# Patient Record
Sex: FEMALE | Race: WHITE | Employment: OTHER | URBAN - METROPOLITAN AREA
[De-identification: names, ages, dates, MRNs, and addresses within clinical notes are randomized per-mention and may not be internally consistent; named-entity substitution may affect disease eponyms.]

---

## 2017-01-12 ENCOUNTER — OFFICE VISIT (OUTPATIENT)
Dept: ONCOLOGY | Age: 67
End: 2017-01-12

## 2017-01-12 VITALS
OXYGEN SATURATION: 96 % | RESPIRATION RATE: 20 BRPM | BODY MASS INDEX: 38.62 KG/M2 | SYSTOLIC BLOOD PRESSURE: 143 MMHG | WEIGHT: 226.2 LBS | DIASTOLIC BLOOD PRESSURE: 68 MMHG | HEIGHT: 64 IN | TEMPERATURE: 97.2 F | HEART RATE: 80 BPM

## 2017-01-12 DIAGNOSIS — D50.0 IRON DEFICIENCY ANEMIA DUE TO CHRONIC BLOOD LOSS: Primary | ICD-10-CM

## 2017-01-12 RX ORDER — METOPROLOL SUCCINATE 25 MG/1
TABLET, EXTENDED RELEASE ORAL
COMMUNITY
Start: 2016-11-06 | End: 2017-05-03 | Stop reason: SDUPTHER

## 2017-01-12 NOTE — PROGRESS NOTES
[id number] [facility] at [facility]  [phone]    Hematology / Oncology Followup    Reason for Visit:   Oc Chapin is a 61 y.o. female who is seen for follow up of iron deficiency anemia. History of Present Illness:   She received IV iron last summer. Fatigue improved initially, but has subsequently. Stress could be contributing, mother  a few months ago. No bleeding. No melena. PAST HISTORY: The following sections were reviewed and updated in the EMR as appropriate: PMH, SH, FH, Medications, Allergies. She is unaccompanied today. She works part time at an office. She lives in [place] with her , grown son, and her grandson is with them part-time. Allergies   Allergen Reactions    Loratadine Nausea Only and Other (comments)     Muscle weakness      Review of Systems: A complete review of systems was obtained, reviewed, and scanned into the EMR. Pertinent findings reviewed above. Physical Exam:     Visit Vitals    /68 (BP 1 Location: Left arm, BP Patient Position: Sitting)    Pulse 80    Temp 97.2 °F (36.2 °C) (Oral)    Resp 20    Ht 5' 4\" (1.626 m)    Wt 226 lb 3.2 oz (102.6 kg)    LMP 1996    SpO2 96%    BMI 38.83 kg/m2     General: No distress  Eyes: PERRLA, anicteric sclerae  HENT: Atraumatic, OP clear  Neck: Supple  Lymphatic: No cervical, supraclavicular, or inguinal adenopathy  Respiratory: CTAB, normal respiratory effort  CV: Normal rate, regular rhythm, no murmurs, no peripheral edema  GI: Soft, nontender, nondistended, no masses, no hepatomegaly, no splenomegaly  MS: Normal gait and station. Digits without clubbing or cyanosis. Skin: No rashes, ecchymoses, or petechiae. Normal temperature, turgor, and texture.   Psych: Alert, oriented, appropriate affect, normal judgment/insight    Results:     Lab Results   Component Value Date/Time    WBC 9.2 2017 11:58 AM    HGB 15.1 2017 11:58 AM    HCT 44.7 01/05/2017 11:58 AM    PLATELET 381 62/24/4768 11:58 AM    MCV 86 01/05/2017 11:58 AM    ABS. NEUTROPHILS 6.1 07/26/2016 12:36 PM    Hemoglobin (POC) 8.8 02/20/2014 11:34 AM    Hemoglobin (POC) 10.2 10/15/2011 03:12 PM    Hematocrit (POC) 30 10/15/2011 03:12 PM     Lab Results   Component Value Date/Time    Sodium 140 07/27/2016 01:03 AM    Potassium 3.8 07/27/2016 01:03 AM    Chloride 106 07/27/2016 01:03 AM    CO2 28 07/27/2016 01:03 AM    Glucose 116 07/27/2016 01:03 AM    BUN 7 07/27/2016 01:03 AM    Creatinine 0.80 07/27/2016 01:03 AM    GFR est AA >60 07/27/2016 01:03 AM    GFR est non-AA >60 07/27/2016 01:03 AM    Calcium 8.6 07/27/2016 01:03 AM    Sodium (POC) 141 10/15/2011 03:12 PM    Potassium (POC) 3.5 10/15/2011 03:12 PM    Chloride (POC) 104 10/15/2011 03:12 PM    Glucose (POC) 83 07/26/2016 12:03 PM    Glucose (POC) 95 10/15/2011 03:12 PM    Glucose POC 90 12/05/2016 11:08 AM    BUN (POC) 10 10/15/2011 03:12 PM    Creatinine (POC) 1.1 10/15/2011 03:12 PM    Calcium, ionized (POC) 1.11 10/15/2011 03:12 PM     Lab Results   Component Value Date/Time    Bilirubin, total 0.4 07/26/2016 12:36 PM    AST 29 07/26/2016 12:36 PM    Alk.  phosphatase 81 07/26/2016 12:36 PM    Protein, total 7.6 07/26/2016 12:36 PM    Albumin 3.9 07/26/2016 12:36 PM    Globulin 3.7 07/26/2016 12:36 PM    A-G Ratio 1.1 07/26/2016 12:36 PM       Lab Results   Component Value Date/Time    Reticulocyte count 1.9 02/20/2014 10:50 AM    Iron % saturation 21 01/05/2017 11:58 AM    Iron 78 01/05/2017 11:58 AM    TIBC 378 01/05/2017 11:58 AM    Ferritin 24 01/05/2017 11:58 AM    Vitamin B12 380 08/21/2012 09:45 AM    Folate 15.5 08/21/2012 09:45 AM    Haptoglobin 101 02/20/2014 10:50 AM     12/08/2011 02:38 PM    Sed rate (ESR) 5 05/05/2014 10:40 AM    TSH 2.59 07/26/2016 12:36 PM    TSH 1.960 03/17/2015 03:04 PM    INR 1.0 10/15/2011 03:07 PM    INR (POC) 1.0 07/26/2016 12:07 PM    Lipase 70 05/10/2012 12:30 PM     Ferritin   Date Value   01/05/2017 24 ng/mL   07/15/2016 18 ng/mL   05/03/2016 4 ng/mL (L)   12/07/2015 10 ng/mL (L)   06/03/2015 17 ng/mL   12/08/2014 79 ng/mL   06/11/2014 40 NG/ML   04/11/2014 19 NG/ML   02/20/2014 5 ng/mL (L)   10/07/2011 5 ng/mL (L)       Stool Blood 2/24/2014: negative  Celiac panel 12/8/2014: negative    EGD and Colonscopy with Dr. Anshul Stewart 4/9/2014: severe diverticulosis, nonbleeding  Capsule study 4/30/2014: normal    EGD and Colonoscopy with Dr. Tex Marte 6/16/2016: large cecum AVM, one polyp (tubular adenoma), otherwise normal.  Capsule endoscopy 6/30/2016: normal    Assessment:   1) Anemia, Iron Deficiency  Recurrent. HGB now normalized s/p feraheme (5/2016 and 7/2016). Iron levels remain low-normal, but overall stable. The patient has not been able to tolerate PO iron replacement. The source of her iron deficiency remains uncertain. Based on repeat GI evaluation, it may have been the large AVM in her cecum, which has now been treated. Celiac panel was negative, and no reason to suggest another malabsorption issue. No evidence of bleeding from elsewhere. Monitor. 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 If iron deficiency continues to recur, may need repeat endoscopy at some point. 3) Fatigue  Recurrent. Not clear that this is related to her iron deficiency anemia, as it persists now despite a HGB of 15. Follow up with PCP.     Plan:     Labs in 6 months: CBC, iron profile, ferritin ([facility])  Return to see me in about 6 months      Signed By: Alex Osorio MD     January 12, 2017

## 2017-01-12 NOTE — MR AVS SNAPSHOT
Visit Information Date & Time Provider Department Dept. Phone Encounter #  
 1/12/2017  9:30 AM Eduard Lagos MD [facility] at [address] [place] [address] Rd [id number] Follow-up Instructions Return in about 6 months (around 7/12/2017) for michael Azul deficiency fu. Follow-up and Disposition History Upcoming Health Maintenance Date Due Hepatitis C Screening 1950 Pneumococcal 19-64 Medium Risk (1 of 1 - PPSV23) 4/21/1971 DTaP/Tdap/Td series (1 - Tdap) 4/21/1973 PAP AKA CERVICAL CYTOLOGY 4/21/1973 ZOSTER VACCINE AGE 60> 4/21/2012 BREAST CANCER SCRN MAMMOGRAM 4/14/2016 INFLUENZA AGE 9 TO ADULT 8/1/2016 COLONOSCOPY 4/9/2024 Allergies as of 1/12/2017  Review Complete On: 1/12/2017 By: Eduard Lagos MD  
  
 Severity Noted Reaction Type Reactions Loratadine  12/08/2011    Nausea Only, Other (comments) Muscle weakness Current Immunizations  Reviewed on 7/29/2016 Name Date Hib (PRP-T) 6/30/2015 Influenza Vaccine 12/17/2015, 10/22/2015 Influenza Vaccine Split 10/26/2012, 10/7/2011 Not reviewed this visit You Were Diagnosed With   
  
 Codes Comments Iron deficiency anemia due to chronic blood loss    -  Primary ICD-10-CM: D50.0 ICD-9-CM: 280.0 Vitals BP Pulse Temp Resp Height(growth percentile) Weight(growth percentile) 143/68 (BP 1 Location: Left arm, BP Patient Position: Sitting) 80 97.2 °F (36.2 °C) (Oral) 20 5' 4\" (1.626 m) 226 lb 3.2 oz (102.6 kg) LMP SpO2 BMI OB Status Smoking Status 01/01/1996 96% 38.83 kg/m2 Hysterectomy Former Smoker BMI and BSA Data Body Mass Index Body Surface Area  
 38.83 kg/m 2 2.15 m 2 Preferred Pharmacy Pharmacy Name Phone [facility] [address] [place] [address], [address] [place] [address] [phone] Your Updated Medication List  
  
   
 This list is accurate as of: 1/12/17 10:10 AM.  Always use your most recent med list.  
  
  
  
  
 albuterol 90 mcg/actuation inhaler Commonly known as:  VENTOLIN HFA Take 2 Puffs by inhalation every six (6) hours as needed for Wheezing or Shortness of Breath (may also use for cough). ALPRAZolam 0.5 mg tablet Commonly known as:  Mosetta Harp Take 1 Tab by mouth two (2) times daily as needed for Anxiety. Max Daily Amount: 1 mg.  
  
 atorvastatin 10 mg tablet Commonly known as:  LIPITOR Take 1 Tab by mouth daily. hydrocortisone 2.5 % topical cream  
Commonly known as:  HYTONE Apply  to affected area two (2) times a day. use thin layer  
  
 lansoprazole 30 mg capsule Commonly known as:  PREVACID TAKE 1 CAPSULE DAILY BEFORE BREAKFAST  
  
 levocetirizine 5 mg tablet Commonly known as:  Dimple Delilah Take 1 Tab by mouth daily. losartan 25 mg tablet Commonly known as:  COZAAR Take 1 Tab by mouth daily. mometasone-formoterol 200-5 mcg/actuation HFA inhaler Commonly known as:  Monica Kristy Take 2 Puffs by inhalation two (2) times a day. montelukast 10 mg tablet Commonly known as:  SINGULAIR  
TAKE 1 TABLET DAILY PARoxetine 20 mg tablet Commonly known as:  PAXIL Take 1 Tab by mouth daily. potassium chloride 20 mEq tablet Commonly known as:  K-DUR, KLOR-CON Take 1 Tab by mouth three (3) times daily. sodium bicarb-sodium chloride Kit Commonly known as:  NEILMED SINUS RINSE COMPLETE  
1 Packet by Nasal route daily. TOPROL XL 25 mg XL tablet Generic drug:  metoprolol succinate  
  
 triamterene-hydroCHLOROthiazide 37.5-25 mg per tablet Commonly known as:  Ramonia Gelineau TAKE 1 TABLET DAILY We Performed the Following CBC W/O DIFF [94066 CPT(R)] FERRITIN [28800 CPT(R)] IRON PROFILE [id number] CPT(R)] Follow-up Instructions Return in about 6 months (around 7/12/2017) for Radjose eduardo, iron deficiency fu. Introducing [facility]! Karli Mc introduces [employer] patient portal. Now you can access parts of your medical record, email your doctor's office, and request medication refills online. 1. In your internet browser, go to [email or web address] 2. Click on the First Time User? Click Here link in the Sign In box. You will see the New Member Sign Up page. 3. Enter your [employer] Access Code exactly as it appears below. You will not need to use this code after youve completed the sign-up process. If you do not sign up before the expiration date, you must request a new code. · [employer] Access Code: [email or web address] Expires: 2/5/2017  2:42 PM 
 
4. Enter the last four digits of your Social Security Number (xxxx) and Date of Birth (mm/dd/yyyy) as indicated and click Submit. You will be taken to the next sign-up page. 5. Create a [employer] ID. This will be your [employer] login ID and cannot be changed, so think of one that is secure and easy to remember. 6. Create a [employer] password. You can change your password at any time. 7. Enter your Password Reset Question and Answer. This can be used at a later time if you forget your password. 8. Enter your e-mail address. You will receive e-mail notification when new information is available in [address] 9. Click Sign Up. You can now view and download portions of your medical record. 10. Click the Download Summary menu link to download a portable copy of your medical information. If you have questions, please visit the Frequently Asked Questions section of the [employer] website. Remember, [employer] is NOT to be used for urgent needs. For medical emergencies, dial 911. Now available from your iPhone and Android! Please provide this summary of care documentation to your next provider. Your primary care clinician is listed as Joni Swenson. If you have any questions after today's visit, please call [phone].

## 2017-01-17 RX ORDER — ATORVASTATIN CALCIUM 10 MG/1
TABLET, FILM COATED ORAL
Qty: 90 TAB | Refills: 0 | Status: SHIPPED | OUTPATIENT
Start: 2017-01-17 | End: 2017-03-13 | Stop reason: SDUPTHER

## 2017-02-12 DIAGNOSIS — I10 ESSENTIAL HYPERTENSION: ICD-10-CM

## 2017-02-12 RX ORDER — TRIAMTERENE/HYDROCHLOROTHIAZID 37.5-25 MG
TABLET ORAL
Qty: 90 TAB | Refills: 0 | Status: SHIPPED | OUTPATIENT
Start: 2017-02-12 | End: 2017-03-13 | Stop reason: SDUPTHER

## 2017-02-27 RX ORDER — POTASSIUM CHLORIDE 20 MEQ/1
TABLET, EXTENDED RELEASE ORAL
Qty: 90 TAB | Refills: 0 | Status: SHIPPED | OUTPATIENT
Start: 2017-02-27 | End: 2017-03-13 | Stop reason: SDUPTHER

## 2017-02-27 RX ORDER — LEVOCETIRIZINE DIHYDROCHLORIDE 5 MG/1
TABLET, FILM COATED ORAL
Qty: 90 TAB | Refills: 0 | Status: SHIPPED | OUTPATIENT
Start: 2017-02-27 | End: 2017-03-13 | Stop reason: SDUPTHER

## 2017-03-13 ENCOUNTER — OFFICE VISIT (OUTPATIENT)
Dept: FAMILY MEDICINE CLINIC | Age: 67
End: 2017-03-13

## 2017-03-13 VITALS
WEIGHT: 224 LBS | SYSTOLIC BLOOD PRESSURE: 115 MMHG | RESPIRATION RATE: 20 BRPM | HEART RATE: 78 BPM | DIASTOLIC BLOOD PRESSURE: 77 MMHG | TEMPERATURE: 98.2 F | OXYGEN SATURATION: 96 % | HEIGHT: 64 IN | BODY MASS INDEX: 38.24 KG/M2

## 2017-03-13 DIAGNOSIS — E66.9 OBESITY (BMI 30-39.9): ICD-10-CM

## 2017-03-13 DIAGNOSIS — J30.9 ALLERGIC RHINITIS, UNSPECIFIED ALLERGIC RHINITIS TRIGGER, UNSPECIFIED RHINITIS SEASONALITY: ICD-10-CM

## 2017-03-13 DIAGNOSIS — E78.00 HYPERCHOLESTEROLEMIA: ICD-10-CM

## 2017-03-13 DIAGNOSIS — I10 ESSENTIAL HYPERTENSION WITH GOAL BLOOD PRESSURE LESS THAN 130/80: Primary | ICD-10-CM

## 2017-03-13 DIAGNOSIS — F41.9 ANXIETY: ICD-10-CM

## 2017-03-13 DIAGNOSIS — K21.9 GASTROESOPHAGEAL REFLUX DISEASE WITHOUT ESOPHAGITIS: ICD-10-CM

## 2017-03-13 DIAGNOSIS — D50.0 IRON DEFICIENCY ANEMIA DUE TO CHRONIC BLOOD LOSS: ICD-10-CM

## 2017-03-13 RX ORDER — TRIAMTERENE/HYDROCHLOROTHIAZID 37.5-25 MG
1 TABLET ORAL DAILY
Qty: 90 TAB | Refills: 1 | Status: SHIPPED | OUTPATIENT
Start: 2017-03-13 | End: 2017-10-17 | Stop reason: SDUPTHER

## 2017-03-13 RX ORDER — LOSARTAN POTASSIUM 25 MG/1
25 TABLET ORAL DAILY
Qty: 30 TAB | Refills: 0 | Status: SHIPPED | OUTPATIENT
Start: 2017-03-13 | End: 2017-03-14 | Stop reason: SDUPTHER

## 2017-03-13 RX ORDER — LEVOCETIRIZINE DIHYDROCHLORIDE 5 MG/1
5 TABLET, FILM COATED ORAL DAILY
Qty: 90 TAB | Refills: 1 | Status: SHIPPED | OUTPATIENT
Start: 2017-03-13 | End: 2017-10-17 | Stop reason: SDUPTHER

## 2017-03-13 RX ORDER — PAROXETINE HYDROCHLORIDE 20 MG/1
20 TABLET, FILM COATED ORAL DAILY
Qty: 30 TAB | Refills: 0 | Status: SHIPPED | OUTPATIENT
Start: 2017-03-13 | End: 2017-04-17 | Stop reason: SDUPTHER

## 2017-03-13 RX ORDER — POTASSIUM CHLORIDE 20 MEQ/1
20 TABLET, EXTENDED RELEASE ORAL DAILY
Qty: 360 TAB | Refills: 1 | Status: SHIPPED | OUTPATIENT
Start: 2017-03-13 | End: 2017-03-27 | Stop reason: SDUPTHER

## 2017-03-13 RX ORDER — ATORVASTATIN CALCIUM 10 MG/1
10 TABLET, FILM COATED ORAL DAILY
Qty: 90 TAB | Refills: 1 | Status: SHIPPED | OUTPATIENT
Start: 2017-03-13 | End: 2017-09-21 | Stop reason: SDUPTHER

## 2017-03-13 NOTE — PROGRESS NOTES
Chief Complaint   Patient presents with    Medication Refill     1. Have you been to the ER, urgent care clinic since your last visit? Hospitalized since your last visit? No    2. Have you seen or consulted any other health care providers outside of the [facility] since your last visit? Include any pap smears or colon screening.  No

## 2017-03-13 NOTE — PATIENT INSTRUCTIONS
DASH Diet: Care Instructions  Your Care Instructions  The DASH diet is an eating plan that can help lower your blood pressure. DASH stands for Dietary Approaches to Stop Hypertension. Hypertension is high blood pressure. The DASH diet focuses on eating foods that are high in calcium, potassium, and magnesium. These nutrients can lower blood pressure. The foods that are highest in these nutrients are fruits, vegetables, low-fat dairy products, nuts, seeds, and legumes. But taking calcium, potassium, and magnesium supplements instead of eating foods that are high in those nutrients does not have the same effect. The DASH diet also includes whole grains, fish, and poultry. The DASH diet is one of several lifestyle changes your doctor may recommend to lower your high blood pressure. Your doctor may also want you to decrease the amount of sodium in your diet. Lowering sodium while following the DASH diet can lower blood pressure even further than just the DASH diet alone. Follow-up care is a key part of your treatment and safety. Be sure to make and go to all appointments, and call your doctor if you are having problems. It's also a good idea to know your test results and keep a list of the medicines you take. How can you care for yourself at home? Following the DASH diet  · Eat 4 to 5 servings of fruit each day. A serving is 1 medium-sized piece of fruit, ½ cup chopped or canned fruit, 1/4 cup dried fruit, or 4 ounces (½ cup) of fruit juice. Choose fruit more often than fruit juice. · Eat 4 to 5 servings of vegetables each day. A serving is 1 cup of lettuce or raw leafy vegetables, ½ cup of chopped or cooked vegetables, or 4 ounces (½ cup) of vegetable juice. Choose vegetables more often than vegetable juice. · Get 2 to 3 servings of low-fat and fat-free dairy each day. A serving is 8 ounces of milk, 1 cup of yogurt, or 1 ½ ounces of cheese. · Eat 6 to 8 servings of grains each day.  A serving is 1 slice of bread, 1 ounce of dry cereal, or ½ cup of cooked rice, pasta, or cooked cereal. Try to choose whole-grain products as much as possible. · Limit lean meat, poultry, and fish to 2 servings each day. A serving is 3 ounces, about the size of a deck of cards. · Eat 4 to 5 servings of nuts, seeds, and legumes (cooked dried beans, lentils, and split peas) each week. A serving is 1/3 cup of nuts, 2 tablespoons of seeds, or ½ cup of cooked beans or peas. · Limit fats and oils to 2 to 3 servings each day. A serving is 1 teaspoon of vegetable oil or 2 tablespoons of salad dressing. · Limit sweets and added sugars to 5 servings or less a week. A serving is 1 tablespoon jelly or jam, ½ cup sorbet, or 1 cup of lemonade. · Eat less than 2,300 milligrams (mg) of sodium a day. If you limit your sodium to 1,500 mg a day, you can lower your blood pressure even more. Tips for success  · Start small. Do not try to make dramatic changes to your diet all at once. You might feel that you are missing out on your favorite foods and then be more likely to not follow the plan. Make small changes, and stick with them. Once those changes become habit, add a few more changes. · Try some of the following:  ¨ Make it a goal to eat a fruit or vegetable at every meal and at snacks. This will make it easy to get the recommended amount of fruits and vegetables each day. ¨ Try yogurt topped with fruit and nuts for a snack or healthy dessert. ¨ Add lettuce, tomato, cucumber, and onion to sandwiches. ¨ Combine a ready-made pizza crust with low-fat mozzarella cheese and lots of vegetable toppings. Try using tomatoes, squash, spinach, broccoli, carrots, cauliflower, and onions. ¨ Have a variety of cut-up vegetables with a low-fat dip as an appetizer instead of chips and dip. ¨ Sprinkle sunflower seeds or chopped almonds over salads. Or try adding chopped walnuts or almonds to cooked vegetables. ¨ Try some vegetarian meals using beans and peas. Add garbanzo or kidney beans to salads. Make burritos and tacos with mashed morse beans or black beans. Where can you learn more? Go to http://negrita-zuleyka.info/. Enter Q216 in the search box to learn more about \"DASH Diet: Care Instructions. \"  Current as of: March 23, 2016  Content Version: 11.1  © 5977-2529 [employer]. Care instructions adapted under license by [employer] (which disclaims liability or warranty for this information). If you have questions about a medical condition or this instruction, always ask your healthcare professional. [place] [address] any warranty or liability for your use of this information. Gastroesophageal Reflux Disease (GERD): Care Instructions  Your Care Instructions    Gastroesophageal reflux disease (GERD) is the backward flow of stomach acid into the esophagus. The esophagus is the tube that leads from your throat to your stomach. A one-way valve prevents the stomach acid from moving up into this tube. When you have GERD, this valve does not close tightly enough. If you have mild GERD symptoms including heartburn, you may be able to control the problem with antacids or over-the-counter medicine. Changing your diet, losing weight, and making other lifestyle changes can also help reduce symptoms. Follow-up care is a key part of your treatment and safety. Be sure to make and go to all appointments, and call your doctor if you are having problems. Its also a good idea to know your test results and keep a list of the medicines you take. How can you care for yourself at home? · Take your medicines exactly as prescribed. Call your doctor if you think you are having a problem with your medicine. · Your doctor may recommend over-the-counter medicine. For mild or occasional indigestion, antacids, such as Tums, Gaviscon, Mylanta, or Maalox, may help.  Your doctor also may recommend over-the-counter acid reducers, such as Pepcid AC, Tagamet HB, Zantac 75, or Prilosec. Read and follow all instructions on the label. If you use these medicines often, talk with your doctor. · Change your eating habits. ¨ Its best to eat several small meals instead of two or three large meals. ¨ After you eat, wait 2 to 3 hours before you lie down. ¨ Chocolate, mint, and alcohol can make GERD worse. ¨ Spicy foods, foods that have a lot of acid (like tomatoes and oranges), and coffee can make GERD symptoms worse in some people. If your symptoms are worse after you eat a certain food, you may want to stop eating that food to see if your symptoms get better. · Do not smoke or chew tobacco. Smoking can make GERD worse. If you need help quitting, talk to your doctor about stop-smoking programs and medicines. These can increase your chances of quitting for good. · If you have GERD symptoms at night, raise the head of your bed 6 to 8 inches by putting the frame on blocks or placing a foam wedge under the head of your mattress. (Adding extra pillows does not work.)  · Do not wear tight clothing around your middle. · Lose weight if you need to. Losing just 5 to 10 pounds can help. When should you call for help? Call your doctor now or seek immediate medical care if:  · You have new or different belly pain. · Your stools are black and tarlike or have streaks of blood. Watch closely for changes in your health, and be sure to contact your doctor if:  · Your symptoms have not improved after 2 days. · Food seems to catch in your throat or chest.  Where can you learn more? Go to http://negrita-zuleyka.info/. Enter Y662 in the search box to learn more about \"Gastroesophageal Reflux Disease (GERD): Care Instructions. \"  Current as of: August 9, 2016  Content Version: 11.1  © 3884-0734 [employer].  Care instructions adapted under license by [employer] (which disclaims liability or warranty for this information). If you have questions about a medical condition or this instruction, always ask your healthcare professional. [place] [address] any warranty or liability for your use of this information.

## 2017-03-13 NOTE — MR AVS SNAPSHOT
Visit Information Date & Time Provider Department Dept. Phone Encounter #  
 3/13/2017 10:15 AM Liza Choi NP [facility] [id number] Follow-up Instructions Return in about 4 months (around 7/13/2017) for welcome to medicare visit, fasting blood work. Your Appointments 7/21/2017  9:30 AM  
ESTABLISHED PATIENT with Rajan Jiménez MD  
[facility]) Appt Note: [place], iron deficiency fu  
 [address], [address] [place] [address]  
[phone]  
  
   
 [address] [facility], [address] [facility] Upcoming Health Maintenance Date Due Hepatitis C Screening 1950 Pneumococcal 19-64 Medium Risk (1 of 1 - PPSV23) 4/21/1971 DTaP/Tdap/Td series (1 - Tdap) 4/21/1973 PAP AKA CERVICAL CYTOLOGY 4/21/1973 ZOSTER VACCINE AGE 60> 4/21/2012 BREAST CANCER SCRN MAMMOGRAM 4/14/2016 INFLUENZA AGE 9 TO ADULT 8/1/2016 COLONOSCOPY 4/9/2024 Allergies as of 3/13/2017  Review Complete On: 3/13/2017 By: Liza Choi NP Severity Noted Reaction Type Reactions Loratadine  12/08/2011    Nausea Only, Other (comments) Muscle weakness Current Immunizations  Reviewed on 7/29/2016 Name Date Hib (PRP-T) 6/30/2015 Influenza Vaccine 12/17/2015, 10/22/2015 Influenza Vaccine Split 10/26/2012, 10/7/2011 Not reviewed this visit You Were Diagnosed With   
  
 Codes Comments Essential hypertension with goal blood pressure less than 130/80    -  Primary ICD-10-CM: I10 
ICD-9-CM: 401.9 Gastroesophageal reflux disease without esophagitis     ICD-10-CM: K21.9 ICD-9-CM: 530.81 Anxiety     ICD-10-CM: F41.9 ICD-9-CM: 300.00 Hypercholesterolemia     ICD-10-CM: E78.00 ICD-9-CM: 272.0 Iron deficiency anemia due to chronic blood loss     ICD-10-CM: D50.0 ICD-9-CM: 280.0 Allergic rhinitis, unspecified allergic rhinitis trigger, unspecified rhinitis seasonality     ICD-10-CM: J30.9 ICD-9-CM: 477.9 Vitals BP Pulse Temp Resp Height(growth percentile) Weight(growth percentile) 115/77 (BP 1 Location: Left arm, BP Patient Position: Sitting) 78 98.2 °F (36.8 °C) (Oral) 20 5' 4\" (1.626 m) 224 lb (101.6 kg) LMP SpO2 BMI OB Status Smoking Status 01/01/1996 96% 38.45 kg/m2 Hysterectomy Former Smoker Vitals History BMI and BSA Data Body Mass Index Body Surface Area  
 38.45 kg/m 2 2.14 m 2 Preferred Pharmacy Pharmacy Name Phone [facility] [address] [place] [address], [address] [place] [address] [phone] Your Updated Medication List  
  
   
This list is accurate as of: 3/13/17 11:06 AM.  Always use your most recent med list.  
  
  
  
  
 albuterol 90 mcg/actuation inhaler Commonly known as:  VENTOLIN HFA Take 2 Puffs by inhalation every six (6) hours as needed for Wheezing or Shortness of Breath (may also use for cough). ALPRAZolam 0.5 mg tablet Commonly known as:  Volanda Pronto Take 1 Tab by mouth two (2) times daily as needed for Anxiety. Max Daily Amount: 1 mg.  
  
 atorvastatin 10 mg tablet Commonly known as:  LIPITOR Take 1 Tab by mouth daily. hydrocortisone 2.5 % topical cream  
Commonly known as:  HYTONE Apply  to affected area two (2) times a day. use thin layer  
  
 lansoprazole 30 mg capsule Commonly known as:  PREVACID TAKE 1 CAPSULE DAILY BEFORE BREAKFAST  
  
 levocetirizine 5 mg tablet Commonly known as:  Carly Boyers Take 1 Tab by mouth daily. losartan 25 mg tablet Commonly known as:  COZAAR Take 1 Tab by mouth daily. mometasone-formoterol 200-5 mcg/actuation HFA inhaler Commonly known as:  Marivel Batter Take 2 Puffs by inhalation two (2) times a day. montelukast 10 mg tablet Commonly known as:  SINGULAIR  
TAKE 1 TABLET DAILY PARoxetine 20 mg tablet Commonly known as:  PAXIL Take 1 Tab by mouth daily. potassium chloride 20 mEq tablet Commonly known as:  K-DUR, KLOR-CON Take 1 Tab by mouth daily. sodium bicarb-sodium chloride Kit Commonly known as:  NEILMED SINUS RINSE COMPLETE  
1 Packet by Nasal route daily. TOPROL XL 25 mg XL tablet Generic drug:  metoprolol succinate  
  
 triamterene-hydroCHLOROthiazide 37.5-25 mg per tablet Commonly known as:  Evlia Divine Take 1 Tab by mouth daily. Prescriptions Sent to Pharmacy Refills  
 losartan (COZAAR) 25 mg tablet 0 Sig: Take 1 Tab by mouth daily. Class: Normal  
 Pharmacy: [facility] [address] [place], [place] [address]y [address] [place] [address] E Ph #: [phone] Route: Oral  
 atorvastatin (LIPITOR) 10 mg tablet 1 Sig: Take 1 Tab by mouth daily. Class: Normal  
 Pharmacy: [address], [address] Ph #: [phone] Route: Oral  
 triamterene-hydroCHLOROthiazide (MAXZIDE) 37.5-25 mg per tablet 1 Sig: Take 1 Tab by mouth daily. Class: Normal  
 Pharmacy: [address], [address] Ph #: [phone] Route: Oral  
 levocetirizine (XYZAL) 5 mg tablet 1 Sig: Take 1 Tab by mouth daily. Class: Normal  
 Pharmacy: [address], [address] Ph #: [phone] Route: Oral  
 potassium chloride (K-DUR, KLOR-CON) 20 mEq tablet 1 Sig: Take 1 Tab by mouth daily. Class: Normal  
 Pharmacy: [address], [address] Ph #: [phone] Route: Oral  
 PARoxetine (PAXIL) 20 mg tablet 0 Sig: Take 1 Tab by mouth daily. Class: Normal  
 Pharmacy: [facility] [address] [place], [place] [address]y [address] [place] [address] E Ph #: [phone] Route: Oral  
  
Follow-up Instructions Return in about 4 months (around 7/13/2017) for welcome to medicare visit, fasting blood work. Patient Instructions DASH Diet: Care Instructions Your Care Instructions The DASH diet is an eating plan that can help lower your blood pressure. DASH stands for Dietary Approaches to Stop Hypertension. Hypertension is high blood pressure. The DASH diet focuses on eating foods that are high in calcium, potassium, and magnesium. These nutrients can lower blood pressure. The foods that are highest in these nutrients are fruits, vegetables, low-fat dairy products, nuts, seeds, and legumes. But taking calcium, potassium, and magnesium supplements instead of eating foods that are high in those nutrients does not have the same effect. The DASH diet also includes whole grains, fish, and poultry. The DASH diet is one of several lifestyle changes your doctor may recommend to lower your high blood pressure. Your doctor may also want you to decrease the amount of sodium in your diet. Lowering sodium while following the DASH diet can lower blood pressure even further than just the DASH diet alone. Follow-up care is a key part of your treatment and safety. Be sure to make and go to all appointments, and call your doctor if you are having problems. It's also a good idea to know your test results and keep a list of the medicines you take. How can you care for yourself at home? Following the DASH diet · Eat 4 to 5 servings of fruit each day. A serving is 1 medium-sized piece of fruit, ½ cup chopped or canned fruit, 1/4 cup dried fruit, or 4 ounces (½ cup) of fruit juice. Choose fruit more often than fruit juice. · Eat 4 to 5 servings of vegetables each day. A serving is 1 cup of lettuce or raw leafy vegetables, ½ cup of chopped or cooked vegetables, or 4 ounces (½ cup) of vegetable juice. Choose vegetables more often than vegetable juice. · Get 2 to 3 servings of low-fat and fat-free dairy each day.  A serving is 8 ounces of milk, 1 cup of yogurt, or 1 ½ ounces of cheese. · Eat 6 to 8 servings of grains each day. A serving is 1 slice of bread, 1 ounce of dry cereal, or ½ cup of cooked rice, pasta, or cooked cereal. Try to choose whole-grain products as much as possible. · Limit lean meat, poultry, and fish to 2 servings each day. A serving is 3 ounces, about the size of a deck of cards. · Eat 4 to 5 servings of nuts, seeds, and legumes (cooked dried beans, lentils, and split peas) each week. A serving is 1/3 cup of nuts, 2 tablespoons of seeds, or ½ cup of cooked beans or peas. · Limit fats and oils to 2 to 3 servings each day. A serving is 1 teaspoon of vegetable oil or 2 tablespoons of salad dressing. · Limit sweets and added sugars to 5 servings or less a week. A serving is 1 tablespoon jelly or jam, ½ cup sorbet, or 1 cup of lemonade. · Eat less than 2,300 milligrams (mg) of sodium a day. If you limit your sodium to 1,500 mg a day, you can lower your blood pressure even more. Tips for success · Start small. Do not try to make dramatic changes to your diet all at once. You might feel that you are missing out on your favorite foods and then be more likely to not follow the plan. Make small changes, and stick with them. Once those changes become habit, add a few more changes. · Try some of the following: ¨ Make it a goal to eat a fruit or vegetable at every meal and at snacks. This will make it easy to get the recommended amount of fruits and vegetables each day. ¨ Try yogurt topped with fruit and nuts for a snack or healthy dessert. ¨ Add lettuce, tomato, cucumber, and onion to sandwiches. ¨ Combine a ready-made pizza crust with low-fat mozzarella cheese and lots of vegetable toppings. Try using tomatoes, squash, spinach, broccoli, carrots, cauliflower, and onions. ¨ Have a variety of cut-up vegetables with a low-fat dip as an appetizer instead of chips and dip. ¨ Sprinkle sunflower seeds or chopped almonds over salads. Or try adding chopped walnuts or almonds to cooked vegetables. ¨ Try some vegetarian meals using beans and peas. Add garbanzo or kidney beans to salads. Make burritos and tacos with mashed morse beans or black beans. Where can you learn more? Go to http://negrita-zuleyka.info/. Enter T299 in the search box to learn more about \"DASH Diet: Care Instructions. \" Current as of: March 23, 2016 Content Version: 11.1 © 4507-5492 [employer]Out. Care instructions adapted under license by [employer] (which disclaims liability or warranty for this information). If you have questions about a medical condition or this instruction, always ask your healthcare professional. [address] any warranty or liability for your use of this information. Gastroesophageal Reflux Disease (GERD): Care Instructions Your Care Instructions Gastroesophageal reflux disease (GERD) is the backward flow of stomach acid into the esophagus. The esophagus is the tube that leads from your throat to your stomach. A one-way valve prevents the stomach acid from moving up into this tube. When you have GERD, this valve does not close tightly enough. If you have mild GERD symptoms including heartburn, you may be able to control the problem with antacids or over-the-counter medicine. Changing your diet, losing weight, and making other lifestyle changes can also help reduce symptoms. Follow-up care is a key part of your treatment and safety. Be sure to make and go to all appointments, and call your doctor if you are having problems. Its also a good idea to know your test results and keep a list of the medicines you take. How can you care for yourself at home? · Take your medicines exactly as prescribed. Call your doctor if you think you are having a problem with your medicine. · Your doctor may recommend over-the-counter medicine. For mild or occasional indigestion, antacids, such as Tums, Gaviscon, Mylanta, or Maalox, may help. Your doctor also may recommend over-the-counter acid reducers, such as Pepcid AC, Tagamet HB, Zantac 75, or Prilosec. Read and follow all instructions on the label. If you use these medicines often, talk with your doctor. · Change your eating habits. ¨ Its best to eat several small meals instead of two or three large meals. ¨ After you eat, wait 2 to 3 hours before you lie down. ¨ Chocolate, mint, and alcohol can make GERD worse. ¨ Spicy foods, foods that have a lot of acid (like tomatoes and oranges), and coffee can make GERD symptoms worse in some people. If your symptoms are worse after you eat a certain food, you may want to stop eating that food to see if your symptoms get better. · Do not smoke or chew tobacco. Smoking can make GERD worse. If you need help quitting, talk to your doctor about stop-smoking programs and medicines. These can increase your chances of quitting for good. · If you have GERD symptoms at night, raise the head of your bed 6 to 8 inches by putting the frame on blocks or placing a foam wedge under the head of your mattress. (Adding extra pillows does not work.) · Do not wear tight clothing around your middle. · Lose weight if you need to. Losing just 5 to 10 pounds can help. When should you call for help? Call your doctor now or seek immediate medical care if: 
· You have new or different belly pain. · Your stools are black and tarlike or have streaks of blood. Watch closely for changes in your health, and be sure to contact your doctor if: 
· Your symptoms have not improved after 2 days. · Food seems to catch in your throat or chest. 
Where can you learn more? Go to http://negrita-zuleyka.info/.  
Enter J829 in the search box to learn more about \"Gastroesophageal Reflux Disease (GERD): Care Instructions. \" Current as of: August 9, 2016 Content Version: 11.1 © 6795-6204 [employer]. Care instructions adapted under license by [employer]montoring.com (which disclaims liability or warranty for this information). If you have questions about a medical condition or this instruction, always ask your healthcare professional. [address] any warranty or liability for your use of this information. Introducing [facility]! [facility] introduces FlyReadyJet patient portal. Now you can access parts of your medical record, email your doctor's office, and request medication refills online. 1. In your internet browser, go to [email or web address] 2. Click on the First Time User? Click Here link in the Sign In box. You will see the New Member Sign Up page. 3. Enter your FlyReadyJet Access Code exactly as it appears below. You will not need to use this code after youve completed the sign-up process. If you do not sign up before the expiration date, you must request a new code. · FlyReadyJet Access Code: [email or web address] Expires: 6/11/2017 10:57 AM 
 
4. Enter the last four digits of your Social Security Number (xxxx) and Date of Birth (mm/dd/yyyy) as indicated and click Submit. You will be taken to the next sign-up page. 5. Create a FlyReadyJet ID. This will be your FlyReadyJet login ID and cannot be changed, so think of one that is secure and easy to remember. 6. Create a FlyReadyJet password. You can change your password at any time. 7. Enter your Password Reset Question and Answer. This can be used at a later time if you forget your password. 8. Enter your e-mail address. You will receive e-mail notification when new information is available in [address] 9. Click Sign Up. You can now view and download portions of your medical record. 10. Click the Download Summary menu link to download a portable copy of your medical information. If you have questions, please visit the Frequently Asked Questions section of the [employer] website. Remember, [employer]Works is NOT to be used for urgent needs. For medical emergencies, dial 911. Now available from your iPhone and Android! Please provide this summary of care documentation to your next provider. Your primary care clinician is listed as Tigre Martinez. If you have any questions after today's visit, please call [phone].

## 2017-03-14 RX ORDER — LOSARTAN POTASSIUM 25 MG/1
25 TABLET ORAL DAILY
Qty: 90 TAB | Refills: 1 | Status: SHIPPED | OUTPATIENT
Start: 2017-03-14 | End: 2017-08-25 | Stop reason: SDUPTHER

## 2017-03-15 NOTE — PROGRESS NOTES
Subjective:     Julien Galloway is a 61 y.o. female who presents for follow up of hypertension, hyperlipidemia, anxiety, GERD, and obesity. Diet and Lifestyle: generally follows a low fat low cholesterol diet, generally follows a low sodium diet, exercises sporadically, nonsmoker  Home BP Monitoring: is not measured at home    Cardiovascular ROS: taking medications as instructed, no medication side effects noted, no TIA's, no chest pain on exertion, no dyspnea on exertion, no swelling of ankles, no orthostatic dizziness or lightheadedness. New concerns: reports  has expressed concern about long-term use of PPI after seeing commercials on TV indicating there may be some associated adverse effects. Each time she has tried to discontinue or even reduce her dose, she has recurrence of significant symptoms. She has been compliant with recommended lifestyle changes including elevating HOB, eating smaller meals, not eating late in the evening, not wearing tight clothing, avoidance of triggers. Doing well on paxil, wants to continue on current dose. Patient Active Problem List   Diagnosis Code    GERD (gastroesophageal reflux disease) K21.9    Obesity (BMI 30-39. 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 9)     Vertigo     Vestibulopathy 7/27/2016     Past Surgical History:   Procedure Laterality Date    HX COLONOSCOPY  10/25/2011 1 polyp which was removed, diverticulosis in distal descending colon & sigmoid colon.  HX ENDOSCOPY  04/2014    HX GYN  1996    hysterectomy    HX OTHER SURGICAL  2006? L Lower Abd. Basal Cell CA     Family History   Problem Relation Age of Onset    Hypertension Mother     Stroke Maternal Grandmother     Cancer Paternal Grandmother      breast cancer     Social History   Substance Use Topics    Smoking status: Former Smoker     Packs/day: 1.00     Years: 18.00     Types: Cigarettes     Quit date: 7/24/1988    Smokeless tobacco: Never Used      Comment: Quit in 1988    Alcohol use Yes      Comment: rarely        Lab Results  Component Value Date/Time   WBC 9.2 01/05/2017 11:58 AM   WBC 7.7 05/11/2012 03:22 PM   Hemoglobin (POC) 8.8 02/20/2014 11:34 AM   Hemoglobin (POC) 10.2 10/15/2011 03:12 PM   HGB 15.1 01/05/2017 11:58 AM   Hematocrit (POC) 30 10/15/2011 03:12 PM   HCT 44.7 01/05/2017 11:58 AM   PLATELET 398 55/26/5166 11:58 AM   MCV 86 01/05/2017 11:58 AM       Lab Results  Component Value Date/Time   Cholesterol, total 180 07/26/2016 12:36 PM   HDL Cholesterol 32 07/26/2016 12:36 PM   LDL, calculated 101.2 07/26/2016 12:36 PM   Triglyceride 234 07/26/2016 12:36 PM   CHOL/HDL Ratio 5.6 07/26/2016 12:36 PM       Lab Results  Component Value Date/Time   ALT (SGPT) 37 07/26/2016 12:36 PM   AST (SGOT) 29 07/26/2016 12:36 PM   Alk.  phosphatase 81 07/26/2016 12:36 PM   Bilirubin, direct 0.1 10/15/2011 03:07 PM   Bilirubin, total 0.4 07/26/2016 12:36 PM       Lab Results  Component Value Date/Time   GFR est AA >60 07/27/2016 01:03 AM   GFR est non-AA >60 07/27/2016 01:03 AM   Creatinine (POC) 1.1 10/15/2011 03:12 PM   Creatinine 0.80 07/27/2016 01:03 AM   BUN 7 07/27/2016 01:03 AM   BUN (POC) 10 10/15/2011 03:12 PM   Sodium (POC) 141 10/15/2011 03:12 PM   Sodium 140 07/27/2016 01:03 AM   Potassium 3.8 07/27/2016 01:03 AM   Potassium (POC) 3.5 10/15/2011 03:12 PM   Chloride (POC) 104 10/15/2011 03:12 PM Chloride 106 07/27/2016 01:03 AM   CO2 28 07/27/2016 01:03 AM      Lab Results  Component Value Date/Time   TSH 2.59 07/26/2016 12:36 PM   T4, Free 0.98 04/09/2013 04:10 PM         Review of Systems, additional:  Pertinent items are noted in HPI. Objective:     Visit Vitals    /77 (BP 1 Location: Left arm, BP Patient Position: Sitting)    Pulse 78    Temp 98.2 °F (36.8 °C) (Oral)    Resp 20    Ht 5' 4\" (1.626 m)    Wt 224 lb (101.6 kg)    LMP 01/01/1996    SpO2 96%    BMI 38.45 kg/m2     Appearance: alert, well appearing, and in no distress, oriented to person, place, and time, overweight and well hydrated. General exam: CVS exam BP noted to be well controlled today in office, S1, S2 normal, no gallop, no murmur, chest clear, no JVD, no HSM, no edema, peripheral vascular exam both carotids normal upstroke without bruits, neurological exam alert, oriented, normal speech, no focal findings or movement disorder noted, motor and sensory grossly normal bilaterally. Assessment/Plan:     .  current treatment plan is effective, no change in therapy  lab results and schedule of future lab studies reviewed with patient  repeat labs ordered prior to next appointment  reviewed diet, exercise and weight control. Ni was seen today for medication refill. Diagnoses and all orders for this visit:    Essential hypertension with goal blood pressure less than 130/80  At goal  Continue current medications  DASH diet  Walking 20 minutes daily  Weight loss  -     losartan (COZAAR) 25 mg tablet; Take 1 Tab by mouth daily. -     triamterene-hydroCHLOROthiazide (MAXZIDE) 37.5-25 mg per tablet; Take 1 Tab by mouth daily.     Gastroesophageal reflux disease without esophagitis  Discussed risks/benefits of long term use of PPI  Recommend continuing lansoprazole 30 mg for now  Weight loss  Avoid peppermint, caffeine, alcohol  Avoid triggers     Anxiety  Good clinical response on paroxetine  Continue at current dose  -     PARoxetine (PAXIL) 20 mg tablet; Take 1 Tab by mouth daily. Hypercholesterolemia  TLC Diet:  -- Saturated fat <7% of calories, cholesterol <200 mg/day  -- Consider increased viscous (soluble) fiber (10-25 g/day) and plant stanols/sterols  (2g/day) as therapeutic options to enhance LDL lowering  Weight management  Increased physical activity. Continue lipitor  Check fasting lipids prior to next appt- orders entered    Iron deficiency anemia due to chronic blood loss  Possible source of GI blood loss found and treated at last colonoscopy  S/p iron infusions  Continue f/u with hematology- next appt scheduled with Dr. Sarah Oropeza for July (4 months)    Allergic rhinitis, unspecified allergic rhinitis trigger, unspecified rhinitis seasonality  Continue xyzal flonase    Obesity,  Body mass index is 38.45 kg/(m^2). I have reviewed/discussed the above normal BMI with the patient. I have recommended the following interventions: encourage exercise, lifestyle education regarding diet, strength training and weight loss from baseline weight . The plan is as follows: I have counseled this patient on diet and exercise regimens. Other orders  -     atorvastatin (LIPITOR) 10 mg tablet; Take 1 Tab by mouth daily. -     levocetirizine (XYZAL) 5 mg tablet; Take 1 Tab by mouth daily. -     potassium chloride (K-DUR, KLOR-CON) 20 mEq tablet; Take 1 Tab by mouth daily. Follow-up Disposition:  Return in about 4 months (around 7/13/2017) for welcome to medicare visit, fasting blood work. I have discussed the diagnosis with the patient and the intended plan as seen in the above orders. The patient has received an after-visit summary and questions were answered concerning future plans. Patient conveyed understanding of the plan at the time of the visit.     Kallie Murillo NP

## 2017-03-27 ENCOUNTER — OFFICE VISIT (OUTPATIENT)
Dept: FAMILY MEDICINE CLINIC | Age: 67
End: 2017-03-27

## 2017-03-27 VITALS
DIASTOLIC BLOOD PRESSURE: 77 MMHG | TEMPERATURE: 98.4 F | WEIGHT: 220 LBS | HEART RATE: 82 BPM | SYSTOLIC BLOOD PRESSURE: 110 MMHG | BODY MASS INDEX: 37.56 KG/M2 | RESPIRATION RATE: 20 BRPM | HEIGHT: 64 IN | OXYGEN SATURATION: 95 %

## 2017-03-27 DIAGNOSIS — J11.1 INFLUENZA-LIKE ILLNESS: Primary | ICD-10-CM

## 2017-03-27 DIAGNOSIS — J44.1 COPD WITH ACUTE EXACERBATION (HCC): ICD-10-CM

## 2017-03-27 DIAGNOSIS — R68.89 FLU-LIKE SYMPTOMS: ICD-10-CM

## 2017-03-27 DIAGNOSIS — J02.9 SORE THROAT: ICD-10-CM

## 2017-03-27 LAB
QUICKVUE INFLUENZA TEST: NEGATIVE
S PYO AG THROAT QL: NEGATIVE
VALID INTERNAL CONTROL?: YES
VALID INTERNAL CONTROL?: YES

## 2017-03-27 RX ORDER — PREDNISONE 20 MG/1
40 TABLET ORAL DAILY
Qty: 8 TAB | Refills: 0 | Status: SHIPPED | OUTPATIENT
Start: 2017-03-27 | End: 2017-03-31

## 2017-03-27 RX ORDER — BENZONATATE 100 MG/1
100 CAPSULE ORAL
Qty: 30 CAP | Refills: 1 | Status: SHIPPED | OUTPATIENT
Start: 2017-03-27 | End: 2017-03-27 | Stop reason: SDUPTHER

## 2017-03-27 RX ORDER — DEXTROMETHORPHAN POLISTIREX 30 MG/5ML
60 SUSPENSION ORAL 2 TIMES DAILY
Qty: 200 ML | Refills: 0 | Status: SHIPPED | OUTPATIENT
Start: 2017-03-27 | End: 2017-04-06

## 2017-03-27 RX ORDER — DEXTROMETHORPHAN POLISTIREX 30 MG/5ML
60 SUSPENSION ORAL 2 TIMES DAILY
Qty: 200 ML | Refills: 0 | Status: SHIPPED | OUTPATIENT
Start: 2017-03-27 | End: 2017-03-27 | Stop reason: SDUPTHER

## 2017-03-27 RX ORDER — BENZONATATE 100 MG/1
100 CAPSULE ORAL
Qty: 30 CAP | Refills: 1 | Status: SHIPPED | OUTPATIENT
Start: 2017-03-27 | End: 2017-04-03

## 2017-03-27 RX ORDER — PSEUDOEPHEDRINE HCL 30 MG
30 TABLET ORAL
Qty: 15 TAB | Refills: 0 | Status: SHIPPED | OUTPATIENT
Start: 2017-03-27 | End: 2017-03-30

## 2017-03-27 RX ORDER — PSEUDOEPHEDRINE HCL 30 MG
30 TABLET ORAL
Qty: 15 TAB | Refills: 0 | Status: SHIPPED | OUTPATIENT
Start: 2017-03-27 | End: 2017-03-27 | Stop reason: SDUPTHER

## 2017-03-27 RX ORDER — IBUPROFEN 800 MG/1
800 TABLET ORAL
Qty: 30 TAB | Refills: 0 | Status: SHIPPED | OUTPATIENT
Start: 2017-03-27 | End: 2017-03-27 | Stop reason: SDUPTHER

## 2017-03-27 RX ORDER — POTASSIUM CHLORIDE 20 MEQ/1
20 TABLET, EXTENDED RELEASE ORAL 3 TIMES DAILY
Qty: 270 TAB | Refills: 1 | Status: SHIPPED | OUTPATIENT
Start: 2017-03-27 | End: 2017-09-05 | Stop reason: SDUPTHER

## 2017-03-27 RX ORDER — IBUPROFEN 800 MG/1
800 TABLET ORAL
Qty: 30 TAB | Refills: 0 | Status: SHIPPED | OUTPATIENT
Start: 2017-03-27 | End: 2018-04-08

## 2017-03-27 RX ORDER — PREDNISONE 20 MG/1
40 TABLET ORAL DAILY
Qty: 8 TAB | Refills: 0 | Status: SHIPPED | OUTPATIENT
Start: 2017-03-27 | End: 2017-03-27 | Stop reason: SDUPTHER

## 2017-03-27 NOTE — PROGRESS NOTES
Subjective:   Marni Souza is a 61 y.o. female who present complaining of flu-like symptoms: fevers, chills, myalgias, fatigue, congestion, sore throat, clear rhinorrhea and nonproductive cough for 3 days. Tmax 100, has been taking OTC ibuprofen for relief. Has been so fatigued has not been out of bed much in the past 2 days  She has dyspnea with exertion, denies wheezing. Has not tried using her albuterol inhaler. Good compliance with dulera. Smoking status: non-smoker. Flu vaccine status: vaccinated currently. Relevant PMH: COPD. Review of Systems  A comprehensive review of systems was negative except for that written in the HPI. Patient Active Problem List   Diagnosis Code    GERD (gastroesophageal reflux disease) K21.9    Obesity (BMI 30-39. 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  HX ENDOSCOPY  04/2014    HX GYN  1996    hysterectomy    HX OTHER SURGICAL  2006? L Lower Abd.  Basal Cell CA     Family History   Problem Relation Age of Onset    Hypertension Mother     Stroke Maternal Grandmother     Cancer Paternal Grandmother      breast cancer     Social History   Substance Use Topics    Smoking status: Former Smoker     Packs/day: 1.00     Years: 18.00     Types: Cigarettes     Quit date: 7/24/1988    Smokeless tobacco: Never Used      Comment: Quit in 1988    Alcohol use Yes      Comment: rarely       Objective:     Visit Vitals    /77 (BP 1 Location: Left arm, BP Patient Position: Sitting)    Pulse 82    Temp 98.4 °F (36.9 °C) (Oral)    Resp 20    Ht 5' 4\" (1.626 m)    Wt 220 lb (99.8 kg)    LMP 01/01/1996    SpO2 95%    BMI 37.76 kg/m2       Appears moderately ill but not toxic; temperature as noted in vitals. Ears left normal, right mild erythema without dullness. Throat and pharynx moderate erythema. Neck supple. No adenopathy in the neck. Sinuses non tender. The chest is clear. CV RRR no m/r/g. Skin normal turgor and coloration. Neuro no gross deficits. Results for orders placed or performed in visit on 03/27/17   AMB POC RAPID INFLUENZA TEST   Result Value Ref Range    VALID INTERNAL CONTROL POC Yes     QuickVue Influenza test Negative Negative   AMB POC RAPID STREP A   Result Value Ref Range    VALID INTERNAL CONTROL POC Yes     Group A Strep Ag Negative Negative         Assessment/Plan:   Influenza very likely from clinical presentation and seasonal pattern  Considerations for specific influenza anti-viral therapy: symptoms present > 48 hours, antiviral therapy unlikely to be effective  Symptomatic therapy suggested: rest, increase fluids, OTC ibuprofen, Sudafed, Delsym, bland diet and call prn if symptoms persist or worsen. Call or return to clinic prn if these symptoms worsen or fail to improve as anticipated. Krystle Gamboa was seen today for fever, cough, pressure behind the eyes, other and sore throat. Diagnoses and all orders for this visit:    Influenza-like illness  Rapid flu negative, but clinical presentation strongly suggests influenza  Rest  Fluids  Symptom management    -     benzonatate (TESSALON) 100 mg capsule;  Take 1 Cap by mouth three (3) times daily as needed for Cough for up to 7 days. -     ibuprofen (MOTRIN) 800 mg tablet; Take 1 Tab by mouth every eight (8) hours as needed for Pain.  -     dextromethorphan (DELSYM) 30 mg/5 mL liquid; Take 10 mL by mouth two (2) times a day for 10 days. -     pseudoephedrine (SUDAFED) 30 mg tablet; Take 1 Tab by mouth every six (6) hours as needed for Congestion for up to 3 days. COPD with acute exacerbation (HCC)  Mild exacerbation secondary to illness  Add Rx  Albuterol prn   -     predniSONE (DELTASONE) 20 mg tablet; Take 2 Tabs by mouth daily for 4 days. Flu-like symptoms  -     AMB POC RAPID INFLUENZA TEST    Sore throat  -     AMB POC RAPID STREP A    Other orders  -     potassium chloride (K-DUR, KLOR-CON) 20 mEq tablet; Take 1 Tab by mouth three (3) times daily. Follow-up Disposition:  Return if symptoms worsen or fail to improve. I have discussed the diagnosis with the patient and the intended plan as seen in the above orders. The patient has received an after-visit summary and questions were answered concerning future plans. Patient conveyed understanding of the plan at the time of the visit.     Scarlett Oh, GEOVANY  03/27/17

## 2017-03-27 NOTE — MR AVS SNAPSHOT
Visit Information Date & Time Provider Department Dept. Phone Encounter #  
 3/27/2017 10:30 AM Steve James NP [address] [facility] [id number] Follow-up Instructions Return if symptoms worsen or fail to improve. Your Appointments 7/21/2017  9:30 AM  
ESTABLISHED PATIENT with Adelfo Esteban MD  
[facility] at [address] [facility]) Appt Note: Raddin, iron deficiency fu  
 [address] [facility], [address] [place] [address]  
[phone]  
  
   
 [address] [place] [address], [address] [facility] Upcoming Health Maintenance Date Due Hepatitis C Screening 1950 Pneumococcal 19-64 Medium Risk (1 of 1 - PPSV23) 4/21/1971 DTaP/Tdap/Td series (1 - Tdap) 4/21/1973 PAP AKA CERVICAL CYTOLOGY 4/21/1973 ZOSTER VACCINE AGE 60> 4/21/2012 BREAST CANCER SCRN MAMMOGRAM 4/14/2016 INFLUENZA AGE 9 TO ADULT 8/1/2016 COLONOSCOPY 4/9/2024 Allergies as of 3/27/2017  Review Complete On: 3/27/2017 By: Steve James NP Severity Noted Reaction Type Reactions Loratadine  12/08/2011    Nausea Only, Other (comments) Muscle weakness Current Immunizations  Reviewed on 7/29/2016 Name Date Hib (PRP-T) 6/30/2015 Influenza Vaccine 12/17/2015, 10/22/2015 Influenza Vaccine Split 10/26/2012, 10/7/2011 Not reviewed this visit You Were Diagnosed With   
  
 Codes Comments Influenza-like illness    -  Primary ICD-10-CM: R69 
ICD-9-CM: 799.89   
 COPD with acute exacerbation ([facility]     ICD-10-CM: J44.1 ICD-9-CM: 491.21 Flu-like symptoms     ICD-10-CM: R68.89 ICD-9-CM: 780.99 Sore throat     ICD-10-CM: J02.9 ICD-9-CM: 698 Vitals BP Pulse Temp Resp Height(growth percentile) Weight(growth percentile) 110/77 (BP 1 Location: Left arm, BP Patient Position: Sitting) 82 98.4 °F (36.9 °C) (Oral) 20 5' 4\" (1.626 m) 220 lb (99.8 kg) LMP SpO2 BMI OB Status Smoking Status 01/01/1996 95% 37.76 kg/m2 Hysterectomy Former Smoker BMI and BSA Data Body Mass Index Body Surface Area  
 37.76 kg/m 2 2.12 m 2 Preferred Pharmacy Pharmacy Name Phone [address] [phone] Your Updated Medication List  
  
   
This list is accurate as of: 3/27/17 11:04 AM.  Always use your most recent med list.  
  
  
  
  
 albuterol 90 mcg/actuation inhaler Commonly known as:  VENTOLIN HFA Take 2 Puffs by inhalation every six (6) hours as needed for Wheezing or Shortness of Breath (may also use for cough). ALPRAZolam 0.5 mg tablet Commonly known as:  Volanda Pronto Take 1 Tab by mouth two (2) times daily as needed for Anxiety. Max Daily Amount: 1 mg.  
  
 atorvastatin 10 mg tablet Commonly known as:  LIPITOR Take 1 Tab by mouth daily. benzonatate 100 mg capsule Commonly known as:  TESSALON Take 1 Cap by mouth three (3) times daily as needed for Cough for up to 7 days. dextromethorphan 30 mg/5 mL liquid Commonly known as:  Delsym Take 10 mL by mouth two (2) times a day for 10 days. hydrocortisone 2.5 % topical cream  
Commonly known as:  HYTONE Apply  to affected area two (2) times a day. use thin layer  
  
 ibuprofen 800 mg tablet Commonly known as:  MOTRIN Take 1 Tab by mouth every eight (8) hours as needed for Pain.  
  
 lansoprazole 30 mg capsule Commonly known as:  PREVACID TAKE 1 CAPSULE DAILY BEFORE BREAKFAST  
  
 levocetirizine 5 mg tablet Commonly known as:  Carly Boyers Take 1 Tab by mouth daily. losartan 25 mg tablet Commonly known as:  COZAAR Take 1 Tab by mouth daily. mometasone-formoterol 200-5 mcg/actuation HFA inhaler Commonly known as:  Marivel Batter Take 2 Puffs by inhalation two (2) times a day. montelukast 10 mg tablet Commonly known as:  SINGULAIR  
TAKE 1 TABLET DAILY PARoxetine 20 mg tablet Commonly known as:  PAXIL Take 1 Tab by mouth daily. potassium chloride 20 mEq tablet Commonly known as:  K-DUR, KLOR-CON Take 1 Tab by mouth daily. predniSONE 20 mg tablet Commonly known as:  Thais Mons Take 2 Tabs by mouth daily for 4 days. pseudoephedrine 30 mg tablet Commonly known as:  SUDAFED Take 1 Tab by mouth every six (6) hours as needed for Congestion for up to 3 days. sodium bicarb-sodium chloride Kit Commonly known as:  NEILMED SINUS RINSE COMPLETE  
1 Packet by Nasal route daily. TOPROL XL 25 mg XL tablet Generic drug:  metoprolol succinate  
  
 triamterene-hydroCHLOROthiazide 37.5-25 mg per tablet Commonly known as:  Luis Felipe Haver Take 1 Tab by mouth daily. Prescriptions Sent to Pharmacy Refills  
 benzonatate (TESSALON) 100 mg capsule 1 Sig: Take 1 Cap by mouth three (3) times daily as needed for Cough for up to 7 days. Class: Normal  
 Pharmacy: [address], [address] Ph #: [phone] Route: Oral  
 ibuprofen (MOTRIN) 800 mg tablet 0 Sig: Take 1 Tab by mouth every eight (8) hours as needed for Pain. Class: Normal  
 Pharmacy: [address], [address] Ph #: [phone] Route: Oral  
 dextromethorphan (DELSYM) 30 mg/5 mL liquid 0 Sig: Take 10 mL by mouth two (2) times a day for 10 days. Class: Normal  
 Pharmacy: [address], [address] Ph #: [phone] Route: Oral  
 pseudoephedrine (SUDAFED) 30 mg tablet 0 Sig: Take 1 Tab by mouth every six (6) hours as needed for Congestion for up to 3 days. Class: Normal  
 Pharmacy: [address], [address] Ph #: [phone] Route: Oral  
 predniSONE (DELTASONE) 20 mg tablet 0 Sig: Take 2 Tabs by mouth daily for 4 days.   
 Class: Normal  
 Pharmacy: [address], [address] [place] [address] #: [phone] Route: Oral  
  
We Performed the Following AMB POC RAPID INFLUENZA TEST [22995 CPT(R)] AMB POC RAPID STREP A [49181 CPT(R)] Follow-up Instructions Return if symptoms worsen or fail to improve. Patient Instructions Influenza (Flu): Care Instructions Your Care Instructions Influenza (flu) is an infection in the lungs and breathing passages. It is caused by the influenza virus. There are different strains, or types, of the flu virus from year to year. Unlike the common cold, the flu comes on suddenly and the symptoms, such as a cough, congestion, fever, chills, fatigue, aches, and pains, are more severe. These symptoms may last up to 10 days. Although the flu can make you feel very sick, it usually doesn't cause serious health problems. Home treatment is usually all you need for flu symptoms. But your doctor may prescribe antiviral medicine to prevent other health problems, such as pneumonia, from developing. Older people and those who have a long-term health condition, such as lung disease, are most at risk for having pneumonia or other health problems. Follow-up care is a key part of your treatment and safety. Be sure to make and go to all appointments, and call your doctor if you are having problems. Its also a good idea to know your test results and keep a list of the medicines you take. How can you care for yourself at home? · Get plenty of rest. 
· Drink plenty of fluids, enough so that your urine is light yellow or clear like water. If you have kidney, heart, or liver disease and have to limit fluids, talk with your doctor before you increase the amount of fluids you drink.  
· Take an over-the-counter pain medicine if needed, such as acetaminophen (Tylenol), ibuprofen (Advil, Motrin), or naproxen (Aleve), to relieve fever, headache, and muscle aches. Read and follow all instructions on the label. No one younger than 20 should take aspirin. It has been linked to Reye syndrome, a serious illness. · Do not smoke. Smoking can make the flu worse. If you need help quitting, talk to your doctor about stop-smoking programs and medicines. These can increase your chances of quitting for good. · Breathe moist air from a hot shower or from a sink filled with hot water to help clear a stuffy nose. · Before you use cough and cold medicines, check the label. These medicines may not be safe for young children or for people with certain health problems. · If the skin around your nose and lips becomes sore, put some petroleum jelly on the area. · To ease coughing: ¨ Drink fluids to soothe a scratchy throat. ¨ Suck on cough drops or plain hard candy. ¨ Take an over-the-counter cough medicine that contains dextromethorphan to help you get some sleep. Read and follow all instructions on the label. ¨ Raise your head at night with an extra pillow. This may help you rest if coughing keeps you awake. · Take any prescribed medicine exactly as directed. Call your doctor if you think you are having a problem with your medicine. To avoid spreading the flu · Wash your hands regularly, and keep your hands away from your face. · Stay home from school, work, and other public places until you are feeling better and your fever has been gone for at least 24 hours. The fever needs to have gone away on its own without the help of medicine. · Ask people living with you to talk to their doctors about preventing the flu. They may get antiviral medicine to keep from getting the flu from you. · To prevent the flu in the future, get a flu vaccine every fall. Encourage people living with you to get the vaccine. · Cover your mouth when you cough or sneeze. When should you call for help? Call 911 anytime you think you may need emergency care. For example, call if: 
· You have severe trouble breathing. Call your doctor now or seek immediate medical care if: 
· You have new or worse trouble breathing. · You seem to be getting much sicker. · You feel very sleepy or confused. · You have a new or higher fever. · You get a new rash. Watch closely for changes in your health, and be sure to contact your doctor if: 
· You begin to get better and then get worse. · You are not getting better after 1 week. Where can you learn more? Go to http://negrita-zuleyka.info/. Enter L145 in the search box to learn more about \"Influenza (Flu): Care Instructions. \" Current as of: May 23, 2016 Content Version: 11.1 © 5310-5629 Healthwise, Incorporated. Care instructions adapted under license by [employer] (which disclaims liability or warranty for this information). If you have questions about a medical condition or this instruction, always ask your healthcare professional. [place] [address] any warranty or liability for your use of this information. Introducing [facility]! Anjel Ott introduces NearVerse patient portal. Now you can access parts of your medical record, email your doctor's office, and request medication refills online. 1. In your internet browser, go to [email or web address] 2. Click on the First Time User? Click Here link in the Sign In box. You will see the New Member Sign Up page. 3. Enter your NearVerse Access Code exactly as it appears below. You will not need to use this code after youve completed the sign-up process. If you do not sign up before the expiration date, you must request a new code. · NearVerse Access Code: [email or web address] Expires: 6/11/2017 10:57 AM 
 
4. Enter the last four digits of your Social Security Number (xxxx) and Date of Birth (mm/dd/yyyy) as indicated and click Submit.  You will be taken to the next sign-up page. 5. Create a [employer] ID. This will be your [employer] login ID and cannot be changed, so think of one that is secure and easy to remember. 6. Create a [employer] password. You can change your password at any time. 7. Enter your Password Reset Question and Answer. This can be used at a later time if you forget your password. 8. Enter your e-mail address. You will receive e-mail notification when new information is available in [address]. 9. Click Sign Up. You can now view and download portions of your medical record. 10. Click the Download Summary menu link to download a portable copy of your medical information. If you have questions, please visit the Frequently Asked Questions section of the [employer] website. Remember, [employer] is NOT to be used for urgent needs. For medical emergencies, dial 911. Now available from your iPhone and Android! Please provide this summary of care documentation to your next provider. Your primary care clinician is listed as Kiran Arshad. If you have any questions after today's visit, please call [phone].

## 2017-03-27 NOTE — PROGRESS NOTES
Chief Complaint   Patient presents with    Fever     x 3 days     Cough     x 3 days     Pressure Behind the Eyes     x 3 days     Other     chest tightness x 3days     Sore Throat     x 3 days      1. Have you been to the ER, urgent care clinic since your last visit? Hospitalized since your last visit? no    2. Have you seen or consulted any other health care providers outside of the [facility] since your last visit? Include any pap smears or colon screening.  no

## 2017-03-27 NOTE — PATIENT INSTRUCTIONS
Influenza (Flu): Care Instructions  Your Care Instructions  Influenza (flu) is an infection in the lungs and breathing passages. It is caused by the influenza virus. There are different strains, or types, of the flu virus from year to year. Unlike the common cold, the flu comes on suddenly and the symptoms, such as a cough, congestion, fever, chills, fatigue, aches, and pains, are more severe. These symptoms may last up to 10 days. Although the flu can make you feel very sick, it usually doesn't cause serious health problems. Home treatment is usually all you need for flu symptoms. But your doctor may prescribe antiviral medicine to prevent other health problems, such as pneumonia, from developing. Older people and those who have a long-term health condition, such as lung disease, are most at risk for having pneumonia or other health problems. Follow-up care is a key part of your treatment and safety. Be sure to make and go to all appointments, and call your doctor if you are having problems. Its also a good idea to know your test results and keep a list of the medicines you take. How can you care for yourself at home? · Get plenty of rest.  · Drink plenty of fluids, enough so that your urine is light yellow or clear like water. If you have kidney, heart, or liver disease and have to limit fluids, talk with your doctor before you increase the amount of fluids you drink. · Take an over-the-counter pain medicine if needed, such as acetaminophen (Tylenol), ibuprofen (Advil, Motrin), or naproxen (Aleve), to relieve fever, headache, and muscle aches. Read and follow all instructions on the label. No one younger than 20 should take aspirin. It has been linked to Reye syndrome, a serious illness. · Do not smoke. Smoking can make the flu worse. If you need help quitting, talk to your doctor about stop-smoking programs and medicines. These can increase your chances of quitting for good.   · Breathe moist air from a hot shower or from a sink filled with hot water to help clear a stuffy nose. · Before you use cough and cold medicines, check the label. These medicines may not be safe for young children or for people with certain health problems. · If the skin around your nose and lips becomes sore, put some petroleum jelly on the area. · To ease coughing:  ¨ Drink fluids to soothe a scratchy throat. ¨ Suck on cough drops or plain hard candy. ¨ Take an over-the-counter cough medicine that contains dextromethorphan to help you get some sleep. Read and follow all instructions on the label. ¨ Raise your head at night with an extra pillow. This may help you rest if coughing keeps you awake. · Take any prescribed medicine exactly as directed. Call your doctor if you think you are having a problem with your medicine. To avoid spreading the flu  · Wash your hands regularly, and keep your hands away from your face. · Stay home from school, work, and other public places until you are feeling better and your fever has been gone for at least 24 hours. The fever needs to have gone away on its own without the help of medicine. · Ask people living with you to talk to their doctors about preventing the flu. They may get antiviral medicine to keep from getting the flu from you. · To prevent the flu in the future, get a flu vaccine every fall. Encourage people living with you to get the vaccine. · Cover your mouth when you cough or sneeze. When should you call for help? Call 911 anytime you think you may need emergency care. For example, call if:  · You have severe trouble breathing. Call your doctor now or seek immediate medical care if:  · You have new or worse trouble breathing. · You seem to be getting much sicker. · You feel very sleepy or confused. · You have a new or higher fever. · You get a new rash.   Watch closely for changes in your health, and be sure to contact your doctor if:  · You begin to get better and then get worse. · You are not getting better after 1 week. Where can you learn more? Go to http://negrita-zuleyka.info/. Enter E110 in the search box to learn more about \"Influenza (Flu): Care Instructions. \"  Current as of: May 23, 2016  Content Version: 11.1  © 0557-1621 [employer]. Care instructions adapted under license by [employer] (which disclaims liability or warranty for this information). If you have questions about a medical condition or this instruction, always ask your healthcare professional. [address] any warranty or liability for your use of this information.

## 2017-04-05 ENCOUNTER — OFFICE VISIT (OUTPATIENT)
Dept: FAMILY MEDICINE CLINIC | Age: 67
End: 2017-04-05

## 2017-04-05 ENCOUNTER — HOSPITAL ENCOUNTER (OUTPATIENT)
Dept: GENERAL RADIOLOGY | Age: 67
Discharge: HOME | End: 2017-04-05
Payer: COMMERCIAL

## 2017-04-05 VITALS
HEIGHT: 64 IN | SYSTOLIC BLOOD PRESSURE: 111 MMHG | OXYGEN SATURATION: 95 % | DIASTOLIC BLOOD PRESSURE: 72 MMHG | TEMPERATURE: 98.2 F | RESPIRATION RATE: 20 BRPM | HEART RATE: 78 BPM | BODY MASS INDEX: 37.56 KG/M2 | WEIGHT: 220 LBS

## 2017-04-05 DIAGNOSIS — J44.1 COPD WITH ACUTE EXACERBATION (HCC): Primary | ICD-10-CM

## 2017-04-05 DIAGNOSIS — J44.1 COPD WITH ACUTE EXACERBATION (HCC): ICD-10-CM

## 2017-04-05 DIAGNOSIS — J20.9 ACUTE BRONCHITIS, UNSPECIFIED ORGANISM: ICD-10-CM

## 2017-04-05 PROCEDURE — 71020 XR CHEST PA LAT: CPT

## 2017-04-05 RX ORDER — NEBULIZER AND COMPRESSOR
EACH MISCELLANEOUS
Qty: 1 EACH | Refills: 0 | Status: SHIPPED | OUTPATIENT
Start: 2017-04-05

## 2017-04-05 RX ORDER — IPRATROPIUM BROMIDE 0.5 MG/2.5ML
0.5 SOLUTION RESPIRATORY (INHALATION) AS NEEDED
Qty: 62.5 ML | Refills: 0 | Status: SHIPPED | OUTPATIENT
Start: 2017-04-05

## 2017-04-05 RX ORDER — ALBUTEROL SULFATE 0.83 MG/ML
2.5 SOLUTION RESPIRATORY (INHALATION) ONCE
Qty: 1 EACH | Refills: 0
Start: 2017-04-05 | End: 2017-04-06 | Stop reason: CLARIF

## 2017-04-05 RX ORDER — ALBUTEROL SULFATE 0.83 MG/ML
2.5 SOLUTION RESPIRATORY (INHALATION)
Qty: 24 EACH | Refills: 1 | Status: SHIPPED | OUTPATIENT
Start: 2017-04-05 | End: 2021-09-14 | Stop reason: ALTCHOICE

## 2017-04-05 RX ORDER — IPRATROPIUM BROMIDE 0.5 MG/2.5ML
0.5 SOLUTION RESPIRATORY (INHALATION) AS NEEDED
Qty: 2.5 ML | Refills: 0
Start: 2017-04-05 | End: 2017-04-06 | Stop reason: CLARIF

## 2017-04-05 NOTE — MR AVS SNAPSHOT
Visit Information Date & Time Provider Department Dept. Phone Encounter #  
 4/5/2017  3:00 PM [facility] [phone] [id number] Follow-up Instructions Return in about 2 weeks (around 4/19/2017) for f/u COPD exacerbation. Your Appointments 7/21/2017  9:30 AM  
ESTABLISHED PATIENT with Bigg Lester MD  
[facility]) Appt Note: [place], iron deficiency fu  
 [address] [facility] [address], [address] [id number]  
[phone]  
  
   
 [address] [facility], [address] [facility] Upcoming Health Maintenance Date Due Hepatitis C Screening 1950 Pneumococcal 19-64 Medium Risk (1 of 1 - PPSV23) 4/21/1971 DTaP/Tdap/Td series (1 - Tdap) 4/21/1973 PAP AKA CERVICAL CYTOLOGY 4/21/1973 ZOSTER VACCINE AGE 60> 4/21/2012 BREAST CANCER SCRN MAMMOGRAM 4/14/2016 INFLUENZA AGE 9 TO ADULT 8/1/2016 COLONOSCOPY 4/9/2024 Allergies as of 4/5/2017  Review Complete On: 4/5/2017 By: Buffy Montejo Severity Noted Reaction Type Reactions Loratadine  12/08/2011    Nausea Only, Other (comments) Muscle weakness Current Immunizations  Reviewed on 7/29/2016 Name Date Hib (PRP-T) 6/30/2015 Influenza Vaccine 12/17/2015, 10/22/2015 Influenza Vaccine Split 10/26/2012, 10/7/2011 Not reviewed this visit You Were Diagnosed With   
  
 Codes Comments COPD with acute exacerbation ([facility]    -  Primary ICD-10-CM: J44.1 ICD-9-CM: 491.21 Acute bronchitis, unspecified organism     ICD-10-CM: J20.9 ICD-9-CM: 466.0 Vitals BP Pulse Temp Resp Height(growth percentile) Weight(growth percentile) 111/72 (BP 1 Location: Left arm, BP Patient Position: Sitting) 78 98.2 °F (36.8 °C) (Oral) 20 5' 4\" (1.626 m) 220 lb (99.8 kg) LMP SpO2 BMI OB Status Smoking Status 01/01/1996 95% 37.76 kg/m2 Hysterectomy Former Smoker Vitals History BMI and BSA Data Body Mass Index Body Surface Area  
 37.76 kg/m 2 2.12 m 2 Preferred Pharmacy Pharmacy Name Phone [facility] [address] [place] [address], [facility] [address] [place] [address] [phone] Your Updated Medication List  
  
   
This list is accurate as of: 4/5/17  3:40 PM.  Always use your most recent med list.  
  
  
  
  
 * albuterol 90 mcg/actuation inhaler Commonly known as:  VENTOLIN HFA Take 2 Puffs by inhalation every six (6) hours as needed for Wheezing or Shortness of Breath (may also use for cough). * albuterol 2.5 mg /3 mL (0.083 %) nebulizer solution Commonly known as:  PROVENTIL VENTOLIN  
3 mL by Nebulization route once for 1 dose. * albuterol 2.5 mg /3 mL (0.083 %) nebulizer solution Commonly known as:  PROVENTIL VENTOLIN  
3 mL by Nebulization route every four (4) hours as needed for Wheezing. ALPRAZolam 0.5 mg tablet Commonly known as:  Jemma Howard Take 1 Tab by mouth two (2) times daily as needed for Anxiety. Max Daily Amount: 1 mg.  
  
 atorvastatin 10 mg tablet Commonly known as:  LIPITOR Take 1 Tab by mouth daily. dextromethorphan 30 mg/5 mL liquid Commonly known as:  Delsym Take 10 mL by mouth two (2) times a day for 10 days. hydrocortisone 2.5 % topical cream  
Commonly known as:  HYTONE Apply  to affected area two (2) times a day. use thin layer  
  
 ibuprofen 800 mg tablet Commonly known as:  MOTRIN Take 1 Tab by mouth every eight (8) hours as needed for Pain. * ipratropium 0.02 % nebulizer solution Commonly known as:  ATROVENT  
2.5 mL by Nebulization route as needed for Wheezing. * ipratropium 17 mcg/actuation inhaler Commonly known as:  ATROVENT HFA Take 1 Puff by inhalation every six (6) hours as needed for Wheezing. * ipratropium 0.02 % nebulizer solution Commonly known as:  ATROVENT  
2.5 mL by Nebulization route as needed for Wheezing (every 6 hours as needed). lansoprazole 30 mg capsule Commonly known as:  PREVACID TAKE 1 CAPSULE DAILY BEFORE BREAKFAST  
  
 levocetirizine 5 mg tablet Commonly known as:  Avie Harsh Take 1 Tab by mouth daily. losartan 25 mg tablet Commonly known as:  COZAAR Take 1 Tab by mouth daily. mometasone-formoterol 200-5 mcg/actuation HFA inhaler Commonly known as:  Josiephine Olguin Take 2 Puffs by inhalation two (2) times a day. montelukast 10 mg tablet Commonly known as:  SINGULAIR  
TAKE 1 TABLET DAILY Nebulizer & Compressor machine Use as directed PARoxetine 20 mg tablet Commonly known as:  PAXIL Take 1 Tab by mouth daily. potassium chloride 20 mEq tablet Commonly known as:  K-DUR, KLOR-CON Take 1 Tab by mouth three (3) times daily. sodium bicarb-sodium chloride Kit Commonly known as:  NEILMED SINUS RINSE COMPLETE  
1 Packet by Nasal route daily. TOPROL XL 25 mg XL tablet Generic drug:  metoprolol succinate  
  
 triamterene-hydroCHLOROthiazide 37.5-25 mg per tablet Commonly known as:  Orvel Primus Take 1 Tab by mouth daily. * Notice: This list has 6 medication(s) that are the same as other medications prescribed for you. Read the directions carefully, and ask your doctor or other care provider to review them with you. Prescriptions Printed Refills Nebulizer & Compressor machine 0 Sig: Use as directed Class: Print Prescriptions Sent to Pharmacy Refills  
 albuterol (PROVENTIL VENTOLIN) 2.5 mg /3 mL (0.083 %) nebulizer solution 1 Sig: 3 mL by Nebulization route every four (4) hours as needed for Wheezing. Class: Normal  
 Pharmacy: [facility] [address] [place], [place] [address]y [address]  #: [phone] Route: Nebulization ipratropium (ATROVENT) 0.02 % nebulizer solution 0 Si.5 mL by Nebulization route as needed for Wheezing (every 6 hours as needed). Class: Normal  
 Pharmacy: [facility] [address] [place], [place] [address]y [address] [place] [address] #: [phone] Route: Nebulization We Performed the Following ALBUTEROL, INHAL. SOL., FDA-APPROVED FINAL, NON-COMPOUND UNIT DOSE, 1 MG [ [facility]] INHAL RX, AIRWAY OBST/DX SPUTUM INDUCT [id number] CPT(R)] IPRATROPIUM BROMIDE NON-COMP [ [facility]] Follow-up Instructions Return in about 2 weeks (around 2017) for f/u COPD exacerbation. To-Do List   
 2017 Imaging:  XR CHEST PA LAT Patient Instructions Bronchitis: Care Instructions Your Care Instructions Bronchitis is inflammation of the bronchial tubes, which carry air to the lungs. The tubes swell and produce mucus, or phlegm. The mucus and inflamed bronchial tubes make you cough. You may have trouble breathing. Most cases of bronchitis are caused by viruses like those that cause colds. Antibiotics usually do not help and they may be harmful. Bronchitis usually develops rapidly and lasts about 2 to 3 weeks in otherwise healthy people. Follow-up care is a key part of your treatment and safety. Be sure to make and go to all appointments, and call your doctor if you are having problems. It's also a good idea to know your test results and keep a list of the medicines you take. How can you care for yourself at home? · Take all medicines exactly as prescribed. Call your doctor if you think you are having a problem with your medicine. · Get some extra rest. 
· Take an over-the-counter pain medicine, such as acetaminophen (Tylenol), ibuprofen (Advil, Motrin), or naproxen (Aleve) to reduce fever and relieve body aches. Read and follow all instructions on the label.  
· Do not take two or more pain medicines at the same time unless the doctor told you to. Many pain medicines have acetaminophen, which is Tylenol. Too much acetaminophen (Tylenol) can be harmful. · Take an over-the-counter cough medicine that contains dextromethorphan to help quiet a dry, hacking cough so that you can sleep. Avoid cough medicines that have more than one active ingredient. Read and follow all instructions on the label. · Breathe moist air from a humidifier, hot shower, or sink filled with hot water. The heat and moisture will thin mucus so you can cough it out. · Do not smoke. Smoking can make bronchitis worse. If you need help quitting, talk to your doctor about stop-smoking programs and medicines. These can increase your chances of quitting for good. When should you call for help? Call 911 anytime you think you may need emergency care. For example, call if: 
· You have severe trouble breathing. Call your doctor now or seek immediate medical care if: 
· You have new or worse trouble breathing. · You cough up dark brown or bloody mucus (sputum). · You have a new or higher fever. · You have a new rash. Watch closely for changes in your health, and be sure to contact your doctor if: 
· You cough more deeply or more often, especially if you notice more mucus or a change in the color of your mucus. · You are not getting better as expected. Where can you learn more? Go to http://negrita-zuleyka.info/. Enter H333 in the search box to learn more about \"Bronchitis: Care Instructions. \" Current as of: May 23, 2016 Content Version: 11.2 © 3006-6993 Healthwise, Incorporated. Care instructions adapted under license by [employer] (which disclaims liability or warranty for this information). If you have questions about a medical condition or this instruction, always ask your healthcare professional. [address] any warranty or liability for your use of this information. Chronic Obstructive Pulmonary Disease (COPD) Flare-Ups: Care Instructions Your Care Instructions Chronic obstructive pulmonary disease (COPD) is a lung disease that makes it hard to breathe. It is caused by damage to the lungs over many years, usually from smoking. COPD is often a mix of two diseases: · Chronic bronchitis: The airways that carry air to the lungs (bronchial tubes) get inflamed and make a lot of mucus. This can narrow or block the airways. · Emphysema: In a healthy person, the tiny air sacs in the lungs are like balloons. As you breathe in and out, they get bigger and smaller to move air through your lungs. But with emphysema, these air sacs are damaged and lose their stretch. Less air gets in and out of the lungs. Many people with COPD have attacks called flare-ups or exacerbations. This is when your usual symptoms quickly get worse and stay worse. The doctor has checked you carefully. But problems can develop later. If you notice any problems or new symptoms, get medical treatment right away. Follow-up care is a key part of your treatment and safety. Be sure to make and go to all appointments, and call your doctor if you are having problems. It's also a good idea to know your test results and keep a list of the medicines you take. How can you care for yourself at home? · Be safe with medicines. Take your medicines exactly as prescribed. Call your doctor if you think you are having a problem with your medicine. You may be taking medicines such as: ¨ Bronchodilators. These help open your airways and make breathing easier. ¨ Corticosteroids. These reduce airway inflammation. They may be given as pills, in a vein, or in an inhaled form. You may go home with pills in addition to an inhaler that you already use. · A spacer may help you get more inhaled medicine to your lungs. Ask your doctor or pharmacist if a spacer is right for you. If it is, ask how to use it properly. · If your doctor prescribed antibiotics, take them as directed. Do not stop taking them just because you feel better. You need to take the full course of antibiotics. · If your doctor prescribed oxygen, use the flow rate your doctor has recommended. Do not change it without talking to your doctor first. 
· Do not smoke. Smoking makes COPD worse. If you need help quitting, talk to your doctor about stop-smoking programs and medicines. These can increase your chances of quitting for good. When should you call for help? Call 911 anytime you think you may need emergency care. For example, call if: 
· You have severe trouble breathing. Call your doctor now or seek immediate medical care if: 
· You have new or worse trouble breathing. · Your coughing or wheezing gets worse. · You cough up dark brown or bloody mucus (sputum). · You have a new or higher fever. Watch closely for changes in your health, and be sure to contact your doctor if: 
· You notice more mucus or a change in the color of your mucus. · You need to use your antibiotic or steroid pills. · You do not get better as expected. Where can you learn more? Go to http://negrita-zuleyka.info/. Enter J604 in the search box to learn more about \"Chronic Obstructive Pulmonary Disease (COPD) Flare-Ups: Care Instructions. \" Current as of: July 21, 2016 Content Version: 11.2 © 6660-0998 [employer]Jet, Incorporated. Care instructions adapted under license by [employer] (which disclaims liability or warranty for this information). If you have questions about a medical condition or this instruction, always ask your healthcare professional. [place] [address] any warranty or liability for your use of this information. Introducing [facility]! [facility] introduces [employer] patient portal. Now you can access parts of your medical record, email your doctor's office, and request medication refills online. 1. In your internet browser, go to [email or web address] 2. Click on the First Time User? Click Here link in the Sign In box. You will see the New Member Sign Up page. 3. Enter your [facility] Access Code exactly as it appears below. You will not need to use this code after youve completed the sign-up process. If you do not sign up before the expiration date, you must request a new code. · [employer] [facility] [employer] Access Code: [email or web address] Expires: 6/11/2017 10:57 AM 
 
4. Enter the last four digits of your Social Security Number (xxxx) and Date of Birth (mm/dd/yyyy) as indicated and click Submit. You will be taken to the next sign-up page. 5. Create a [facility] ID. This will be your [facility] login ID and cannot be changed, so think of one that is secure and easy to remember. 6. Create a [employer] password. You can change your password at any time. 7. Enter your Password Reset Question and Answer. This can be used at a later time if you forget your password. 8. Enter your e-mail address. You will receive e-mail notification when new information is available in [address] 9. Click Sign Up. You can now view and download portions of your medical record. 10. Click the Download Summary menu link to download a portable copy of your medical information. If you have questions, please visit the Frequently Asked Questions section of the [employer] website. Remember, [facility] [employer] is NOT to be used for urgent needs. For medical emergencies, dial 911. Now available from your iPhone and Android! Please provide this summary of care documentation to your next provider. Your primary care clinician is listed as Venus Hernandez. If you have any questions after today's visit, please call [phone].

## 2017-04-05 NOTE — PATIENT INSTRUCTIONS
Bronchitis: Care Instructions  Your Care Instructions    Bronchitis is inflammation of the bronchial tubes, which carry air to the lungs. The tubes swell and produce mucus, or phlegm. The mucus and inflamed bronchial tubes make you cough. You may have trouble breathing. Most cases of bronchitis are caused by viruses like those that cause colds. Antibiotics usually do not help and they may be harmful. Bronchitis usually develops rapidly and lasts about 2 to 3 weeks in otherwise healthy people. Follow-up care is a key part of your treatment and safety. Be sure to make and go to all appointments, and call your doctor if you are having problems. It's also a good idea to know your test results and keep a list of the medicines you take. How can you care for yourself at home? · Take all medicines exactly as prescribed. Call your doctor if you think you are having a problem with your medicine. · Get some extra rest.  · Take an over-the-counter pain medicine, such as acetaminophen (Tylenol), ibuprofen (Advil, Motrin), or naproxen (Aleve) to reduce fever and relieve body aches. Read and follow all instructions on the label. · Do not take two or more pain medicines at the same time unless the doctor told you to. Many pain medicines have acetaminophen, which is Tylenol. Too much acetaminophen (Tylenol) can be harmful. · Take an over-the-counter cough medicine that contains dextromethorphan to help quiet a dry, hacking cough so that you can sleep. Avoid cough medicines that have more than one active ingredient. Read and follow all instructions on the label. · Breathe moist air from a humidifier, hot shower, or sink filled with hot water. The heat and moisture will thin mucus so you can cough it out. · Do not smoke. Smoking can make bronchitis worse. If you need help quitting, talk to your doctor about stop-smoking programs and medicines. These can increase your chances of quitting for good.   When should you call for help? Call 911 anytime you think you may need emergency care. For example, call if:  · You have severe trouble breathing. Call your doctor now or seek immediate medical care if:  · You have new or worse trouble breathing. · You cough up dark brown or bloody mucus (sputum). · You have a new or higher fever. · You have a new rash. Watch closely for changes in your health, and be sure to contact your doctor if:  · You cough more deeply or more often, especially if you notice more mucus or a change in the color of your mucus. · You are not getting better as expected. Where can you learn more? Go to http://negrita-zuleyka.info/. Enter H333 in the search box to learn more about \"Bronchitis: Care Instructions. \"  Current as of: May 23, 2016  Content Version: 11.2  © 7877-1546 [employer]. Care instructions adapted under license by [employer] (which disclaims liability or warranty for this information). If you have questions about a medical condition or this instruction, always ask your healthcare professional. [place] [address] any warranty or liability for your use of this information. Chronic Obstructive Pulmonary Disease (COPD) Flare-Ups: Care Instructions  Your Care Instructions    Chronic obstructive pulmonary disease (COPD) is a lung disease that makes it hard to breathe. It is caused by damage to the lungs over many years, usually from smoking. COPD is often a mix of two diseases:  · Chronic bronchitis: The airways that carry air to the lungs (bronchial tubes) get inflamed and make a lot of mucus. This can narrow or block the airways. · Emphysema: In a healthy person, the tiny air sacs in the lungs are like balloons. As you breathe in and out, they get bigger and smaller to move air through your lungs. But with emphysema, these air sacs are damaged and lose their stretch. Less air gets in and out of the lungs.   Many people with COPD have attacks called flare-ups or exacerbations. This is when your usual symptoms quickly get worse and stay worse. The doctor has checked you carefully. But problems can develop later. If you notice any problems or new symptoms, get medical treatment right away. Follow-up care is a key part of your treatment and safety. Be sure to make and go to all appointments, and call your doctor if you are having problems. It's also a good idea to know your test results and keep a list of the medicines you take. How can you care for yourself at home? · Be safe with medicines. Take your medicines exactly as prescribed. Call your doctor if you think you are having a problem with your medicine. You may be taking medicines such as:  ¨ Bronchodilators. These help open your airways and make breathing easier. ¨ Corticosteroids. These reduce airway inflammation. They may be given as pills, in a vein, or in an inhaled form. You may go home with pills in addition to an inhaler that you already use. · A spacer may help you get more inhaled medicine to your lungs. Ask your doctor or pharmacist if a spacer is right for you. If it is, ask how to use it properly. · If your doctor prescribed antibiotics, take them as directed. Do not stop taking them just because you feel better. You need to take the full course of antibiotics. · If your doctor prescribed oxygen, use the flow rate your doctor has recommended. Do not change it without talking to your doctor first.  · Do not smoke. Smoking makes COPD worse. If you need help quitting, talk to your doctor about stop-smoking programs and medicines. These can increase your chances of quitting for good. When should you call for help? Call 911 anytime you think you may need emergency care. For example, call if:  · You have severe trouble breathing. Call your doctor now or seek immediate medical care if:  · You have new or worse trouble breathing. · Your coughing or wheezing gets worse.   · You cough up dark brown or bloody mucus (sputum). · You have a new or higher fever. Watch closely for changes in your health, and be sure to contact your doctor if:  · You notice more mucus or a change in the color of your mucus. · You need to use your antibiotic or steroid pills. · You do not get better as expected. Where can you learn more? Go to http://negrita-zuleyka.info/. Enter P259 in the search box to learn more about \"Chronic Obstructive Pulmonary Disease (COPD) Flare-Ups: Care Instructions. \"  Current as of: July 21, 2016  Content Version: 11.2  © 7110-0455 [employer]Skill[employer] Technologies. Care instructions adapted under license by [employer] (which disclaims liability or warranty for this information). If you have questions about a medical condition or this instruction, always ask your healthcare professional. [address] any warranty or liability for your use of this information.

## 2017-04-06 ENCOUNTER — TELEPHONE (OUTPATIENT)
Dept: FAMILY MEDICINE CLINIC | Age: 67
End: 2017-04-06

## 2017-04-06 RX ORDER — IPRATROPIUM BROMIDE AND ALBUTEROL SULFATE 2.5; .5 MG/3ML; MG/3ML
3 SOLUTION RESPIRATORY (INHALATION)
Qty: 1 NEBULE | Refills: 0
Start: 2017-04-06 | End: 2017-04-06

## 2017-04-07 RX ORDER — IPRATROPIUM BROMIDE AND ALBUTEROL SULFATE 2.5; .5 MG/3ML; MG/3ML
3 SOLUTION RESPIRATORY (INHALATION)
Qty: 3 ML | Refills: 0
Start: 2017-04-07 | End: 2017-04-07

## 2017-04-07 NOTE — TELEPHONE ENCOUNTER
Orders faxed to [address] [place] [address] whom participates with patients insurance.  Per processing protocol will be delivered today

## 2017-04-07 NOTE — PROGRESS NOTES
Christa Cochran is a 61 y.o. female who presents with the following complaints:  Chief Complaint   Patient presents with    Fatigue     Feeling overall better from flu like symptoms, however c/o fatigue & SOB     Shortness of Breath       Subjective:    HPI:   Overall reports symptoms of influenza-like illness gradually improving, no fever, cough less frequent, non-productive, but has noted fatigue and shortness of breath with household activities over the past few days. Does not think she has been wheezing. Has used albuterol inhaler but has not noted much improvement. Reports good compliance with dulera BID for COPD. She'd concerned she may have pneumonia. Pertinent PMH/FH/SH:  Past Medical History:   Diagnosis Date    Anemia     COPD (chronic obstructive pulmonary disease) (HCC)     GERD (gastroesophageal reflux disease)     Hypercholesterolemia     Hypertension     Obesity (BMI 30-39. 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  HX ENDOSCOPY  04/2014    HX GYN  1996    hysterectomy    HX OTHER SURGICAL  2006? L [place] Abd.  Basal Cell CA     Family History   Problem Relation Age of Onset    Hypertension Mother     Stroke Maternal Grandmother     Cancer Paternal Grandmother      breast cancer     Social History     Social History    Marital status:      Spouse name: N/A    Number of children: N/A    Years of education: N/A     Social History Main Topics    Smoking status: Former Smoker     Packs/day: 1.00     Years: 18.00     Types: Cigarettes     Quit date: 7/24/1988    Smokeless tobacco: Never Used      Comment: Quit in 1988    Alcohol use Yes      Comment: rarely    Drug use: No    Sexual activity: Yes     Partners: Male     Birth control/ protection: Surgical      Comment: Hysterectomy     Other Topics Concern    None     Social History Narrative     Advanced Directives: N      Patient Active Problem List    Diagnosis    Iron deficiency anemia due to chronic blood loss    Vestibulopathy    Dizzy    Gait disturbance    Bradycardia    Obesity (BMI 30-39. 9)    COPD (chronic obstructive pulmonary disease) ([address]    Hypertension    Hypercholesterolemia    Vertigo    GERD (gastroesophageal reflux disease)       Nurse notes were reviewed and are correct  Review of Systems - negative except as listed above in the HPI    Objective:     Vitals:    04/05/17 1509   BP: 111/72   Pulse: 78   Resp: 20   Temp: 98.2 °F (36.8 °C)   TempSrc: Oral   SpO2: 95%   Weight: 220 lb (99.8 kg)   Height: 5' 4\" (1.626 m)     Physical Examination: General appearance - alert, well appearing, and in no distress and oriented to person, place, and time  Mental status - normal mood, behavior, speech, dress, motor activity, and thought processes  Eyes - pupils equal and reactive, extraocular eye movements intact  Ears - bilateral TM's and external ear canals normal  Nose - mucosal congestion, clear rhinorrhea and sinuses normal and nontender  Mouth - mild oropharyngeal erythema  Neck - supple, no significant adenopathy  Chest - clear to auscultation, no wheezes, rales or rhonchi, symmetric air entry  Heart - normal rate, regular rhythm, normal S1, S2, no murmurs, rubs, clicks or gallops, no JVD  Abdomen - soft, nontender, nondistended, no masses or organomegaly  Neurological - alert, oriented, normal speech, no focal findings or movement disorder noted  Skin - normal coloration and turgor, no rashes, no suspicious skin lesions noted    Post neb- patient reports subjective improvement in ease of breathing. Assessment/ Plan:   Ricky Núñez was seen today for fatigue and shortness of breath.     Diagnoses and all orders for this visit:    COPD with acute exacerbation ([facility]  Has completed steroid burst  No wheezing today  Check CXR  Add atrovent  Increase activity as tolerated  -     albuterol (PROVENTIL VENTOLIN) 2.5 mg /3 mL (0.083 %) nebulizer solution; 3 mL by Nebulization route once for 1 dose. -     ALBUTEROL, INHAL. KZO.()  -     INHAL RX, AIRWAY OBST/DX SPUTUM INDUCT ([id number])  -     IPRATROPIUM BROMIDE NON-COMP  -     ipratropium (ATROVENT) 0.02 % nebulizer solution; 2.5 mL by Nebulization route as needed for Wheezing.  -     albuterol (PROVENTIL VENTOLIN) 2.5 mg /3 mL (0.083 %) nebulizer solution; 3 mL by Nebulization route every four (4) hours as needed for Wheezing.  -     Nebulizer & Compressor machine; Use as directed  -     XR CHEST PA LAT; Future  -     ipratropium (ATROVENT HFA) 17 mcg/actuation inhaler; Take 1 Puff by inhalation every six (6) hours as needed for Wheezing.  -     ipratropium (ATROVENT) 0.02 % nebulizer solution; 2.5 mL by Nebulization route as needed for Wheezing (every 6 hours as needed). Acute bronchitis, unspecified organism  -     XR CHEST PA LAT; Future       Follow-up Disposition:  Return in about 2 weeks (around 4/19/2017) for f/u COPD exacerbation. I have discussed the diagnosis with the patient and the intended plan as seen in the above orders. The patient has received an after-visit summary and questions were answered concerning future plans. The patient verbalizes understanding. Medication Side Effects and Warnings were discussed with patient: yes  Patient Labs were reviewed and or requested: yes  Patient Past Records were reviewed and or requested: yes    Patient Instructions        Bronchitis: Care Instructions  Your Care Instructions    Bronchitis is inflammation of the bronchial tubes, which carry air to the lungs. The tubes swell and produce mucus, or phlegm. The mucus and inflamed bronchial tubes make you cough. You may have trouble breathing. Most cases of bronchitis are caused by viruses like those that cause colds.  Antibiotics usually do not help and they may be harmful. Bronchitis usually develops rapidly and lasts about 2 to 3 weeks in otherwise healthy people. Follow-up care is a key part of your treatment and safety. Be sure to make and go to all appointments, and call your doctor if you are having problems. It's also a good idea to know your test results and keep a list of the medicines you take. How can you care for yourself at home? · Take all medicines exactly as prescribed. Call your doctor if you think you are having a problem with your medicine. · Get some extra rest.  · Take an over-the-counter pain medicine, such as acetaminophen (Tylenol), ibuprofen (Advil, Motrin), or naproxen (Aleve) to reduce fever and relieve body aches. Read and follow all instructions on the label. · Do not take two or more pain medicines at the same time unless the doctor told you to. Many pain medicines have acetaminophen, which is Tylenol. Too much acetaminophen (Tylenol) can be harmful. · Take an over-the-counter cough medicine that contains dextromethorphan to help quiet a dry, hacking cough so that you can sleep. Avoid cough medicines that have more than one active ingredient. Read and follow all instructions on the label. · Breathe moist air from a humidifier, hot shower, or sink filled with hot water. The heat and moisture will thin mucus so you can cough it out. · Do not smoke. Smoking can make bronchitis worse. If you need help quitting, talk to your doctor about stop-smoking programs and medicines. These can increase your chances of quitting for good. When should you call for help? Call 911 anytime you think you may need emergency care. For example, call if:  · You have severe trouble breathing. Call your doctor now or seek immediate medical care if:  · You have new or worse trouble breathing. · You cough up dark brown or bloody mucus (sputum). · You have a new or higher fever. · You have a new rash.   Watch closely for changes in your health, and be sure to contact your doctor if:  · You cough more deeply or more often, especially if you notice more mucus or a change in the color of your mucus. · You are not getting better as expected. Where can you learn more? Go to http://negrita-zuleyka.info/. Enter H333 in the search box to learn more about \"Bronchitis: Care Instructions. \"  Current as of: May 23, 2016  Content Version: 11.2  © 9494-0087 [employer]. Care instructions adapted under license by [employer] (which disclaims liability or warranty for this information). If you have questions about a medical condition or this instruction, always ask your healthcare professional. [address] any warranty or liability for your use of this information. Chronic Obstructive Pulmonary Disease (COPD) Flare-Ups: Care Instructions  Your Care Instructions    Chronic obstructive pulmonary disease (COPD) is a lung disease that makes it hard to breathe. It is caused by damage to the lungs over many years, usually from smoking. COPD is often a mix of two diseases:  · Chronic bronchitis: The airways that carry air to the lungs (bronchial tubes) get inflamed and make a lot of mucus. This can narrow or block the airways. · Emphysema: In a healthy person, the tiny air sacs in the lungs are like balloons. As you breathe in and out, they get bigger and smaller to move air through your lungs. But with emphysema, these air sacs are damaged and lose their stretch. Less air gets in and out of the lungs. Many people with COPD have attacks called flare-ups or exacerbations. This is when your usual symptoms quickly get worse and stay worse. The doctor has checked you carefully. But problems can develop later. If you notice any problems or new symptoms, get medical treatment right away. Follow-up care is a key part of your treatment and safety.  Be sure to make and go to all appointments, and call your doctor if you are having problems. It's also a good idea to know your test results and keep a list of the medicines you take. How can you care for yourself at home? · Be safe with medicines. Take your medicines exactly as prescribed. Call your doctor if you think you are having a problem with your medicine. You may be taking medicines such as:  ¨ Bronchodilators. These help open your airways and make breathing easier. ¨ Corticosteroids. These reduce airway inflammation. They may be given as pills, in a vein, or in an inhaled form. You may go home with pills in addition to an inhaler that you already use. · A spacer may help you get more inhaled medicine to your lungs. Ask your doctor or pharmacist if a spacer is right for you. If it is, ask how to use it properly. · If your doctor prescribed antibiotics, take them as directed. Do not stop taking them just because you feel better. You need to take the full course of antibiotics. · If your doctor prescribed oxygen, use the flow rate your doctor has recommended. Do not change it without talking to your doctor first.  · Do not smoke. Smoking makes COPD worse. If you need help quitting, talk to your doctor about stop-smoking programs and medicines. These can increase your chances of quitting for good. When should you call for help? Call 911 anytime you think you may need emergency care. For example, call if:  · You have severe trouble breathing. Call your doctor now or seek immediate medical care if:  · You have new or worse trouble breathing. · Your coughing or wheezing gets worse. · You cough up dark brown or bloody mucus (sputum). · You have a new or higher fever. Watch closely for changes in your health, and be sure to contact your doctor if:  · You notice more mucus or a change in the color of your mucus. · You need to use your antibiotic or steroid pills. · You do not get better as expected. Where can you learn more?   Go to http://negrita-zuleyka.info/. Enter O173 in the search box to learn more about \"Chronic Obstructive Pulmonary Disease (COPD) Flare-Ups: Care Instructions. \"  Current as of: July 21, 2016  Content Version: 11.2  © 6225-1699 [employer]. Care instructions adapted under license by [employer] (which disclaims liability or warranty for this information). If you have questions about a medical condition or this instruction, always ask your healthcare professional. [place] [address] any warranty or liability for your use of this information.           Fior Rehman [facility]

## 2017-04-17 ENCOUNTER — OFFICE VISIT (OUTPATIENT)
Dept: FAMILY MEDICINE CLINIC | Age: 67
End: 2017-04-17

## 2017-04-17 VITALS
HEIGHT: 64 IN | OXYGEN SATURATION: 95 % | TEMPERATURE: 98.1 F | HEART RATE: 78 BPM | SYSTOLIC BLOOD PRESSURE: 118 MMHG | DIASTOLIC BLOOD PRESSURE: 75 MMHG | WEIGHT: 220 LBS | BODY MASS INDEX: 37.56 KG/M2 | RESPIRATION RATE: 20 BRPM

## 2017-04-17 DIAGNOSIS — R53.83 FATIGUE, UNSPECIFIED TYPE: ICD-10-CM

## 2017-04-17 DIAGNOSIS — D50.0 IRON DEFICIENCY ANEMIA DUE TO CHRONIC BLOOD LOSS: ICD-10-CM

## 2017-04-17 DIAGNOSIS — F41.9 ANXIETY: ICD-10-CM

## 2017-04-17 DIAGNOSIS — J44.1 COPD WITH ACUTE EXACERBATION (HCC): Primary | ICD-10-CM

## 2017-04-17 RX ORDER — PAROXETINE HYDROCHLORIDE 20 MG/1
20 TABLET, FILM COATED ORAL DAILY
Qty: 30 TAB | Refills: 0 | Status: SHIPPED | OUTPATIENT
Start: 2017-04-17 | End: 2017-06-07 | Stop reason: SDUPTHER

## 2017-04-17 RX ORDER — PREDNISONE 20 MG/1
40 TABLET ORAL DAILY
Qty: 8 TAB | Refills: 0 | Status: SHIPPED | OUTPATIENT
Start: 2017-04-17 | End: 2017-04-21

## 2017-04-17 NOTE — PROGRESS NOTES
Chief Complaint   Patient presents with    Shortness of Breath     C/o lingering SOB & coughing      1. Have you been to the ER, urgent care clinic since your last visit? Hospitalized since your last visit? No    2. Have you seen or consulted any other health care providers outside of the [address] [place] [address] since your last visit? Include any pap smears or colon screening.  No

## 2017-04-17 NOTE — PROGRESS NOTES
Tung Prasad is a 61 y.o. female who presents with the following complaints:  Chief Complaint   Patient presents with    Shortness of Breath     C/o lingering SOB & coughing        Subjective:    HPI:   C/o lingering shortness of breath with exertion and dry cough, fatigue following her influenza-like illness last month. Reports symptoms are slowly improving overall. She has been using atrovent in the nebulizer twice a day, sometimes a 3rd treatment in the afternoon. Has not been using albuterol in the nebulizer. Has been using the albuterol inhaler throughout the day. No sputum production. No fever or chills. Reduced appetite. Reports good compliance with dulera and allergy meds. Had a more severe episode of shortness of breath at [facility] Friday evening, had to go back home.  wanted to take her to the ED, but her daughter-in-law who is an RN gave her a albuterol/atrovent neb, had good relief of symptoms. Has not had any further episodes of severe symptoms. She's concerned her anemia may be worsening and causing some of her symptoms. Pertinent PMH/FH/SH:  Past Medical History:   Diagnosis Date    Anemia     COPD (chronic obstructive pulmonary disease) (HCC)     GERD (gastroesophageal reflux disease)     Hypercholesterolemia     Hypertension     Obesity (BMI 30-39. 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  HX ENDOSCOPY  04/2014    HX GYN  1996    hysterectomy    HX OTHER SURGICAL  2006? L Lower Abd.  Basal Cell CA     Family History   Problem Relation Age of Onset    Hypertension Mother     Stroke Maternal Grandmother     Cancer Paternal Grandmother      breast cancer     Social History     Social History    Marital status:      Spouse name: N/A    Number of children: N/A    Years of education: N/A Social History Main Topics    Smoking status: Former Smoker     Packs/day: 1.00     Years: 18.00     Types: Cigarettes     Quit date: 7/24/1988    Smokeless tobacco: Never Used      Comment: Quit in 1988    Alcohol use Yes      Comment: rarely    Drug use: No    Sexual activity: Yes     Partners: Male     Birth control/ protection: Surgical      Comment: Hysterectomy     Other Topics Concern    None     Social History Narrative     Advanced Directives: N      Patient Active Problem List    Diagnosis    Iron deficiency anemia due to chronic blood loss    Vestibulopathy    Dizzy    Gait disturbance    Bradycardia    Obesity (BMI 30-39. 9)    COPD (chronic obstructive pulmonary disease) ([facility] [address]    Hypertension    Hypercholesterolemia    Vertigo    GERD (gastroesophageal reflux disease)       Nurse notes were reviewed and are correct  Review of Systems - negative except as listed above in the HPI    Objective:     Vitals:    04/17/17 1315   BP: 118/75   Pulse: 78   Resp: 20   Temp: 98.1 °F (36.7 °C)   TempSrc: Oral   SpO2: 95%   Weight: 220 lb (99.8 kg)   Height: 5' 4\" (1.626 m)     Physical Examination: General appearance - alert, well appearing, and in no distress and oriented to person, place, and time  Mental status - normal mood, behavior, speech, dress, motor activity, and thought processes  Neck - supple, no significant adenopathy  Chest - clear to auscultation, no wheezes, rales or rhonchi, symmetric air entry  Heart - normal rate, regular rhythm, normal S1, S2, no murmurs, rubs, clicks or gallops  Abdomen - soft, nontender, nondistended, no masses or organomegaly  Neurological - alert, oriented, normal speech, no focal findings or movement disorder noted  Extremities - no pedal edema noted  Skin - normal coloration and turgor    Assessment/ Plan:   Ni was seen today for shortness of breath.     Diagnoses and all orders for this visit:    COPD with acute exacerbation ([facility] [address]  Steroid burst  Albuterol/atrovent nebs 4 times a day for the next 2-3 days, then reduce if symptoms improved  Albuterol PRN  Make f/u appt with pulmonology  Continue dulera and allergy meds  -     predniSONE (DELTASONE) 20 mg tablet; Take 2 Tabs by mouth daily for 4 days. Anxiety  Doing well  Refill rx  -     PARoxetine (PAXIL) 20 mg tablet; Take 1 Tab by mouth daily. Iron deficiency anemia due to chronic blood loss  Fatigue, unspecified type  -     CBC WITH AUTOMATED DIFF       Follow-up Disposition:  Return in about 2 weeks (around 5/1/2017) for f/u COPD. I have discussed the diagnosis with the patient and the intended plan as seen in the above orders. The patient has received an after-visit summary and questions were answered concerning future plans. The patient verbalizes understanding. Medication Side Effects and Warnings were discussed with patient: yes  Patient Labs were reviewed and or requested: yes  Patient Past Records were reviewed and or requested: yes    Patient Instructions   ---albuterol + atrovent nebs four times a day for the next 2-3 days----  ---prednisone for 4 days----  ---make f/u appt with pulmonologist for further eval----         COPD Exacerbation Plan: Care Instructions  Your Care Instructions  If you have chronic obstructive pulmonary disease (COPD), your usual shortness of breath could suddenly get worse. You may start coughing more and have more mucus. This flare-up is called a COPD exacerbation (say \"hm-VLT-cl-BAY-[facility]\"). A lung infection or air pollution could set off an exacerbation. Sometimes it can happen after a quick change in temperature or being around chemicals. Work with your doctor to make a plan for dealing with an exacerbation. You can better manage it if you plan ahead. Follow-up care is a key part of your treatment and safety. Be sure to make and go to all appointments, and call your doctor if you are having problems.  It's also a good idea to know your test results and keep a list of the medicines you take. How can you care for yourself at home? During an exacerbation  · Do not panic if you start to have one. Quick treatment at home may help you prevent serious breathing problems. If you have a COPD exacerbation plan that you developed with your doctor, follow it. · Take your medicines exactly as your doctor tells you. ¨ Use your inhaler as directed by your doctor. If your symptoms do not get better after you use your medicine, have someone take you to the emergency room. Call an ambulance if necessary. ¨ With inhaled medicines, a spacer or a nebulizer may help you get more medicine to your lungs. Ask your doctor or pharmacist how to use them properly. Practice using the spacer in front of a mirror before you have an exacerbation. This may help you get the medicine into your lungs quickly. ¨ If your doctor has given you steroid pills, take them as directed. ¨ Your doctor may have given you a prescription for antibiotics, which you can fill if you need it. ¨ Talk to your doctor if you have any problems with your medicine. And call your doctor if you have to use your antibiotic or steroid pills. Preventing an exacerbation  · Do not smoke. This is the most important step you can take to prevent more damage to your lungs and prevent problems. If you already smoke, it is never too late to stop. If you need help quitting, talk to your doctor about stop-smoking programs and medicines. These can increase your chances of quitting for good. · Take your daily medicines as prescribed. · Avoid colds and flu. ¨ Get a pneumococcal vaccine. ¨ Get a flu vaccine each year, as soon as it is available. Ask those you live or work with to do the same, so they will not get the flu and infect you. ¨ Try to stay away from people with colds or the flu. ¨ Wash your hands often. · Avoid secondhand smoke; air pollution; cold, dry air; hot, humid air; and high altitudes.  Stay at home with your windows closed when air pollution is bad. · Learn breathing techniques for COPD, such as breathing through pursed lips. These techniques can help you breathe easier during an exacerbation. When should you call for help? Call 911 anytime you think you may need emergency care. For example, call if:  · You have severe trouble breathing. · You have severe chest pain. Call your doctor now or seek immediate medical care if:  · You have new or worse shortness of breath. · You develop new chest pain. · You are coughing more deeply or more often, especially if you notice more mucus or a change in the color of your mucus. · You cough up blood. · You have new or increased swelling in your legs or belly. · You have a fever. Watch closely for changes in your health, and be sure to contact your doctor if:  · You need to use your antibiotic or steroid pills. · Your symptoms are getting worse. Where can you learn more? Go to http://negrita-zuleyka.info/. Enter B572 in the search box to learn more about \"COPD Exacerbation Plan: Care Instructions. \"  Current as of: July 21, 2016  Content Version: 11.2  © 3430-3987 [employer]. Care instructions adapted under license by [employer] (which disclaims liability or warranty for this information). If you have questions about a medical condition or this instruction, always ask your healthcare professional. [address] any warranty or liability for your use of this information. Chronic Obstructive Pulmonary Disease (COPD) Flare-Ups: Care Instructions  Your Care Instructions    Chronic obstructive pulmonary disease (COPD) is a lung disease that makes it hard to breathe. It is caused by damage to the lungs over many years, usually from smoking.   COPD is often a mix of two diseases:  · Chronic bronchitis: The airways that carry air to the lungs (bronchial tubes) get inflamed and make a lot of mucus. This can narrow or block the airways. · Emphysema: In a healthy person, the tiny air sacs in the lungs are like balloons. As you breathe in and out, they get bigger and smaller to move air through your lungs. But with emphysema, these air sacs are damaged and lose their stretch. Less air gets in and out of the lungs. Many people with COPD have attacks called flare-ups or exacerbations. This is when your usual symptoms quickly get worse and stay worse. The doctor has checked you carefully. But problems can develop later. If you notice any problems or new symptoms, get medical treatment right away. Follow-up care is a key part of your treatment and safety. Be sure to make and go to all appointments, and call your doctor if you are having problems. It's also a good idea to know your test results and keep a list of the medicines you take. How can you care for yourself at home? · Be safe with medicines. Take your medicines exactly as prescribed. Call your doctor if you think you are having a problem with your medicine. You may be taking medicines such as:  ¨ Bronchodilators. These help open your airways and make breathing easier. ¨ Corticosteroids. These reduce airway inflammation. They may be given as pills, in a vein, or in an inhaled form. You may go home with pills in addition to an inhaler that you already use. · A spacer may help you get more inhaled medicine to your lungs. Ask your doctor or pharmacist if a spacer is right for you. If it is, ask how to use it properly. · If your doctor prescribed antibiotics, take them as directed. Do not stop taking them just because you feel better. You need to take the full course of antibiotics. · If your doctor prescribed oxygen, use the flow rate your doctor has recommended. Do not change it without talking to your doctor first.  · Do not smoke. Smoking makes COPD worse.  If you need help quitting, talk to your doctor about stop-smoking programs and medicines. These can increase your chances of quitting for good. When should you call for help? Call 911 anytime you think you may need emergency care. For example, call if:  · You have severe trouble breathing. Call your doctor now or seek immediate medical care if:  · You have new or worse trouble breathing. · Your coughing or wheezing gets worse. · You cough up dark brown or bloody mucus (sputum). · You have a new or higher fever. Watch closely for changes in your health, and be sure to contact your doctor if:  · You notice more mucus or a change in the color of your mucus. · You need to use your antibiotic or steroid pills. · You do not get better as expected. Where can you learn more? Go to http://negrita-zuleyka.info/. Enter L314 in the search box to learn more about \"Chronic Obstructive Pulmonary Disease (COPD) Flare-Ups: Care Instructions. \"  Current as of: July 21, 2016  Content Version: 11.2  © 8529-5191 [employer]. Care instructions adapted under license by [employer] (which disclaims liability or warranty for this information). If you have questions about a medical condition or this instruction, always ask your healthcare professional. [place] [address] any warranty or liability for your use of this information.           Yuli LISA

## 2017-04-17 NOTE — PATIENT INSTRUCTIONS
---albuterol + atrovent nebs four times a day for the next 2-3 days----  ---prednisone for 4 days----  ---make f/u appt with pulmonologist for further eval----         COPD Exacerbation Plan: Care Instructions  Your Care Instructions  If you have chronic obstructive pulmonary disease (COPD), your usual shortness of breath could suddenly get worse. You may start coughing more and have more mucus. This flare-up is called a COPD exacerbation (say \"it-UKK-la-BAY-shun\"). A lung infection or air pollution could set off an exacerbation. Sometimes it can happen after a quick change in temperature or being around chemicals. Work with your doctor to make a plan for dealing with an exacerbation. You can better manage it if you plan ahead. Follow-up care is a key part of your treatment and safety. Be sure to make and go to all appointments, and call your doctor if you are having problems. It's also a good idea to know your test results and keep a list of the medicines you take. How can you care for yourself at home? During an exacerbation  · Do not panic if you start to have one. Quick treatment at home may help you prevent serious breathing problems. If you have a COPD exacerbation plan that you developed with your doctor, follow it. · Take your medicines exactly as your doctor tells you. ¨ Use your inhaler as directed by your doctor. If your symptoms do not get better after you use your medicine, have someone take you to the emergency room. Call an ambulance if necessary. ¨ With inhaled medicines, a spacer or a nebulizer may help you get more medicine to your lungs. Ask your doctor or pharmacist how to use them properly. Practice using the spacer in front of a mirror before you have an exacerbation. This may help you get the medicine into your lungs quickly. ¨ If your doctor has given you steroid pills, take them as directed.   ¨ Your doctor may have given you a prescription for antibiotics, which you can fill if you need it. ¨ Talk to your doctor if you have any problems with your medicine. And call your doctor if you have to use your antibiotic or steroid pills. Preventing an exacerbation  · Do not smoke. This is the most important step you can take to prevent more damage to your lungs and prevent problems. If you already smoke, it is never too late to stop. If you need help quitting, talk to your doctor about stop-smoking programs and medicines. These can increase your chances of quitting for good. · Take your daily medicines as prescribed. · Avoid colds and flu. ¨ Get a pneumococcal vaccine. ¨ Get a flu vaccine each year, as soon as it is available. Ask those you live or work with to do the same, so they will not get the flu and infect you. ¨ Try to stay away from people with colds or the flu. ¨ Wash your hands often. · Avoid secondhand smoke; air pollution; cold, dry air; hot, humid air; and high altitudes. Stay at home with your windows closed when air pollution is bad. · Learn breathing techniques for COPD, such as breathing through pursed lips. These techniques can help you breathe easier during an exacerbation. When should you call for help? Call 911 anytime you think you may need emergency care. For example, call if:  · You have severe trouble breathing. · You have severe chest pain. Call your doctor now or seek immediate medical care if:  · You have new or worse shortness of breath. · You develop new chest pain. · You are coughing more deeply or more often, especially if you notice more mucus or a change in the color of your mucus. · You cough up blood. · You have new or increased swelling in your legs or belly. · You have a fever. Watch closely for changes in your health, and be sure to contact your doctor if:  · You need to use your antibiotic or steroid pills. · Your symptoms are getting worse. Where can you learn more? Go to http://negrita-zuleyka.info/.   Enter E866 in the search box to learn more about \"COPD Exacerbation Plan: Care Instructions. \"  Current as of: July 21, 2016  Content Version: 11.2  © 7516-1538 [employer]. Care instructions adapted under license by [employer] (which disclaims liability or warranty for this information). If you have questions about a medical condition or this instruction, always ask your healthcare professional. [address] any warranty or liability for your use of this information. Chronic Obstructive Pulmonary Disease (COPD) Flare-Ups: Care Instructions  Your Care Instructions    Chronic obstructive pulmonary disease (COPD) is a lung disease that makes it hard to breathe. It is caused by damage to the lungs over many years, usually from smoking. COPD is often a mix of two diseases:  · Chronic bronchitis: The airways that carry air to the lungs (bronchial tubes) get inflamed and make a lot of mucus. This can narrow or block the airways. · Emphysema: In a healthy person, the tiny air sacs in the lungs are like balloons. As you breathe in and out, they get bigger and smaller to move air through your lungs. But with emphysema, these air sacs are damaged and lose their stretch. Less air gets in and out of the lungs. Many people with COPD have attacks called flare-ups or exacerbations. This is when your usual symptoms quickly get worse and stay worse. The doctor has checked you carefully. But problems can develop later. If you notice any problems or new symptoms, get medical treatment right away. Follow-up care is a key part of your treatment and safety. Be sure to make and go to all appointments, and call your doctor if you are having problems. It's also a good idea to know your test results and keep a list of the medicines you take. How can you care for yourself at home? · Be safe with medicines. Take your medicines exactly as prescribed.  Call your doctor if you think you are having a problem with your medicine. You may be taking medicines such as:  ¨ Bronchodilators. These help open your airways and make breathing easier. ¨ Corticosteroids. These reduce airway inflammation. They may be given as pills, in a vein, or in an inhaled form. You may go home with pills in addition to an inhaler that you already use. · A spacer may help you get more inhaled medicine to your lungs. Ask your doctor or pharmacist if a spacer is right for you. If it is, ask how to use it properly. · If your doctor prescribed antibiotics, take them as directed. Do not stop taking them just because you feel better. You need to take the full course of antibiotics. · If your doctor prescribed oxygen, use the flow rate your doctor has recommended. Do not change it without talking to your doctor first.  · Do not smoke. Smoking makes COPD worse. If you need help quitting, talk to your doctor about stop-smoking programs and medicines. These can increase your chances of quitting for good. When should you call for help? Call 911 anytime you think you may need emergency care. For example, call if:  · You have severe trouble breathing. Call your doctor now or seek immediate medical care if:  · You have new or worse trouble breathing. · Your coughing or wheezing gets worse. · You cough up dark brown or bloody mucus (sputum). · You have a new or higher fever. Watch closely for changes in your health, and be sure to contact your doctor if:  · You notice more mucus or a change in the color of your mucus. · You need to use your antibiotic or steroid pills. · You do not get better as expected. Where can you learn more? Go to http://negrita-zuleyka.info/. Enter B136 in the search box to learn more about \"Chronic Obstructive Pulmonary Disease (COPD) Flare-Ups: Care Instructions. \"  Current as of: July 21, 2016  Content Version: 11.2  © 0340-4202 [employer].  Care instructions adapted under license by [employer] (which disclaims liability or warranty for this information). If you have questions about a medical condition or this instruction, always ask your healthcare professional. [address] any warranty or liability for your use of this information.

## 2017-04-17 NOTE — MR AVS SNAPSHOT
Visit Information Date & Time Provider Department Dept. Phone Encounter #  
 4/17/2017  1:00 PM Tanya Peña  [facility] [phone] [id number] Follow-up Instructions Return in about 2 weeks (around 5/1/2017) for f/u COPD. Your Appointments 7/21/2017  9:30 AM  
ESTABLISHED PATIENT with Gissel Fletcher MD  
[facility] [address]) Appt Note: Jorge Alberto, iron deficiency fu  
 [address], [address] [facility] [address]  
[phone]  
  
   
 [address], [address] Upcoming Health Maintenance Date Due Hepatitis C Screening 1950 Pneumococcal 19-64 Medium Risk (1 of 1 - PPSV23) 4/21/1971 DTaP/Tdap/Td series (1 - Tdap) 4/21/1973 PAP AKA CERVICAL CYTOLOGY 4/21/1973 ZOSTER VACCINE AGE 60> 4/21/2012 BREAST CANCER SCRN MAMMOGRAM 4/14/2016 INFLUENZA AGE 9 TO ADULT 8/1/2016 COLONOSCOPY 4/9/2024 Allergies as of 4/17/2017  Review Complete On: 4/17/2017 By: [facility] Severity Noted Reaction Type Reactions Loratadine  12/08/2011    Nausea Only, Other (comments) Muscle weakness Current Immunizations  Reviewed on 7/29/2016 Name Date Hib (PRP-T) 6/30/2015 Influenza Vaccine 12/17/2015, 10/22/2015 Influenza Vaccine Split 10/26/2012, 10/7/2011 Not reviewed this visit You Were Diagnosed With   
  
 Codes Comments COPD with acute exacerbation ([facility]    -  Primary ICD-10-CM: J44.1 ICD-9-CM: 491.21 Anxiety     ICD-10-CM: F41.9 ICD-9-CM: 300.00 Vitals BP Pulse Temp Resp Height(growth percentile) Weight(growth percentile) 118/75 (BP 1 Location: Right arm, BP Patient Position: Sitting) 78 98.1 °F (36.7 °C) (Oral) 20 5' 4\" (1.626 m) 220 lb (99.8 kg) LMP SpO2 BMI OB Status Smoking Status 01/01/1996 95% 37.76 kg/m2 Hysterectomy Former Smoker Vitals History BMI and BSA Data Body Mass Index Body Surface Area  
 37.76 kg/m 2 2.12 m 2 Preferred Pharmacy Pharmacy Name Phone [facility] [address] [facility] [address] [place] [address] [phone] Your Updated Medication List  
  
   
This list is accurate as of: 4/17/17  1:38 PM.  Always use your most recent med list.  
  
  
  
  
 * albuterol 90 mcg/actuation inhaler Commonly known as:  VENTOLIN HFA Take 2 Puffs by inhalation every six (6) hours as needed for Wheezing or Shortness of Breath (may also use for cough). * albuterol 2.5 mg /3 mL (0.083 %) nebulizer solution Commonly known as:  PROVENTIL VENTOLIN  
3 mL by Nebulization route every four (4) hours as needed for Wheezing. ALPRAZolam 0.5 mg tablet Commonly known as:  Nat [place] Take 1 Tab by mouth two (2) times daily as needed for Anxiety. Max Daily Amount: 1 mg.  
  
 atorvastatin 10 mg tablet Commonly known as:  LIPITOR Take 1 Tab by mouth daily. hydrocortisone 2.5 % topical cream  
Commonly known as:  HYTONE Apply  to affected area two (2) times a day. use thin layer  
  
 ibuprofen 800 mg tablet Commonly known as:  MOTRIN Take 1 Tab by mouth every eight (8) hours as needed for Pain. * ipratropium 17 mcg/actuation inhaler Commonly known as:  ATROVENT HFA Take 1 Puff by inhalation every six (6) hours as needed for Wheezing. * ipratropium 0.02 % nebulizer solution Commonly known as:  ATROVENT  
2.5 mL by Nebulization route as needed for Wheezing (every 6 hours as needed). lansoprazole 30 mg capsule Commonly known as:  PREVACID TAKE 1 CAPSULE DAILY BEFORE BREAKFAST  
  
 levocetirizine 5 mg tablet Commonly known as:  Alessandra Riding Take 1 Tab by mouth daily. losartan 25 mg tablet Commonly known as:  COZAAR Take 1 Tab by mouth daily. mometasone-formoterol 200-5 mcg/actuation HFA inhaler Commonly known as:  Michelle Albright Take 2 Puffs by inhalation two (2) times a day. montelukast 10 mg tablet Commonly known as:  SINGULAIR  
TAKE 1 TABLET DAILY Nebulizer & Compressor machine Use as directed PARoxetine 20 mg tablet Commonly known as:  PAXIL Take 1 Tab by mouth daily. potassium chloride 20 mEq tablet Commonly known as:  K-DUR, KLOR-CON Take 1 Tab by mouth three (3) times daily. predniSONE 20 mg tablet Commonly known as:  Earlie Reveal Take 2 Tabs by mouth daily for 4 days. sodium bicarb-sodium chloride Kit Commonly known as:  NEILMED SINUS RINSE COMPLETE  
1 Packet by Nasal route daily. TOPROL XL 25 mg XL tablet Generic drug:  metoprolol succinate  
  
 triamterene-hydroCHLOROthiazide 37.5-25 mg per tablet Commonly known as:  Lavona Bill Take 1 Tab by mouth daily. * Notice: This list has 4 medication(s) that are the same as other medications prescribed for you. Read the directions carefully, and ask your doctor or other care provider to review them with you. Prescriptions Sent to Pharmacy Refills PARoxetine (PAXIL) 20 mg tablet 0 Sig: Take 1 Tab by mouth daily. Class: Normal  
 Pharmacy: [facility] [address] [place] [address] [address] [place] [address] E Ph #: [phone] Route: Oral  
 predniSONE (DELTASONE) 20 mg tablet 0 Sig: Take 2 Tabs by mouth daily for 4 days. Class: Normal  
 Pharmacy: [facility] [address] [place], [place] [address]y [address] [place] [address] E Ph #: [phone] Route: Oral  
  
Follow-up Instructions Return in about 2 weeks (around 5/1/2017) for f/u COPD. Patient Instructions ---albuterol + atrovent nebs four times a day for the next 2-3 days---- 
---prednisone for 4 days---- 
---make f/u appt with pulmonologist for further eval---- 
 
 
  
COPD Exacerbation Plan: Care Instructions Your Care Instructions If you have chronic obstructive pulmonary disease (COPD), your usual shortness of breath could suddenly get worse. You may start coughing more and have more mucus. This flare-up is called a COPD exacerbation (say \"mi-PBD-vp-BAY-shun\"). A lung infection or air pollution could set off an exacerbation. Sometimes it can happen after a quick change in temperature or being around chemicals. Work with your doctor to make a plan for dealing with an exacerbation. You can better manage it if you plan ahead. Follow-up care is a key part of your treatment and safety. Be sure to make and go to all appointments, and call your doctor if you are having problems. It's also a good idea to know your test results and keep a list of the medicines you take. How can you care for yourself at home? During an exacerbation · Do not panic if you start to have one. Quick treatment at home may help you prevent serious breathing problems. If you have a COPD exacerbation plan that you developed with your doctor, follow it. · Take your medicines exactly as your doctor tells you. ¨ Use your inhaler as directed by your doctor. If your symptoms do not get better after you use your medicine, have someone take you to the emergency room. Call an ambulance if necessary. ¨ With inhaled medicines, a spacer or a nebulizer may help you get more medicine to your lungs. Ask your doctor or pharmacist how to use them properly. Practice using the spacer in front of a mirror before you have an exacerbation. This may help you get the medicine into your lungs quickly. ¨ If your doctor has given you steroid pills, take them as directed. ¨ Your doctor may have given you a prescription for antibiotics, which you can fill if you need it. ¨ Talk to your doctor if you have any problems with your medicine. And call your doctor if you have to use your antibiotic or steroid pills. Preventing an exacerbation · Do not smoke. This is the most important step you can take to prevent more damage to your lungs and prevent problems. If you already smoke, it is never too late to stop. If you need help quitting, talk to your doctor about stop-smoking programs and medicines. These can increase your chances of quitting for good. · Take your daily medicines as prescribed. · Avoid colds and flu. ¨ Get a pneumococcal vaccine. ¨ Get a flu vaccine each year, as soon as it is available. Ask those you live or work with to do the same, so they will not get the flu and infect you. ¨ Try to stay away from people with colds or the flu. ¨ Wash your hands often. · Avoid secondhand smoke; air pollution; cold, dry air; hot, humid air; and high altitudes. Stay at home with your windows closed when air pollution is bad. · Learn breathing techniques for COPD, such as breathing through pursed lips. These techniques can help you breathe easier during an exacerbation. When should you call for help? Call 911 anytime you think you may need emergency care. For example, call if: 
· You have severe trouble breathing. · You have severe chest pain. Call your doctor now or seek immediate medical care if: 
· You have new or worse shortness of breath. · You develop new chest pain. · You are coughing more deeply or more often, especially if you notice more mucus or a change in the color of your mucus. · You cough up blood. · You have new or increased swelling in your legs or belly. · You have a fever. Watch closely for changes in your health, and be sure to contact your doctor if: 
· You need to use your antibiotic or steroid pills. · Your symptoms are getting worse. Where can you learn more? Go to http://negrita-zuleyka.info/. Enter O769 in the search box to learn more about \"COPD Exacerbation Plan: Care Instructions. \" Current as of: July 21, 2016 Content Version: 11.2 © 4934-7545 [employer]. Care instructions adapted under license by [employer] (which disclaims liability or warranty for this information). If you have questions about a medical condition or this instruction, always ask your healthcare professional. [address] any warranty or liability for your use of this information. Chronic Obstructive Pulmonary Disease (COPD) Flare-Ups: Care Instructions Your Care Instructions Chronic obstructive pulmonary disease (COPD) is a lung disease that makes it hard to breathe. It is caused by damage to the lungs over many years, usually from smoking. COPD is often a mix of two diseases: · Chronic bronchitis: The airways that carry air to the lungs (bronchial tubes) get inflamed and make a lot of mucus. This can narrow or block the airways. · Emphysema: In a healthy person, the tiny air sacs in the lungs are like balloons. As you breathe in and out, they get bigger and smaller to move air through your lungs. But with emphysema, these air sacs are damaged and lose their stretch. Less air gets in and out of the lungs. Many people with COPD have attacks called flare-ups or exacerbations. This is when your usual symptoms quickly get worse and stay worse. The doctor has checked you carefully. But problems can develop later. If you notice any problems or new symptoms, get medical treatment right away. Follow-up care is a key part of your treatment and safety. Be sure to make and go to all appointments, and call your doctor if you are having problems. It's also a good idea to know your test results and keep a list of the medicines you take. How can you care for yourself at home? · Be safe with medicines. Take your medicines exactly as prescribed. Call your doctor if you think you are having a problem with your medicine. You may be taking medicines such as: ¨ Bronchodilators. These help open your airways and make breathing easier. ¨ Corticosteroids. These reduce airway inflammation. They may be given as pills, in a vein, or in an inhaled form. You may go home with pills in addition to an inhaler that you already use. · A spacer may help you get more inhaled medicine to your lungs. Ask your doctor or pharmacist if a spacer is right for you. If it is, ask how to use it properly. · If your doctor prescribed antibiotics, take them as directed. Do not stop taking them just because you feel better. You need to take the full course of antibiotics. · If your doctor prescribed oxygen, use the flow rate your doctor has recommended. Do not change it without talking to your doctor first. 
· Do not smoke. Smoking makes COPD worse. If you need help quitting, talk to your doctor about stop-smoking programs and medicines. These can increase your chances of quitting for good. When should you call for help? Call 911 anytime you think you may need emergency care. For example, call if: 
· You have severe trouble breathing. Call your doctor now or seek immediate medical care if: 
· You have new or worse trouble breathing. · Your coughing or wheezing gets worse. · You cough up dark brown or bloody mucus (sputum). · You have a new or higher fever. Watch closely for changes in your health, and be sure to contact your doctor if: 
· You notice more mucus or a change in the color of your mucus. · You need to use your antibiotic or steroid pills. · You do not get better as expected. Where can you learn more? Go to http://negrita-zuleyka.info/. Enter L222 in the search box to learn more about \"Chronic Obstructive Pulmonary Disease (COPD) Flare-Ups: Care Instructions. \" Current as of: July 21, 2016 Content Version: 11.2 © 0622-6706 [employer].  Care instructions adapted under license by [employer] (which disclaims liability or warranty for this information). If you have questions about a medical condition or this instruction, always ask your healthcare professional. [address] any warranty or liability for your use of this information. Introducing [facility]! Gwen Kolb introduces [employer] patient portal. Now you can access parts of your medical record, email your doctor's office, and request medication refills online. 1. In your internet browser, go to [email or web address]"[email or web address]. Covacsis/"[employer] 2. Click on the First Time User? Click Here link in the Sign In box. You will see the New Member Sign Up page. 3. Enter your [employer] Access Code exactly as it appears below. You will not need to use this code after youve completed the sign-up process. If you do not sign up before the expiration date, you must request a new code. · [employer] Access Code: [email or web address] Expires: 6/11/2017 10:57 AM 
 
4. Enter the last four digits of your Social Security Number (xxxx) and Date of Birth (mm/dd/yyyy) as indicated and click Submit. You will be taken to the next sign-up page. 5. Create a [employer] ID. This will be your [employer] login ID and cannot be changed, so think of one that is secure and easy to remember. 6. Create a [employer] password. You can change your password at any time. 7. Enter your Password Reset Question and Answer. This can be used at a later time if you forget your password. 8. Enter your e-mail address. You will receive e-mail notification when new information is available in [address] 9. Click Sign Up. You can now view and download portions of your medical record. 10. Click the Download Summary menu link to download a portable copy of your medical information. If you have questions, please visit the Frequently Asked Questions section of the [employer] website. Remember, [employer] is NOT to be used for urgent needs. For medical emergencies, dial 911. Now available from your iPhone and Android! Please provide this summary of care documentation to your next provider. Your primary care clinician is listed as Kaden Rivers. If you have any questions after today's visit, please call [phone].

## 2017-04-18 LAB
BASOPHILS # BLD AUTO: 0.1 X10E3/UL (ref 0–0.2)
BASOPHILS NFR BLD AUTO: 1 %
EOSINOPHIL # BLD AUTO: 0.9 X10E3/UL (ref 0–0.4)
EOSINOPHIL NFR BLD AUTO: 10 %
ERYTHROCYTE [DISTWIDTH] IN BLOOD BY AUTOMATED COUNT: 15.1 % (ref 12.3–15.4)
HCT VFR BLD AUTO: 41.6 % (ref 34–46.6)
HGB BLD-MCNC: 13.8 G/DL (ref 11.1–15.9)
IMM GRANULOCYTES # BLD: 0 X10E3/UL (ref 0–0.1)
IMM GRANULOCYTES NFR BLD: 0 %
LYMPHOCYTES # BLD AUTO: 1.9 X10E3/UL (ref 0.7–3.1)
LYMPHOCYTES NFR BLD AUTO: 21 %
MCH RBC QN AUTO: 26.6 PG (ref 26.6–33)
MCHC RBC AUTO-ENTMCNC: 33.2 G/DL (ref 31.5–35.7)
MCV RBC AUTO: 80 FL (ref 79–97)
MONOCYTES # BLD AUTO: 0.9 X10E3/UL (ref 0.1–0.9)
MONOCYTES NFR BLD AUTO: 9 %
NEUTROPHILS # BLD AUTO: 5.4 X10E3/UL (ref 1.4–7)
NEUTROPHILS NFR BLD AUTO: 59 %
PLATELET # BLD AUTO: 253 X10E3/UL (ref 150–379)
RBC # BLD AUTO: 5.19 X10E6/UL (ref 3.77–5.28)
WBC # BLD AUTO: 9.2 X10E3/UL (ref 3.4–10.8)

## 2017-04-28 RX ORDER — MOMETASONE FUROATE AND FORMOTEROL FUMARATE DIHYDRATE 200; 5 UG/1; UG/1
AEROSOL RESPIRATORY (INHALATION)
Qty: 3 INHALER | Refills: 2 | Status: SHIPPED | OUTPATIENT
Start: 2017-04-28 | End: 2018-01-28 | Stop reason: SDUPTHER

## 2017-05-03 RX ORDER — METOPROLOL SUCCINATE 25 MG/1
TABLET, EXTENDED RELEASE ORAL
Qty: 90 TAB | Refills: 1 | Status: SHIPPED | OUTPATIENT
Start: 2017-05-03 | End: 2017-10-30 | Stop reason: SDUPTHER

## 2017-05-31 ENCOUNTER — TELEPHONE (OUTPATIENT)
Dept: FAMILY MEDICINE CLINIC | Age: 67
End: 2017-05-31

## 2017-05-31 DIAGNOSIS — J45.901 ASTHMA WITH ACUTE EXACERBATION, UNSPECIFIED ASTHMA SEVERITY: Primary | ICD-10-CM

## 2017-05-31 NOTE — TELEPHONE ENCOUNTER
[employer], Rasheeda, called and asked if a referral could be put in the system for the patient; the patient went to see Dr. Pastor Arreaga, pulmonary doctor on 4-; the diagnosis code was J45.901 - unspecified asthma with exacerbation; I can get the referral authorized and sent to the specialist for payment once the referral is in the system; thank you very much

## 2017-06-07 DIAGNOSIS — F41.9 ANXIETY: ICD-10-CM

## 2017-06-08 RX ORDER — PAROXETINE HYDROCHLORIDE 20 MG/1
TABLET, FILM COATED ORAL
Qty: 30 TAB | Refills: 0 | Status: SHIPPED | OUTPATIENT
Start: 2017-06-08 | End: 2017-08-04 | Stop reason: SDUPTHER

## 2017-06-14 ENCOUNTER — HOSPITAL ENCOUNTER (OUTPATIENT)
Dept: GENERAL RADIOLOGY | Age: 67
Discharge: HOME | End: 2017-06-14
Payer: COMMERCIAL

## 2017-06-14 DIAGNOSIS — R05.9 COUGH: ICD-10-CM

## 2017-06-14 PROCEDURE — 71020 XR CHEST PA LAT: CPT

## 2017-07-10 ENCOUNTER — TELEPHONE (OUTPATIENT)
Dept: ONCOLOGY | Age: 67
End: 2017-07-10

## 2017-07-18 LAB
ERYTHROCYTE [DISTWIDTH] IN BLOOD BY AUTOMATED COUNT: 16.2 % (ref 12.3–15.4)
FERRITIN SERPL-MCNC: 12 NG/ML (ref 15–150)
HCT VFR BLD AUTO: 38.5 % (ref 34–46.6)
HGB BLD-MCNC: 12.3 G/DL (ref 11.1–15.9)
IRON SATN MFR SERPL: 11 % (ref 15–55)
IRON SERPL-MCNC: 41 UG/DL (ref 27–139)
MCH RBC QN AUTO: 25.8 PG (ref 26.6–33)
MCHC RBC AUTO-ENTMCNC: 31.9 G/DL (ref 31.5–35.7)
MCV RBC AUTO: 81 FL (ref 79–97)
PLATELET # BLD AUTO: 244 X10E3/UL (ref 150–379)
RBC # BLD AUTO: 4.76 X10E6/UL (ref 3.77–5.28)
TIBC SERPL-MCNC: 372 UG/DL (ref 250–450)
UIBC SERPL-MCNC: 331 UG/DL (ref 118–369)
WBC # BLD AUTO: 6.9 X10E3/UL (ref 3.4–10.8)

## 2017-07-21 ENCOUNTER — TELEPHONE (OUTPATIENT)
Dept: FAMILY MEDICINE CLINIC | Age: 67
End: 2017-07-21

## 2017-07-21 ENCOUNTER — OFFICE VISIT (OUTPATIENT)
Dept: ONCOLOGY | Age: 67
End: 2017-07-21

## 2017-07-21 VITALS
DIASTOLIC BLOOD PRESSURE: 78 MMHG | HEIGHT: 64 IN | HEART RATE: 75 BPM | BODY MASS INDEX: 38.41 KG/M2 | RESPIRATION RATE: 20 BRPM | TEMPERATURE: 95.9 F | OXYGEN SATURATION: 98 % | WEIGHT: 225 LBS | SYSTOLIC BLOOD PRESSURE: 125 MMHG

## 2017-07-21 DIAGNOSIS — D50.0 IRON DEFICIENCY ANEMIA DUE TO CHRONIC BLOOD LOSS: Primary | ICD-10-CM

## 2017-07-21 DIAGNOSIS — D50.9 IRON DEFICIENCY ANEMIA, UNSPECIFIED IRON DEFICIENCY ANEMIA TYPE: Primary | ICD-10-CM

## 2017-07-21 NOTE — PROGRESS NOTES
[address] [facility]  [phone]    Hematology / Oncology Followup    Reason for Visit:   Michelle Palmer is a 74 y.o. female who is seen for follow up of iron deficiency anemia. History of Present Illness:   She continues with chronic fatigue. No bleeding recently, though she did notice a change in her stool color about 3 months ago, has not recurred. No melena. No bleeding elsewhere. PAST HISTORY: The following sections were reviewed and updated in the EMR as appropriate: PMH, SH, FH, Medications, Allergies. She is unaccompanied today. She works part time at an office. She lives in [place] with her , grown son, and her grandson is with them part-time. Allergies   Allergen Reactions    Loratadine Nausea Only and Other (comments)     Muscle weakness      Review of Systems: A complete review of systems was obtained, reviewed, and scanned into the EMR. Pertinent findings reviewed above. Physical Exam:     Visit Vitals    /78 (BP 1 Location: Right arm, BP Patient Position: Sitting)  Comment: .  Pulse 75    Temp 95.9 °F (35.5 °C) (Temporal)    Resp 20    Ht 5' 4\" (1.626 m)    Wt 225 lb (102.1 kg)    LMP 01/01/1996    SpO2 98%    BMI 38.62 kg/m2     General: No distress  Eyes: PERRLA, anicteric sclerae  HENT: Atraumatic, OP clear  Neck: Supple  Lymphatic: No cervical, supraclavicular, or inguinal adenopathy  Respiratory: CTAB, normal respiratory effort  CV: Normal rate, regular rhythm, no murmurs, no peripheral edema  GI: Soft, nontender, nondistended, no masses, no hepatomegaly, no splenomegaly  MS: Normal gait and station. Digits without clubbing or cyanosis. Skin: No rashes, ecchymoses, or petechiae. Normal temperature, turgor, and texture.   Psych: Alert, oriented, appropriate affect, normal judgment/insight    Results:     Lab Results   Component Value Date/Time    WBC 6.9 07/17/2017 11:41 AM    HGB 12.3 07/17/2017 11:41 AM    HCT 38.5 07/17/2017 11:41 AM    PLATELET 408 00/04/3452 11:41 AM    MCV 81 07/17/2017 11:41 AM    ABS. NEUTROPHILS 5.4 04/17/2017 01:49 PM    Hemoglobin (POC) 8.8 02/20/2014 11:34 AM    Hemoglobin (POC) 10.2 10/15/2011 03:12 PM    Hematocrit (POC) 30 10/15/2011 03:12 PM     Lab Results   Component Value Date/Time    Sodium 140 07/27/2016 01:03 AM    Potassium 3.8 07/27/2016 01:03 AM    Chloride 106 07/27/2016 01:03 AM    CO2 28 07/27/2016 01:03 AM    Glucose 116 07/27/2016 01:03 AM    BUN 7 07/27/2016 01:03 AM    Creatinine 0.80 07/27/2016 01:03 AM    GFR est AA >60 07/27/2016 01:03 AM    GFR est non-AA >60 07/27/2016 01:03 AM    Calcium 8.6 07/27/2016 01:03 AM    Sodium (POC) 141 10/15/2011 03:12 PM    Potassium (POC) 3.5 10/15/2011 03:12 PM    Chloride (POC) 104 10/15/2011 03:12 PM    Glucose (POC) 83 07/26/2016 12:03 PM    Glucose POC 90 12/05/2016 11:08 AM    BUN (POC) 10 10/15/2011 03:12 PM    Creatinine (POC) 1.1 10/15/2011 03:12 PM    Calcium, ionized (POC) 1.11 10/15/2011 03:12 PM     Lab Results   Component Value Date/Time    Bilirubin, total 0.4 07/26/2016 12:36 PM    AST (SGOT) 29 07/26/2016 12:36 PM    Alk.  phosphatase 81 07/26/2016 12:36 PM    Protein, total 7.6 07/26/2016 12:36 PM    Albumin 3.9 07/26/2016 12:36 PM    Globulin 3.7 07/26/2016 12:36 PM    A-G Ratio 1.1 07/26/2016 12:36 PM       Lab Results   Component Value Date/Time    Reticulocyte count 1.9 02/20/2014 10:50 AM    Iron % saturation 11 07/17/2017 11:41 AM    TIBC 372 07/17/2017 11:41 AM    Ferritin 12 07/17/2017 11:41 AM    Vitamin B12 380 08/21/2012 09:45 AM    Folate 15.5 08/21/2012 09:45 AM    Haptoglobin 101 02/20/2014 10:50 AM     12/08/2011 02:38 PM    Sed rate (ESR) 5 05/05/2014 10:40 AM    TSH 2.59 07/26/2016 12:36 PM    INR 1.0 10/15/2011 03:07 PM    INR (POC) 1.0 07/26/2016 12:07 PM    Lipase 70 05/10/2012 12:30 PM     Ferritin   Date Value   07/17/2017 12 ng/mL (L)   01/05/2017 24 ng/mL   07/15/2016 18 ng/mL 05/03/2016 4 ng/mL (L)   12/07/2015 10 ng/mL (L)   06/03/2015 17 ng/mL   12/08/2014 79 ng/mL   06/11/2014 40 NG/ML   04/11/2014 19 NG/ML   02/20/2014 5 ng/mL (L)   10/07/2011 5 ng/mL (L)       Stool Blood 2/24/2014: negative  Celiac panel 12/8/2014: negative    EGD and Colonscopy with Dr. Sondra Cast 4/9/2014: severe diverticulosis, nonbleeding  Capsule study 4/30/2014: normal    EGD and Colonoscopy with Dr. Sweta Garcia 6/16/2016: large cecum AVM, one polyp (tubular adenoma), otherwise normal.  Capsule endoscopy 6/30/2016: normal    Assessment:   1) Anemia, Iron Deficiency  Recurrent. HGB normalized s/p feraheme (5/2016 and 7/2016). HGB remains normal now, though drifting down, and iron deficiency appears to have recurred (iron sat 11%, ferritin 12). The patient has not been able to tolerate PO iron replacement. Monitor for now, though with recurring iron deficiency I suspect she will need additional IV iron in the future. The source of her iron deficiency remains uncertain. Based on repeat GI evaluation, it may by due to AVMs (she had a large AVM in her cecum that was treated). Celiac panel was negative, and no reason to suggest another malabsorption issue. No evidence of bleeding from elsewhere. 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 Given recurring iron deficiency, we discussed possible follow up with GI at some point, but she wants to hold off at this point. 3) Fatigue  Not clear that this is related to her iron deficiency anemia, as it persisted even when her HGB was 15.     Plan:     Labs in 6 months: CBC, iron profile, ferritin ([facility])  Return to see me in about 6 months      Signed By: Mino Masterson MD     July 21, 2017

## 2017-07-21 NOTE — TELEPHONE ENCOUNTER
Patient needs referral for Dr. Donnita Frankel Raddin for ongoing treatment for anemia  can be faxed to [phone]

## 2017-07-24 DIAGNOSIS — J30.9 ALLERGIC RHINITIS: ICD-10-CM

## 2017-07-24 RX ORDER — MONTELUKAST SODIUM 10 MG/1
TABLET ORAL
Qty: 90 TAB | Refills: 1 | Status: SHIPPED | OUTPATIENT
Start: 2017-07-24 | End: 2018-01-20 | Stop reason: SDUPTHER

## 2017-07-31 RX ORDER — LANSOPRAZOLE 30 MG/1
CAPSULE, DELAYED RELEASE ORAL
Qty: 90 CAP | Refills: 1 | Status: SHIPPED | OUTPATIENT
Start: 2017-07-31 | End: 2018-01-28 | Stop reason: SDUPTHER

## 2017-08-04 DIAGNOSIS — F41.9 ANXIETY: ICD-10-CM

## 2017-08-04 RX ORDER — PAROXETINE HYDROCHLORIDE 20 MG/1
TABLET, FILM COATED ORAL
Qty: 30 TAB | Refills: 0 | Status: SHIPPED | OUTPATIENT
Start: 2017-08-04 | End: 2017-09-29 | Stop reason: SDUPTHER

## 2017-08-25 DIAGNOSIS — I10 ESSENTIAL HYPERTENSION WITH GOAL BLOOD PRESSURE LESS THAN 130/80: ICD-10-CM

## 2017-08-25 RX ORDER — LOSARTAN POTASSIUM 25 MG/1
TABLET ORAL
Qty: 90 TAB | Refills: 1 | Status: SHIPPED | OUTPATIENT
Start: 2017-08-25 | End: 2018-02-21 | Stop reason: SDUPTHER

## 2017-09-05 RX ORDER — POTASSIUM CHLORIDE 20 MEQ/1
TABLET, EXTENDED RELEASE ORAL
Qty: 270 TAB | Refills: 1 | Status: SHIPPED | OUTPATIENT
Start: 2017-09-05 | End: 2018-03-04 | Stop reason: SDUPTHER

## 2017-09-21 RX ORDER — ATORVASTATIN CALCIUM 10 MG/1
TABLET, FILM COATED ORAL
Qty: 90 TAB | Refills: 1 | Status: SHIPPED | OUTPATIENT
Start: 2017-09-21 | End: 2018-03-20 | Stop reason: SDUPTHER

## 2017-09-29 ENCOUNTER — OFFICE VISIT (OUTPATIENT)
Dept: FAMILY MEDICINE CLINIC | Age: 67
End: 2017-09-29

## 2017-09-29 VITALS
OXYGEN SATURATION: 97 % | HEIGHT: 64 IN | WEIGHT: 228 LBS | DIASTOLIC BLOOD PRESSURE: 72 MMHG | BODY MASS INDEX: 38.93 KG/M2 | HEART RATE: 66 BPM | TEMPERATURE: 97.9 F | RESPIRATION RATE: 20 BRPM | SYSTOLIC BLOOD PRESSURE: 106 MMHG

## 2017-09-29 DIAGNOSIS — M25.542 ARTHRALGIA OF BOTH HANDS: ICD-10-CM

## 2017-09-29 DIAGNOSIS — M25.541 ARTHRALGIA OF BOTH HANDS: ICD-10-CM

## 2017-09-29 DIAGNOSIS — F41.9 ANXIETY: ICD-10-CM

## 2017-09-29 DIAGNOSIS — I10 ESSENTIAL HYPERTENSION: Primary | ICD-10-CM

## 2017-09-29 RX ORDER — PAROXETINE HYDROCHLORIDE 20 MG/1
20 TABLET, FILM COATED ORAL DAILY
Qty: 30 TAB | Refills: 3 | Status: SHIPPED | OUTPATIENT
Start: 2017-09-29 | End: 2018-03-06 | Stop reason: SDUPTHER

## 2017-09-29 RX ORDER — ALPRAZOLAM 0.5 MG/1
0.5 TABLET ORAL
Qty: 30 TAB | Refills: 1 | Status: SHIPPED | OUTPATIENT
Start: 2017-09-29 | End: 2018-05-17

## 2017-09-29 NOTE — PROGRESS NOTES
Subjective:     Julien Galloway is a 74 y.o. female who presents for follow up of hypertension and hyperlipidemia. Diet and Lifestyle: generally follows a low fat low cholesterol diet, generally follows a low sodium diet, sedentary, nonsmoker  Home BP Monitoring: is well controlled at home, ranging 100-120's/70-80's    Cardiovascular ROS: taking medications as instructed, no medication side effects noted, no TIA's, no chest pain on exertion, no dyspnea on exertion, no swelling of ankles, no orthostatic dizziness or lightheadedness. New concerns: accidentally left her paroxetine [place] the beach 4 weeks ago, has noticed increase in anxiety and irritability since stopping. Would like refill on xanax to have \"just in case. \" the Rx she got last September has . She rarely takes, only when having severe symptoms. Recent negative life events: custody leos for grandson, upcoming anniversary of mom's death. Having increased pain, stiffness, and warmth in multiple joints for the past 1-2 months. Reports mom and sister have RA, she'd like to be checked. Patient Active Problem List   Diagnosis Code    GERD (gastroesophageal reflux disease) K21.9    Obesity (BMI 30-39. 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 9)     Vertigo     Vestibulopathy 2016     Past Surgical History:   Procedure Laterality Date    HX COLONOSCOPY  10/25/2011    1 polyp which was removed, diverticulosis in distal descending colon & sigmoid colon.  HX ENDOSCOPY  04/2014    HX GYN  1996    hysterectomy    HX OTHER SURGICAL  2006? L Lower Abd. Basal Cell CA     Family History   Problem Relation Age of Onset    Hypertension Mother     Stroke Maternal Grandmother     Cancer Paternal Grandmother      breast cancer     Social History   Substance Use Topics    Smoking status: Former Smoker     Packs/day: 1.00     Years: 18.00     Types: Cigarettes     Quit date: 7/24/1988    Smokeless tobacco: Never Used      Comment: Quit in 1988    Alcohol use Yes      Comment: rarely        Lab Results  Component Value Date/Time   Cholesterol, total 180 07/26/2016 12:36 PM   HDL Cholesterol 32 07/26/2016 12:36 PM   LDL, calculated 101.2 07/26/2016 12:36 PM   Triglyceride 234 07/26/2016 12:36 PM   CHOL/HDL Ratio 5.6 07/26/2016 12:36 PM     Lab Results  Component Value Date/Time   ALT (SGPT) 37 07/26/2016 12:36 PM   AST (SGOT) 29 07/26/2016 12:36 PM   Alk.  phosphatase 81 07/26/2016 12:36 PM   Bilirubin, direct 0.1 10/15/2011 03:07 PM   Bilirubin, total 0.4 07/26/2016 12:36 PM   Albumin 3.9 07/26/2016 12:36 PM   Protein, total 7.6 07/26/2016 12:36 PM   INR 1.0 10/15/2011 03:07 PM   INR (POC) 1.0 07/26/2016 12:07 PM   Prothrombin time 10.0 10/15/2011 03:07 PM   PLATELET 389 41/53/1162 11:41 AM       Lab Results  Component Value Date/Time   GFR est non-AA >60 07/27/2016 01:03 AM   GFRNA, POC 54 10/15/2011 03:12 PM   GFR est AA >60 07/27/2016 01:03 AM   GFRAA, POC >60 10/15/2011 03:12 PM   Creatinine 0.80 07/27/2016 01:03 AM   Creatinine (POC) 1.1 10/15/2011 03:12 PM   BUN 7 07/27/2016 01:03 AM   BUN (POC) 10 10/15/2011 03:12 PM   Sodium 140 07/27/2016 01:03 AM   Sodium (POC) 141 10/15/2011 03:12 PM   Potassium 3.8 07/27/2016 01:03 AM   Potassium (POC) 3.5 10/15/2011 03:12 PM   Chloride 106 07/27/2016 01:03 AM   Chloride (POC) 104 10/15/2011 03:12 PM   CO2 28 07/27/2016 01:03 AM Magnesium 1.9 07/27/2016 01:03 AM   Lab Results  Component Value Date/Time   TSH 2.59 07/26/2016 12:36 PM   T4, Free 0.98 04/09/2013 04:10 PM            Review of Systems, additional:  A comprehensive review of systems was negative except for that written in the HPI. Objective:   Visit Vitals    /72 (BP 1 Location: Left arm, BP Patient Position: Sitting)    Pulse 66    Temp 97.9 °F (36.6 °C) (Oral)    Resp 20    Ht 5' 4\" (1.626 m)    Wt 228 lb (103.4 kg)    LMP 01/01/1996    SpO2 97%    BMI 39.14 kg/m2     Appearance: alert, well appearing, and in no distress, oriented to person, place, and time, overweight and well hydrated. General exam: CVS exam BP noted to be well controlled today in office, S1, S2 normal, no gallop, no murmur, chest clear, no JVD, no HSM, no edema, peripheral vascular exam both carotids normal upstroke without bruits, neurological exam alert, oriented, normal speech, no focal findings or movement disorder noted. Musculoskeletal exam:  Mild tenderness to palpation right knee along lateral and lower joint line. Mild tenderness over DIP and PIP joints both hands. No increased redness, swelling, or joint warmth. Assessment/Plan:     hypertension well controlled, hyperlipidemia control uncertain. reviewed diet, exercise and weight control  cardiovascular risk and specific lipid/LDL goals reviewed  reviewed medications and side effects in detail  reviewed potential future medication changes and side effects. Diagnoses and all orders for this visit:    1. Essential hypertension  At goal  Continue current meds  Daily walking  DASH diet  -     TSH 3RD GENERATION  -     LIPID PANEL  -     METABOLIC PANEL, COMPREHENSIVE    2. Anxiety  Restart paxil  Xanax for rare use for panic attack/severe symptoms  -     ALPRAZolam (XANAX) 0.5 mg tablet; Take 1 Tab by mouth two (2) times daily as needed for Anxiety. Max Daily Amount: 1 mg.  -     PARoxetine (PAXIL) 20 mg tablet;  Take 1 Tab by mouth daily. 3. Arthralgia of both hands  Family hx of RA  Check labs  -     BALWINDER W/REFLEX  -     RHEUMATOID FACTOR, QL      Follow-up Disposition:  Return in about 3 months (around 12/29/2017). I have discussed the diagnosis with the patient and the intended plan as seen in the above orders. The patient has received an after-visit summary and questions were answered concerning future plans. Patient conveyed understanding of the plan at the time of the visit.     Sinan Monroe NP

## 2017-09-29 NOTE — MR AVS SNAPSHOT
Visit Information Date & Time Provider Department Dept. Phone Encounter #  
 9/29/2017 10:15 AM Conrad Joseph NP [address] [place] [address] [id number] Follow-up Instructions Return in about 3 months (around 12/29/2017). Your Appointments 1/23/2018  9:30 AM  
ESTABLISHED PATIENT with Brad Campbell MD  
[facility] at [address] [facility]) Appt Note: [place]in, iron deficiency fu  
 [address] [facility], [address] [facility] [id number]  
[phone]  
  
   
 [address] [facility], [address] [facility] Upcoming Health Maintenance Date Due Hepatitis C Screening 1950 DTaP/Tdap/Td series (1 - Tdap) 4/21/1973 ZOSTER VACCINE AGE 60> 2/21/2012 BREAST CANCER SCRN MAMMOGRAM 4/14/2016 GLAUCOMA SCREENING Q2Y 4/21/2017 OSTEOPOROSIS SCREENING (DEXA) 4/21/2017 Pneumococcal 65+ Low/Medium Risk (1 of 2 - PCV13) 4/21/2017 INFLUENZA AGE 9 TO ADULT 8/1/2017 MEDICARE YEARLY EXAM 6/9/2018 COLONOSCOPY 4/9/2024 Allergies as of 9/29/2017  Review Complete On: 9/29/2017 By: Conrad Joseph NP Severity Noted Reaction Type Reactions Loratadine  12/08/2011    Nausea Only, Other (comments) Muscle weakness Current Immunizations  Reviewed on 7/29/2016 Name Date Hib (PRP-T) 6/30/2015 Influenza Vaccine 12/17/2015, 10/22/2015 Influenza Vaccine Split 10/26/2012, 10/7/2011 Not reviewed this visit You Were Diagnosed With   
  
 Codes Comments Anxiety    -  Primary ICD-10-CM: F41.9 ICD-9-CM: 300.00 Arthralgia of both hands     ICD-10-CM: M25.541, M25.542 ICD-9-CM: 719.44 Essential hypertension     ICD-10-CM: I10 
ICD-9-CM: 401.9 Vitals BP Pulse Temp Resp Height(growth percentile) Weight(growth percentile)  106/72 (BP 1 Location: Left arm, BP Patient Position: Sitting) 66 97.9 °F (36.6 °C) (Oral) 20 5' 4\" (1.626 m) 228 lb (103.4 kg) LMP SpO2 BMI OB Status Smoking Status 01/01/1996 97% 39.14 kg/m2 Hysterectomy Former Smoker BMI and BSA Data Body Mass Index Body Surface Area  
 39.14 kg/m 2 2.16 m 2 Preferred Pharmacy Pharmacy Name Phone [facility] [address] [place] [address] [phone] Your Updated Medication List  
  
   
This list is accurate as of: 9/29/17 10:53 AM.  Always use your most recent med list.  
  
  
  
  
 * albuterol 90 mcg/actuation inhaler Commonly known as:  VENTOLIN HFA Take 2 Puffs by inhalation every six (6) hours as needed for Wheezing or Shortness of Breath (may also use for cough). * albuterol 2.5 mg /3 mL (0.083 %) nebulizer solution Commonly known as:  PROVENTIL VENTOLIN  
3 mL by Nebulization route every four (4) hours as needed for Wheezing. ALPRAZolam 0.5 mg tablet Commonly known as:  Sharyon Kida Take 1 Tab by mouth two (2) times daily as needed for Anxiety. Max Daily Amount: 1 mg.  
  
 atorvastatin 10 mg tablet Commonly known as:  LIPITOR  
TAKE 1 TABLET DAILY DULERA 200-5 mcg/actuation HFA inhaler Generic drug:  mometasone-formoterol USE 2 INHALATIONS TWICE A DAY  
  
 hydrocortisone 2.5 % topical cream  
Commonly known as:  HYTONE Apply  to affected area two (2) times a day. use thin layer  
  
 ibuprofen 800 mg tablet Commonly known as:  MOTRIN Take 1 Tab by mouth every eight (8) hours as needed for Pain. * ipratropium 17 mcg/actuation inhaler Commonly known as:  ATROVENT HFA Take 1 Puff by inhalation every six (6) hours as needed for Wheezing. * ipratropium 0.02 % Soln Commonly known as:  ATROVENT  
2.5 mL by Nebulization route as needed for Wheezing (every 6 hours as needed). lansoprazole 30 mg capsule Commonly known as:  PREVACID TAKE 1 CAPSULE DAILY BEFORE BREAKFAST levocetirizine 5 mg tablet Commonly known as:  Shirley Pope Take 1 Tab by mouth daily. losartan 25 mg tablet Commonly known as:  COZAAR  
TAKE 1 TABLET DAILY  
  
 metoprolol succinate 25 mg XL tablet Commonly known as:  TOPROL-XL  
TAKE 1 TABLET DAILY  
  
 montelukast 10 mg tablet Commonly known as:  SINGULAIR  
TAKE 1 TABLET DAILY Nebulizer & Compressor machine Use as directed PARoxetine 20 mg tablet Commonly known as:  PAXIL Take 1 Tab by mouth daily. potassium chloride 20 mEq tablet Commonly known as:  K-DUR, KLOR-CON  
TAKE 1 TABLET THREE TIMES A DAY  
  
 sodium bicarb-sodium chloride Kit Commonly known as:  NEILMED SINUS RINSE COMPLETE  
1 Packet by Nasal route daily. triamterene-hydroCHLOROthiazide 37.5-25 mg per tablet Commonly known as:  Po [place]id Take 1 Tab by mouth daily. * Notice: This list has 4 medication(s) that are the same as other medications prescribed for you. Read the directions carefully, and ask your doctor or other care provider to review them with you. Prescriptions Printed Refills ALPRAZolam (XANAX) 0.5 mg tablet 1 Sig: Take 1 Tab by mouth two (2) times daily as needed for Anxiety. Max Daily Amount: 1 mg. Class: Print Route: Oral  
  
Prescriptions Sent to Pharmacy Refills PARoxetine (PAXIL) 20 mg tablet 3 Sig: Take 1 Tab by mouth daily. Class: Normal  
 Pharmacy: [facility] [address] [place] [address] [address] [place] [address] #: [phone] Route: Oral  
  
We Performed the Following BALWINDER W/REFLEX [02685 CPT(R)] LIPID PANEL [51811 CPT(R)] METABOLIC PANEL, COMPREHENSIVE [38608 CPT(R)] RHEUMATOID FACTOR, QL [id number] CPT(R)] TSH 3RD GENERATION [46230 CPT(R)] Follow-up Instructions Return in about 3 months (around 12/29/2017). Patient Instructions Anxiety Disorder: Care Instructions Your Care Instructions Anxiety is a normal reaction to stress. Difficult situations can cause you to have symptoms such as sweaty palms and a nervous feeling. In an anxiety disorder, the symptoms are far more severe. Constant worry, muscle tension, trouble sleeping, nausea and diarrhea, and other symptoms can make normal daily activities difficult or impossible. These symptoms may occur for no reason, and they can affect your work, school, or social life. Medicines, counseling, and self-care can all help. Follow-up care is a key part of your treatment and safety. Be sure to make and go to all appointments, and call your doctor if you are having problems. It's also a good idea to know your test results and keep a list of the medicines you take. How can you care for yourself at home? · Take medicines exactly as directed. Call your doctor if you think you are having a problem with your medicine. · Go to your counseling sessions and follow-up appointments. · Recognize and accept your anxiety. Then, when you are in a situation that makes you anxious, say to yourself, \"This is not an emergency. I feel uncomfortable, but I am not in danger. I can keep going even if I feel anxious. \" · Be kind to your body: ¨ Relieve tension with exercise or a massage. ¨ Get enough rest. 
¨ Avoid alcohol, caffeine, nicotine, and illegal drugs. They can increase your anxiety level and cause sleep problems. ¨ Learn and do relaxation techniques. See below for more about these techniques. · Engage your mind. Get out and do something you enjoy. Go to a funny movie, or take a walk or hike. Plan your day. Having too much or too little to do can make you anxious. · Keep a record of your symptoms. Discuss your fears with a good friend or family member, or join a support group for people with similar problems. Talking to others sometimes relieves stress. · Get involved in social groups, or volunteer to help others.  Being alone sometimes makes things seem worse than they are. · Get at least 30 minutes of exercise on most days of the week to relieve stress. Walking is a good choice. You also may want to do other activities, such as running, swimming, cycling, or playing tennis or team sports. Relaxation techniques Do relaxation exercises 10 to 20 minutes a day. You can play soothing, relaxing music while you do them, if you wish. · Tell others in your house that you are going to do your relaxation exercises. Ask them not to disturb you. · Find a comfortable place, away from all distractions and noise. · Lie down on your back, or sit with your back straight. · Focus on your breathing. Make it slow and steady. · Breathe in through your nose. Breathe out through either your nose or mouth. · Breathe deeply, filling up the area between your navel and your rib cage. Breathe so that your belly goes up and down. · Do not hold your breath. · Breathe like this for 5 to 10 minutes. Notice the feeling of calmness throughout your whole body. As you continue to breathe slowly and deeply, relax by doing the following for another 5 to 10 minutes: · Tighten and relax each muscle group in your body. You can begin at your toes and work your way up to your head. · Imagine your muscle groups relaxing and becoming heavy. · Empty your mind of all thoughts. · Let yourself relax more and more deeply. · Become aware of the state of calmness that surrounds you. · When your relaxation time is over, you can bring yourself back to alertness by moving your fingers and toes and then your hands and feet and then stretching and moving your entire body. Sometimes people fall asleep during relaxation, but they usually wake up shortly afterward. · Always give yourself time to return to full alertness before you drive a car or do anything that might cause an accident if you are not fully alert. Never play a relaxation tape while you drive a car. When should you call for help? Call 911 anytime you think you may need emergency care. For example, call if: 
· You feel you cannot stop from hurting yourself or someone else. Keep the numbers for these national suicide hotlines: 9-478-303-TALK (1-542.895.1201) and 5-804-NVFDRBU (3-364.512.9704). If you or someone you know talks about suicide or feeling hopeless, get help right away. Watch closely for changes in your health, and be sure to contact your doctor if: 
· You have anxiety or fear that affects your life. · You have symptoms of anxiety that are new or different from those you had before. Where can you learn more? Go to http://negrita-zuleyka.info/. Enter P754 in the search box to learn more about \"Anxiety Disorder: Care Instructions. \" Current as of: July 26, 2016 Content Version: 11.3 © 2193-0452 [employer]. Care instructions adapted under license by [employer] (which disclaims liability or warranty for this information). If you have questions about a medical condition or this instruction, always ask your healthcare professional. [address] any warranty or liability for your use of this information. DASH Diet: Care Instructions Your Care Instructions The DASH diet is an eating plan that can help lower your blood pressure. DASH stands for Dietary Approaches to Stop Hypertension. Hypertension is high blood pressure. The DASH diet focuses on eating foods that are high in calcium, potassium, and magnesium. These nutrients can lower blood pressure. The foods that are highest in these nutrients are fruits, vegetables, low-fat dairy products, nuts, seeds, and legumes. But taking calcium, potassium, and magnesium supplements instead of eating foods that are high in those nutrients does not have the same effect. The DASH diet also includes whole grains, fish, and poultry. The DASH diet is one of several lifestyle changes your doctor may recommend to lower your high blood pressure. Your doctor may also want you to decrease the amount of sodium in your diet. Lowering sodium while following the DASH diet can lower blood pressure even further than just the DASH diet alone. Follow-up care is a key part of your treatment and safety. Be sure to make and go to all appointments, and call your doctor if you are having problems. It's also a good idea to know your test results and keep a list of the medicines you take. How can you care for yourself at home? Following the DASH diet · Eat 4 to 5 servings of fruit each day. A serving is 1 medium-sized piece of fruit, ½ cup chopped or canned fruit, 1/4 cup dried fruit, or 4 ounces (½ cup) of fruit juice. Choose fruit more often than fruit juice. · Eat 4 to 5 servings of vegetables each day. A serving is 1 cup of lettuce or raw leafy vegetables, ½ cup of chopped or cooked vegetables, or 4 ounces (½ cup) of vegetable juice. Choose vegetables more often than vegetable juice. · Get 2 to 3 servings of low-fat and fat-free dairy each day. A serving is 8 ounces of milk, 1 cup of yogurt, or 1 ½ ounces of cheese. · Eat 6 to 8 servings of grains each day. A serving is 1 slice of bread, 1 ounce of dry cereal, or ½ cup of cooked rice, pasta, or cooked cereal. Try to choose whole-grain products as much as possible. · Limit lean meat, poultry, and fish to 2 servings each day. A serving is 3 ounces, about the size of a deck of cards. · Eat 4 to 5 servings of nuts, seeds, and legumes (cooked dried beans, lentils, and split peas) each week. A serving is 1/3 cup of nuts, 2 tablespoons of seeds, or ½ cup of cooked beans or peas. · Limit fats and oils to 2 to 3 servings each day. A serving is 1 teaspoon of vegetable oil or 2 tablespoons of salad dressing. · Limit sweets and added sugars to 5 servings or less a week.  A serving is 1 tablespoon jelly or jam, ½ cup sorbet, or 1 cup of lemonade. · Eat less than 2,300 milligrams (mg) of sodium a day. If you limit your sodium to 1,500 mg a day, you can lower your blood pressure even more. Tips for success · Start small. Do not try to make dramatic changes to your diet all at once. You might feel that you are missing out on your favorite foods and then be more likely to not follow the plan. Make small changes, and stick with them. Once those changes become habit, add a few more changes. · Try some of the following: ¨ Make it a goal to eat a fruit or vegetable at every meal and at snacks. This will make it easy to get the recommended amount of fruits and vegetables each day. ¨ Try yogurt topped with fruit and nuts for a snack or healthy dessert. ¨ Add lettuce, tomato, cucumber, and onion to sandwiches. ¨ Combine a ready-made pizza crust with low-fat mozzarella cheese and lots of vegetable toppings. Try using tomatoes, squash, spinach, broccoli, carrots, cauliflower, and onions. ¨ Have a variety of cut-up vegetables with a low-fat dip as an appetizer instead of chips and dip. ¨ Sprinkle sunflower seeds or chopped almonds over salads. Or try adding chopped walnuts or almonds to cooked vegetables. ¨ Try some vegetarian meals using beans and peas. Add garbanzo or kidney beans to salads. Make burritos and tacos with mashed morse beans or black beans. Where can you learn more? Go to http://negrtia-zuleyka.info/. Enter A153 in the search box to learn more about \"DASH Diet: Care Instructions. \" Current as of: April 3, 2017 Content Version: 11.3 © 3055-7276 [employer]. Care instructions adapted under license by [employer] (which disclaims liability or warranty for this information).  If you have questions about a medical condition or this instruction, always ask your healthcare professional. [employer] disclaims any warranty or liability for your use of this information. Introducing [facility]! Marina Hinkle introduces [employer] patient portal. Now you can access parts of your medical record, email your doctor's office, and request medication refills online. 1. In your internet browser, go to [email or web address] 2. Click on the First Time User? Click Here link in the Sign In box. You will see the New Member Sign Up page. 3. Enter your [employer] Access Code exactly as it appears below. You will not need to use this code after youve completed the sign-up process. If you do not sign up before the expiration date, you must request a new code. · [employer] Access Code: [email or web address] Expires: 12/28/2017 10:53 AM 
 
4. Enter the last four digits of your Social Security Number (xxxx) and Date of Birth (mm/dd/yyyy) as indicated and click Submit. You will be taken to the next sign-up page. 5. Create a [employer] ID. This will be your [employer] login ID and cannot be changed, so think of one that is secure and easy to remember. 6. Create a [employer] password. You can change your password at any time. 7. Enter your Password Reset Question and Answer. This can be used at a later time if you forget your password. 8. Enter your e-mail address. You will receive e-mail notification when new information is available in [address] 9. Click Sign Up. You can now view and download portions of your medical record. 10. Click the Download Summary menu link to download a portable copy of your medical information. If you have questions, please visit the Frequently Asked Questions section of the [employer] website. Remember, [employer] is NOT to be used for urgent needs. For medical emergencies, dial 911. Now available from your iPhone and Android! Please provide this summary of care documentation to your next provider. Your primary care clinician is listed as Romulo Subramanianrel. If you have any questions after today's visit, please call [phone].

## 2017-09-29 NOTE — PATIENT INSTRUCTIONS
Anxiety Disorder: Care Instructions  Your Care Instructions  Anxiety is a normal reaction to stress. Difficult situations can cause you to have symptoms such as sweaty palms and a nervous feeling. In an anxiety disorder, the symptoms are far more severe. Constant worry, muscle tension, trouble sleeping, nausea and diarrhea, and other symptoms can make normal daily activities difficult or impossible. These symptoms may occur for no reason, and they can affect your work, school, or social life. Medicines, counseling, and self-care can all help. Follow-up care is a key part of your treatment and safety. Be sure to make and go to all appointments, and call your doctor if you are having problems. It's also a good idea to know your test results and keep a list of the medicines you take. How can you care for yourself at home? · Take medicines exactly as directed. Call your doctor if you think you are having a problem with your medicine. · Go to your counseling sessions and follow-up appointments. · Recognize and accept your anxiety. Then, when you are in a situation that makes you anxious, say to yourself, \"This is not an emergency. I feel uncomfortable, but I am not in danger. I can keep going even if I feel anxious. \"  · Be kind to your body:  ¨ Relieve tension with exercise or a massage. ¨ Get enough rest.  ¨ Avoid alcohol, caffeine, nicotine, and illegal drugs. They can increase your anxiety level and cause sleep problems. ¨ Learn and do relaxation techniques. See below for more about these techniques. · Engage your mind. Get out and do something you enjoy. Go to a funny movie, or take a walk or hike. Plan your day. Having too much or too little to do can make you anxious. · Keep a record of your symptoms. Discuss your fears with a good friend or family member, or join a support group for people with similar problems. Talking to others sometimes relieves stress.   · Get involved in social groups, or volunteer to help others. Being alone sometimes makes things seem worse than they are. · Get at least 30 minutes of exercise on most days of the week to relieve stress. Walking is a good choice. You also may want to do other activities, such as running, swimming, cycling, or playing tennis or team sports. Relaxation techniques  Do relaxation exercises 10 to 20 minutes a day. You can play soothing, relaxing music while you do them, if you wish. · Tell others in your house that you are going to do your relaxation exercises. Ask them not to disturb you. · Find a comfortable place, away from all distractions and noise. · Lie down on your back, or sit with your back straight. · Focus on your breathing. Make it slow and steady. · Breathe in through your nose. Breathe out through either your nose or mouth. · Breathe deeply, filling up the area between your navel and your rib cage. Breathe so that your belly goes up and down. · Do not hold your breath. · Breathe like this for 5 to 10 minutes. Notice the feeling of calmness throughout your whole body. As you continue to breathe slowly and deeply, relax by doing the following for another 5 to 10 minutes:  · Tighten and relax each muscle group in your body. You can begin at your toes and work your way up to your head. · Imagine your muscle groups relaxing and becoming heavy. · Empty your mind of all thoughts. · Let yourself relax more and more deeply. · Become aware of the state of calmness that surrounds you. · When your relaxation time is over, you can bring yourself back to alertness by moving your fingers and toes and then your hands and feet and then stretching and moving your entire body. Sometimes people fall asleep during relaxation, but they usually wake up shortly afterward. · Always give yourself time to return to full alertness before you drive a car or do anything that might cause an accident if you are not fully alert.  Never play a relaxation tape while you drive a car. When should you call for help? Call 911 anytime you think you may need emergency care. For example, call if:  · You feel you cannot stop from hurting yourself or someone else. Keep the numbers for these national suicide hotlines: 5-776-134-TALK (5-382.951.1930) and 4-558-YICIUEY (4-902.552.5272). If you or someone you know talks about suicide or feeling hopeless, get help right away. Watch closely for changes in your health, and be sure to contact your doctor if:  · You have anxiety or fear that affects your life. · You have symptoms of anxiety that are new or different from those you had before. Where can you learn more? Go to http://negritaMELA Scienceszuleyka.info/. Enter P754 in the search box to learn more about \"Anxiety Disorder: Care Instructions. \"  Current as of: July 26, 2016  Content Version: 11.3  © 6559-4212 [employer]. Care instructions adapted under license by [employer] (which disclaims liability or warranty for this information). If you have questions about a medical condition or this instruction, always ask your healthcare professional. [place] [address] any warranty or liability for your use of this information. DASH Diet: Care Instructions  Your Care Instructions  The DASH diet is an eating plan that can help lower your blood pressure. DASH stands for Dietary Approaches to Stop Hypertension. Hypertension is high blood pressure. The DASH diet focuses on eating foods that are high in calcium, potassium, and magnesium. These nutrients can lower blood pressure. The foods that are highest in these nutrients are fruits, vegetables, low-fat dairy products, nuts, seeds, and legumes. But taking calcium, potassium, and magnesium supplements instead of eating foods that are high in those nutrients does not have the same effect. The DASH diet also includes whole grains, fish, and poultry.   The DASH diet is one of several lifestyle changes your doctor may recommend to lower your high blood pressure. Your doctor may also want you to decrease the amount of sodium in your diet. Lowering sodium while following the DASH diet can lower blood pressure even further than just the DASH diet alone. Follow-up care is a key part of your treatment and safety. Be sure to make and go to all appointments, and call your doctor if you are having problems. It's also a good idea to know your test results and keep a list of the medicines you take. How can you care for yourself at home? Following the DASH diet  · Eat 4 to 5 servings of fruit each day. A serving is 1 medium-sized piece of fruit, ½ cup chopped or canned fruit, 1/4 cup dried fruit, or 4 ounces (½ cup) of fruit juice. Choose fruit more often than fruit juice. · Eat 4 to 5 servings of vegetables each day. A serving is 1 cup of lettuce or raw leafy vegetables, ½ cup of chopped or cooked vegetables, or 4 ounces (½ cup) of vegetable juice. Choose vegetables more often than vegetable juice. · Get 2 to 3 servings of low-fat and fat-free dairy each day. A serving is 8 ounces of milk, 1 cup of yogurt, or 1 ½ ounces of cheese. · Eat 6 to 8 servings of grains each day. A serving is 1 slice of bread, 1 ounce of dry cereal, or ½ cup of cooked rice, pasta, or cooked cereal. Try to choose whole-grain products as much as possible. · Limit lean meat, poultry, and fish to 2 servings each day. A serving is 3 ounces, about the size of a deck of cards. · Eat 4 to 5 servings of nuts, seeds, and legumes (cooked dried beans, lentils, and split peas) each week. A serving is 1/3 cup of nuts, 2 tablespoons of seeds, or ½ cup of cooked beans or peas. · Limit fats and oils to 2 to 3 servings each day. A serving is 1 teaspoon of vegetable oil or 2 tablespoons of salad dressing. · Limit sweets and added sugars to 5 servings or less a week.  A serving is 1 tablespoon jelly or jam, ½ cup sorbet, or 1 cup of lemonade. · Eat less than 2,300 milligrams (mg) of sodium a day. If you limit your sodium to 1,500 mg a day, you can lower your blood pressure even more. Tips for success  · Start small. Do not try to make dramatic changes to your diet all at once. You might feel that you are missing out on your favorite foods and then be more likely to not follow the plan. Make small changes, and stick with them. Once those changes become habit, add a few more changes. · Try some of the following:  ¨ Make it a goal to eat a fruit or vegetable at every meal and at snacks. This will make it easy to get the recommended amount of fruits and vegetables each day. ¨ Try yogurt topped with fruit and nuts for a snack or healthy dessert. ¨ Add lettuce, tomato, cucumber, and onion to sandwiches. ¨ Combine a ready-made pizza crust with low-fat mozzarella cheese and lots of vegetable toppings. Try using tomatoes, squash, spinach, broccoli, carrots, cauliflower, and onions. ¨ Have a variety of cut-up vegetables with a low-fat dip as an appetizer instead of chips and dip. ¨ Sprinkle sunflower seeds or chopped almonds over salads. Or try adding chopped walnuts or almonds to cooked vegetables. ¨ Try some vegetarian meals using beans and peas. Add garbanzo or kidney beans to salads. Make burritos and tacos with mashed morse beans or black beans. Where can you learn more? Go to http://negrita-zuleyka.info/. Enter U502 in the search box to learn more about \"DASH Diet: Care Instructions. \"  Current as of: April 3, 2017  Content Version: 11.3  © 1378-2881 [employer]. Care instructions adapted under license by [employer] (which disclaims liability or warranty for this information). If you have questions about a medical condition or this instruction, always ask your healthcare professional. [place] [address] any warranty or liability for your use of this information.

## 2017-10-05 LAB
ALBUMIN SERPL-MCNC: 4.1 G/DL (ref 3.6–4.8)
ALBUMIN/GLOB SERPL: 1.5 {RATIO} (ref 1.2–2.2)
ALP SERPL-CCNC: 64 IU/L (ref 39–117)
ALT SERPL-CCNC: 14 IU/L (ref 0–32)
ANA SER QL: NEGATIVE
AST SERPL-CCNC: 20 IU/L (ref 0–40)
BILIRUB SERPL-MCNC: 0.3 MG/DL (ref 0–1.2)
BUN SERPL-MCNC: 11 MG/DL (ref 8–27)
BUN/CREAT SERPL: 13 (ref 12–28)
CALCIUM SERPL-MCNC: 9 MG/DL (ref 8.7–10.3)
CHLORIDE SERPL-SCNC: 99 MMOL/L (ref 96–106)
CHOLEST SERPL-MCNC: 139 MG/DL (ref 100–199)
CO2 SERPL-SCNC: 27 MMOL/L (ref 18–29)
CREAT SERPL-MCNC: 0.82 MG/DL (ref 0.57–1)
GLOBULIN SER CALC-MCNC: 2.7 G/DL (ref 1.5–4.5)
GLUCOSE SERPL-MCNC: 94 MG/DL (ref 65–99)
HDLC SERPL-MCNC: 29 MG/DL
INTERPRETATION, 910389: NORMAL
LDLC SERPL CALC-MCNC: 69 MG/DL (ref 0–99)
POTASSIUM SERPL-SCNC: 3.9 MMOL/L (ref 3.5–5.2)
PROT SERPL-MCNC: 6.8 G/DL (ref 6–8.5)
RHEUMATOID FACT SERPL-ACNC: <10 IU/ML (ref 0–13.9)
SODIUM SERPL-SCNC: 141 MMOL/L (ref 134–144)
TRIGL SERPL-MCNC: 204 MG/DL (ref 0–149)
TSH SERPL DL<=0.005 MIU/L-ACNC: 2.37 UIU/ML (ref 0.45–4.5)
VLDLC SERPL CALC-MCNC: 41 MG/DL (ref 5–40)

## 2017-10-09 NOTE — PROGRESS NOTES
BALWINDER, Rheumatoid factor were normal.  Thyroid is normal.  Metabolic panel normal.  Triglycerides are elevated, and HDL is too low. Recommend decreasing sugar, simple carbs, and alcohol in the diet. HDL can be increased with regular exercise- recommend daily walking 20 minutes to start. Recheck in 6 months.

## 2017-10-17 DIAGNOSIS — I10 ESSENTIAL HYPERTENSION WITH GOAL BLOOD PRESSURE LESS THAN 130/80: ICD-10-CM

## 2017-10-17 RX ORDER — TRIAMTERENE/HYDROCHLOROTHIAZID 37.5-25 MG
TABLET ORAL
Qty: 90 TAB | Refills: 1 | Status: SHIPPED | OUTPATIENT
Start: 2017-10-17 | End: 2018-04-15 | Stop reason: SDUPTHER

## 2017-10-17 RX ORDER — LEVOCETIRIZINE DIHYDROCHLORIDE 5 MG/1
TABLET, FILM COATED ORAL
Qty: 90 TAB | Refills: 1 | Status: SHIPPED | OUTPATIENT
Start: 2017-10-17 | End: 2018-05-20 | Stop reason: SDUPTHER

## 2017-10-30 RX ORDER — METOPROLOL SUCCINATE 25 MG/1
TABLET, EXTENDED RELEASE ORAL
Qty: 90 TAB | Refills: 1 | Status: SHIPPED | OUTPATIENT
Start: 2017-10-30 | End: 2018-04-28 | Stop reason: SDUPTHER

## 2018-01-16 ENCOUNTER — TELEPHONE (OUTPATIENT)
Dept: ONCOLOGY | Age: 68
End: 2018-01-16

## 2018-01-16 NOTE — TELEPHONE ENCOUNTER
[facility]  [phone]    01/16/18- Phone call placed to pt to remind pt to have labs drawn prior to her follow up appointment with . Patients  reported she is having labs drawn now.

## 2018-01-17 LAB
ERYTHROCYTE [DISTWIDTH] IN BLOOD BY AUTOMATED COUNT: 16.3 % (ref 12.3–15.4)
FERRITIN SERPL-MCNC: 8 NG/ML (ref 15–150)
HCT VFR BLD AUTO: 36.4 % (ref 34–46.6)
HGB BLD-MCNC: 11.1 G/DL (ref 11.1–15.9)
IRON SATN MFR SERPL: 7 % (ref 15–55)
IRON SERPL-MCNC: 29 UG/DL (ref 27–139)
MCH RBC QN AUTO: 22.5 PG (ref 26.6–33)
MCHC RBC AUTO-ENTMCNC: 30.5 G/DL (ref 31.5–35.7)
MCV RBC AUTO: 74 FL (ref 79–97)
PLATELET # BLD AUTO: 229 X10E3/UL (ref 150–379)
RBC # BLD AUTO: 4.93 X10E6/UL (ref 3.77–5.28)
TIBC SERPL-MCNC: 401 UG/DL (ref 250–450)
UIBC SERPL-MCNC: 372 UG/DL (ref 118–369)
WBC # BLD AUTO: 8.2 X10E3/UL (ref 3.4–10.8)

## 2018-01-20 DIAGNOSIS — J30.9 ALLERGIC RHINITIS: ICD-10-CM

## 2018-01-22 RX ORDER — MONTELUKAST SODIUM 10 MG/1
TABLET ORAL
Qty: 90 TAB | Refills: 1 | Status: SHIPPED | OUTPATIENT
Start: 2018-01-22 | End: 2019-06-28 | Stop reason: SDUPTHER

## 2018-01-24 ENCOUNTER — TELEPHONE (OUTPATIENT)
Dept: ONCOLOGY | Age: 68
End: 2018-01-24

## 2018-01-24 NOTE — TELEPHONE ENCOUNTER
[facility] at [facility]  [phone]    01/24/18- Phone call placed to pt to remind pt to have labs drawn prior to her follow up appointment with .  left for patient.

## 2018-01-31 ENCOUNTER — OFFICE VISIT (OUTPATIENT)
Dept: ONCOLOGY | Age: 68
End: 2018-01-31

## 2018-01-31 VITALS
BODY MASS INDEX: 38.76 KG/M2 | OXYGEN SATURATION: 98 % | HEART RATE: 65 BPM | WEIGHT: 227 LBS | SYSTOLIC BLOOD PRESSURE: 127 MMHG | RESPIRATION RATE: 20 BRPM | TEMPERATURE: 97.9 F | DIASTOLIC BLOOD PRESSURE: 69 MMHG | HEIGHT: 64 IN

## 2018-01-31 DIAGNOSIS — D50.0 IRON DEFICIENCY ANEMIA DUE TO CHRONIC BLOOD LOSS: Primary | ICD-10-CM

## 2018-01-31 DIAGNOSIS — J44.9 CHRONIC OBSTRUCTIVE PULMONARY DISEASE, UNSPECIFIED COPD TYPE (HCC): ICD-10-CM

## 2018-01-31 DIAGNOSIS — K55.21 AVM (ARTERIOVENOUS MALFORMATION) OF COLON WITH HEMORRHAGE: ICD-10-CM

## 2018-01-31 NOTE — PROGRESS NOTES
[facility] at [address], [address] [place] [address] [place]: [phone]  F: [phone]      Reason for Visit:   Mily Paredes is a 74 y.o. female who is seen for follow up of iron deficiency anemia. History of Present Illness:   She reports continued fatigue, no improvement. No bleeding. Normal stool. No melena. PAST HISTORY: The following sections were reviewed and updated in the EMR as appropriate: PMH, SH, FH, Medications, Allergies. She is unaccompanied today. She works part time at an office. She lives in [place] with her , grown son, and her grandson is with them part-time. Allergies   Allergen Reactions    Loratadine Nausea Only and Other (comments)     Muscle weakness      Review of Systems: A complete review of systems was obtained, reviewed, and scanned into the EMR. Pertinent findings reviewed above. Physical Exam:     Visit Vitals    /69 (BP 1 Location: Left arm, BP Patient Position: Sitting)  Comment: .  Pulse 65    Temp 97.9 °F (36.6 °C) (Temporal)    Resp 20    Ht 5' 4\" (1.626 m)    Wt 227 lb (103 kg)    LMP 01/01/1996    SpO2 98%    BMI 38.96 kg/m2     General: No distress  Eyes: PERRLA, anicteric sclerae  HENT: Atraumatic, OP clear  Neck: Supple  Lymphatic: No cervical, supraclavicular, or inguinal adenopathy  Respiratory: CTAB, normal respiratory effort  CV: Normal rate, regular rhythm, no murmurs, no peripheral edema  GI: Soft, nontender, nondistended, no masses, no hepatomegaly, no splenomegaly  MS: Normal gait and station. Digits without clubbing or cyanosis. Skin: No rashes, ecchymoses, or petechiae. Normal temperature, turgor, and texture.   Psych: Alert, oriented, appropriate affect, normal judgment/insight    Results:     Lab Results   Component Value Date/Time    WBC 8.2 01/16/2018 11:28 AM    HGB 11.1 01/16/2018 11:28 AM    HCT 36.4 01/16/2018 11:28 AM    PLATELET 141 97/87/6732 11:28 AM    MCV 74 01/16/2018 11:28 AM    ABS. NEUTROPHILS 5.4 04/17/2017 01:49 PM    Hemoglobin (POC) 8.8 02/20/2014 11:34 AM    Hemoglobin (POC) 10.2 10/15/2011 03:12 PM    Hematocrit (POC) 30 10/15/2011 03:12 PM     Lab Results   Component Value Date/Time    Sodium 141 10/04/2017 09:28 AM    Potassium 3.9 10/04/2017 09:28 AM    Chloride 99 10/04/2017 09:28 AM    CO2 27 10/04/2017 09:28 AM    Glucose 94 10/04/2017 09:28 AM    BUN 11 10/04/2017 09:28 AM    Creatinine 0.82 10/04/2017 09:28 AM    GFR est AA 87 10/04/2017 09:28 AM    GFR est non-AA 75 10/04/2017 09:28 AM    Calcium 9.0 10/04/2017 09:28 AM    Sodium (POC) 141 10/15/2011 03:12 PM    Potassium (POC) 3.5 10/15/2011 03:12 PM    Chloride (POC) 104 10/15/2011 03:12 PM    Glucose (POC) 83 07/26/2016 12:03 PM    Glucose POC 90 12/05/2016 11:08 AM    BUN (POC) 10 10/15/2011 03:12 PM    Creatinine (POC) 1.1 10/15/2011 03:12 PM    Calcium, ionized (POC) 1.11 10/15/2011 03:12 PM     Lab Results   Component Value Date/Time    Bilirubin, total 0.3 10/04/2017 09:28 AM    AST (SGOT) 20 10/04/2017 09:28 AM    Alk.  phosphatase 64 10/04/2017 09:28 AM    Protein, total 6.8 10/04/2017 09:28 AM    Albumin 4.1 10/04/2017 09:28 AM    Globulin 3.7 07/26/2016 12:36 PM    A-G Ratio 1.5 10/04/2017 09:28 AM       Lab Results   Component Value Date/Time    Reticulocyte count 1.9 02/20/2014 10:50 AM    Iron % saturation 7 01/16/2018 11:28 AM    TIBC 401 01/16/2018 11:28 AM    Ferritin 8 01/16/2018 11:28 AM    Vitamin B12 380 08/21/2012 09:45 AM    Folate 15.5 08/21/2012 09:45 AM    Haptoglobin 101 02/20/2014 10:50 AM     12/08/2011 02:38 PM    Sed rate (ESR) 5 05/05/2014 10:40 AM    TSH 2.370 10/04/2017 09:28 AM    INR 1.0 10/15/2011 03:07 PM    INR (POC) 1.0 07/26/2016 12:07 PM    Lipase 70 05/10/2012 12:30 PM     Ferritin   Date Value   01/16/2018 8 ng/mL (L)   07/17/2017 12 ng/mL (L)   01/05/2017 24 ng/mL   07/15/2016 18 ng/mL   05/03/2016 4 ng/mL (L)   12/07/2015 10 ng/mL (L) 06/03/2015 17 ng/mL   12/08/2014 79 ng/mL   06/11/2014 40 NG/ML   04/11/2014 19 NG/ML   02/20/2014 5 ng/mL (L)   10/07/2011 5 ng/mL (L)       Stool Blood 2/24/2014: negative  Celiac panel 12/8/2014: negative    EGD and Colonscopy with Dr. Tamiko Guerrero 4/9/2014: severe diverticulosis, nonbleeding  Capsule study 4/30/2014: normal    EGD and Colonoscopy with Dr. Delores Shaw 6/16/2016: large cecum AVM, one polyp (tubular adenoma), otherwise normal.  Capsule endoscopy 6/30/2016: normal    Assessment:   1) Anemia, Iron Deficiency  Recurrent. HGB normalized s/p feraheme (5/2016 and 7/2016). HGB has been steadily falling over the past year, from 13.8 to 11.1, along with recurring microcytosis and iron deficiency (iron sat 7%, ferritin 8). She has not been able to tolerate PO iron replacement. My recommendation is to redose some IV iron. The risks and benefits of therapy were discussed today and the patient has consented to receiving treatment. The source of her iron deficiency remains uncertain. Based on repeat GI evaluation, it may by due to AVMs (she had a large AVM in her cecum that was treated). Celiac panel was negative, and no reason to suggest another malabsorption issue. No evidence of bleeding from elsewhere. 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 Given recurring iron deficiency, we discussed possible follow up with GI, which she will think about. 3) Fatigue  Not clear that this is related to her iron deficiency anemia, as it persisted even when her HGB was 15. We will see if it improves after additional IV iron. 4) COPD  Follows with pulmonary, sees Dr. Alesia Early next week.     Plan:     Injectafer 750mg IV weekly for 2 doses  Labs in 3-4 months: CBC, iron profile, ferritin ([facility])  Return to see me in 3-4 months      Signed By: Pricilla Salas MD

## 2018-01-31 NOTE — MR AVS SNAPSHOT
[address] 
 
 
 [address] [facility], [address] [facility] 
[phone] Patient: Roselyn Villarreal MRN: [id number] QMK:[id number][birth year]51[birth year][id number] Visit Information Date & Time Provider Department Dept. Phone Encounter #  
 1/31/2018  1:00 PM [facility] at [address] [place] [address] [id number] Follow-up Instructions Return in about 3 months (around 4/30/2018) for Raddin, iron deficiency fu. Follow-up and Disposition History Your Appointments 5/1/2018 10:00 AM  
ESTABLISHED PATIENT with MD [facility] at [facility]) Appt Note: Raddin, iron deficiency fu.  
 [address] [facility], [address] [place] [id number]  
[phone]  
  
   
 [address] [facility], [address] Upcoming Health Maintenance Date Due Hepatitis C Screening 1950 DTaP/Tdap/Td series (1 - Tdap) 4/21/1973 ZOSTER VACCINE AGE 60> 2/21/2012 BREAST CANCER SCRN MAMMOGRAM 4/14/2016 GLAUCOMA SCREENING Q2Y 4/21/2017 OSTEOPOROSIS SCREENING (DEXA) 4/21/2017 Pneumococcal 65+ Low/Medium Risk (1 of 2 - PCV13) 4/21/2017 Influenza Age 5 to Adult 8/1/2017 MEDICARE YEARLY EXAM 6/9/2018 COLONOSCOPY 4/9/2024 Allergies as of 1/31/2018  Review Complete On: 1/31/2018 By: Maria Guadalupe Zambrano MD  
  
 Severity Noted Reaction Type Reactions Loratadine  12/08/2011    Nausea Only, Other (comments) Muscle weakness Current Immunizations  Reviewed on 7/29/2016 Name Date Hib (PRP-T) 6/30/2015 Influenza Vaccine 12/17/2015, 10/22/2015 Influenza Vaccine Split 10/26/2012, 10/7/2011 Not reviewed this visit You Were Diagnosed With   
  
 Codes Comments Iron deficiency anemia due to chronic blood loss    -  Primary ICD-10-CM: D50.0 ICD-9-CM: 280.0  Chronic obstructive pulmonary disease, unspecified [facility] ICD-10-CM: J44.9 ICD-9-CM: 368 AVM (arteriovenous malformation) of colon with hemorrhage     ICD-10-CM: Q27.33 
ICD-9-CM: 747.61 Vitals BP Pulse Temp Resp Height(growth percentile) Weight(growth percentile) 127/69 (BP 1 Location: Left arm, BP Patient Position: Sitting) 65 97.9 °F (36.6 °C) (Temporal) 20 5' 4\" (1.626 m) 227 lb (103 kg) LMP SpO2 BMI OB Status Smoking Status 01/01/1996 98% 38.96 kg/m2 Hysterectomy Former Smoker Vitals History BMI and BSA Data Body Mass Index Body Surface Area  
 38.96 kg/m 2 2.16 m 2 Preferred Pharmacy Pharmacy Name Phone [address] [facility] [phone] Your Updated Medication List  
  
   
This list is accurate as of: 1/31/18  1:55 PM.  Always use your most recent med list.  
  
  
  
  
 * albuterol 90 mcg/actuation inhaler Commonly known as:  VENTOLIN HFA Take 2 Puffs by inhalation every six (6) hours as needed for Wheezing or Shortness of Breath (may also use for cough). * albuterol 2.5 mg /3 mL (0.083 %) nebulizer solution Commonly known as:  PROVENTIL VENTOLIN  
3 mL by Nebulization route every four (4) hours as needed for Wheezing. ALPRAZolam 0.5 mg tablet Commonly known as:  Ava Hanks Take 1 Tab by mouth two (2) times daily as needed for Anxiety. Max Daily Amount: 1 mg.  
  
 atorvastatin 10 mg tablet Commonly known as:  LIPITOR  
TAKE 1 TABLET DAILY DULERA 200-5 mcg/actuation HFA inhaler Generic drug:  mometasone-formoterol USE 2 INHALATIONS TWICE A DAY  
  
 hydrocortisone 2.5 % topical cream  
Commonly known as:  HYTONE Apply  to affected area two (2) times a day. use thin layer  
  
 ibuprofen 800 mg tablet Commonly known as:  MOTRIN Take 1 Tab by mouth every eight (8) hours as needed for Pain. * ipratropium 17 mcg/actuation inhaler Commonly known as:  ATROVENT HFA  
 Take 1 Puff by inhalation every six (6) hours as needed for Wheezing. * ipratropium 0.02 % Soln Commonly known as:  ATROVENT  
2.5 mL by Nebulization route as needed for Wheezing (every 6 hours as needed). lansoprazole 30 mg capsule Commonly known as:  PREVACID TAKE 1 CAPSULE DAILY BEFORE BREAKFAST  
  
 levocetirizine 5 mg tablet Commonly known as:  Areatha Conch TAKE 1 TABLET DAILY losartan 25 mg tablet Commonly known as:  COZAAR  
TAKE 1 TABLET DAILY  
  
 metoprolol succinate 25 mg XL tablet Commonly known as:  TOPROL-XL  
TAKE 1 TABLET DAILY  
  
 montelukast 10 mg tablet Commonly known as:  SINGULAIR  
TAKE 1 TABLET DAILY Nebulizer & Compressor machine Use as directed PARoxetine 20 mg tablet Commonly known as:  PAXIL Take 1 Tab by mouth daily. potassium chloride 20 mEq tablet Commonly known as:  K-DUR, KLOR-CON  
TAKE 1 TABLET THREE TIMES A DAY  
  
 sodium bicarb-sodium chloride Kit Commonly known as:  NEILMED SINUS RINSE COMPLETE  
1 Packet by Nasal route daily. triamterene-hydroCHLOROthiazide 37.5-25 mg per tablet Commonly known as:  Orest Albert Lea TAKE 1 TABLET DAILY * Notice: This list has 4 medication(s) that are the same as other medications prescribed for you. Read the directions carefully, and ask your doctor or other care provider to review them with you. We Performed the Following CBC WITH AUTOMATED DIFF [66869 CPT(R)] FERRITIN [22065 CPT(R)] IRON PROFILE [id number] CPT(R)] Follow-up Instructions Return in about 3 months (around 4/30/2018) for Jorge Alberto iron deficiency fu. Introducing [facility]! Tatum Rehman introduces MobiPixie patient portal. Now you can access parts of your medical record, email your doctor's office, and request medication refills online. 1. In your internet browser, go to [email or web address] 2. Click on the First Time User? Click Here link in the Sign In box. You will see the New Member Sign Up page. 3. Enter your Ocision Access Code exactly as it appears below. You will not need to use this code after youve completed the sign-up process. If you do not sign up before the expiration date, you must request a new code. · Ocision Access Code: [email or web address] Expires: 5/1/2018  1:55 PM 
 
4. Enter the last four digits of your Social Security Number (xxxx) and Date of Birth (mm/dd/yyyy) as indicated and click Submit. You will be taken to the next sign-up page. 5. Create a Ocision ID. This will be your Ocision login ID and cannot be changed, so think of one that is secure and easy to remember. 6. Create a Ocision password. You can change your password at any time. 7. Enter your Password Reset Question and Answer. This can be used at a later time if you forget your password. 8. Enter your e-mail address. You will receive e-mail notification when new information is available in [address] 9. Click Sign Up. You can now view and download portions of your medical record. 10. Click the Download Summary menu link to download a portable copy of your medical information. If you have questions, please visit the Frequently Asked Questions section of the Ocision website. Remember, Ocision is NOT to be used for urgent needs. For medical emergencies, dial 911. Now available from your iPhone and Android! Please provide this summary of care documentation to your next provider. Your primary care clinician is listed as Aquiles Colmenares. If you have any questions after today's visit, please call [phone].

## 2018-02-15 ENCOUNTER — HOSPITAL ENCOUNTER (OUTPATIENT)
Dept: INFUSION THERAPY | Age: 68
Discharge: HOME | End: 2018-02-15
Payer: COMMERCIAL

## 2018-02-15 VITALS
DIASTOLIC BLOOD PRESSURE: 70 MMHG | TEMPERATURE: 98.1 F | HEART RATE: 62 BPM | SYSTOLIC BLOOD PRESSURE: 111 MMHG | RESPIRATION RATE: 16 BRPM

## 2018-02-15 PROCEDURE — 74011250636 HC RX REV CODE- 250/636: Performed by: INTERNAL MEDICINE

## 2018-02-15 RX ADMIN — FERRIC CARBOXYMALTOSE INJECTION 750 MG: 50 INJECTION, SOLUTION INTRAVENOUS at 15:25

## 2018-02-15 NOTE — PROGRESS NOTES
[facility] VISIT NOTE    Pt arrived at [facility] ambulatory and in no distress for 1/2 Injectofer. Assessment completed, pt had no complaints. Patient Vitals for the past 12 hrs:   Temp Pulse Resp BP   02/15/18 1634 98.1 °F (36.7 °C) 62 16 111/70   02/15/18 1555 97.7 °F (36.5 °C) 71 18 115/65   02/15/18 1401 97.4 °F (36.3 °C) 84 18 117/69     PIV accessed in right forearm. Medications received: Injectofer IV    Tolerated treatment well, no adverse reaction noted. Pt remained for 30 minutes post infusion. PIV de-accessed, 2x2 and band-aid applied. D/C'd from [facility] ambulatory and in no distress accompanied by  . Next appointment is 2/22/18 at 2:00.

## 2018-02-15 NOTE — PROGRESS NOTES
Problem: Anemia Care Plan (Adult and Pediatric)  Goal: *Tolerates increased activity  Outcome: Progressing Towards Goal  Pt h;ere for Injectofer.

## 2018-02-21 DIAGNOSIS — I10 ESSENTIAL HYPERTENSION WITH GOAL BLOOD PRESSURE LESS THAN 130/80: ICD-10-CM

## 2018-02-22 ENCOUNTER — HOSPITAL ENCOUNTER (OUTPATIENT)
Dept: INFUSION THERAPY | Age: 68
Discharge: HOME | End: 2018-02-22
Payer: COMMERCIAL

## 2018-02-22 RX ORDER — LOSARTAN POTASSIUM 25 MG/1
TABLET ORAL
Qty: 90 TAB | Refills: 1 | Status: SHIPPED | OUTPATIENT
Start: 2018-02-22 | End: 2018-08-23 | Stop reason: SDUPTHER

## 2018-03-01 ENCOUNTER — HOSPITAL ENCOUNTER (OUTPATIENT)
Dept: INFUSION THERAPY | Age: 68
Discharge: HOME | End: 2018-03-01
Payer: COMMERCIAL

## 2018-03-01 VITALS
RESPIRATION RATE: 16 BRPM | DIASTOLIC BLOOD PRESSURE: 80 MMHG | TEMPERATURE: 97.8 F | HEART RATE: 53 BPM | SYSTOLIC BLOOD PRESSURE: 127 MMHG

## 2018-03-01 PROCEDURE — 96374 THER/PROPH/DIAG INJ IV PUSH: CPT

## 2018-03-01 PROCEDURE — 74011250636 HC RX REV CODE- 250/636: Performed by: INTERNAL MEDICINE

## 2018-03-01 RX ORDER — SODIUM CHLORIDE 0.9 % (FLUSH) 0.9 %
10 SYRINGE (ML) INJECTION AS NEEDED
Status: ACTIVE | OUTPATIENT
Start: 2018-03-01 | End: 2018-03-02

## 2018-03-01 RX ADMIN — FERRIC CARBOXYMALTOSE INJECTION 750 MG: 50 INJECTION, SOLUTION INTRAVENOUS at 14:55

## 2018-03-01 RX ADMIN — Medication 10 ML: at 14:20

## 2018-03-01 NOTE — PROGRESS NOTES
[facility] VISIT NOTE    1400  Pt arrived at [facility] ambulatory and in no distress for Injectafer (dose 2/2). Assessment completed, pt c/o unchanged fatigue and dyspnea with exertion. Blood pressure 127/80, pulse (!) 53, temperature 97.8 °F (36.6 °C), resp. rate 16, last menstrual period 01/01/1996, not currently breastfeeding. 24g PIV placed to right arm without difficulty. Positive blood return noted. Medications received: Injectafer IV    Patient remained in [facility] for 30 minutes post infusion for observation. Tolerated treatment well, no adverse reaction noted. PIV flushed and removed per protocol. Patient Vitals for the past 12 hrs:   Temp Pulse Resp BP   03/01/18 1540 97.8 °F (36.6 °C) (!) 53 16 127/80   03/01/18 1510 97.8 °F (36.6 °C) (!) 50 18 117/70   03/01/18 1406 98.1 °F (36.7 °C) (!) 59 16 121/74       1545  D/C'd from [facility] ambulatory and in no distress accompanied by .

## 2018-03-05 RX ORDER — POTASSIUM CHLORIDE 20 MEQ/1
TABLET, EXTENDED RELEASE ORAL
Qty: 270 TAB | Refills: 1 | Status: SHIPPED | OUTPATIENT
Start: 2018-03-05 | End: 2018-09-01 | Stop reason: SDUPTHER

## 2018-03-20 RX ORDER — ATORVASTATIN CALCIUM 10 MG/1
TABLET, FILM COATED ORAL
Qty: 90 TAB | Refills: 1 | Status: SHIPPED | OUTPATIENT
Start: 2018-03-20 | End: 2018-09-16 | Stop reason: SDUPTHER

## 2018-04-08 ENCOUNTER — APPOINTMENT (OUTPATIENT)
Dept: GENERAL RADIOLOGY | Age: 68
End: 2018-04-08
Attending: PHYSICIAN ASSISTANT
Payer: COMMERCIAL

## 2018-04-08 ENCOUNTER — HOSPITAL ENCOUNTER (EMERGENCY)
Age: 68
Discharge: HOME | End: 2018-04-08
Attending: EMERGENCY MEDICINE
Payer: COMMERCIAL

## 2018-04-08 VITALS
HEART RATE: 58 BPM | WEIGHT: 227 LBS | OXYGEN SATURATION: 96 % | TEMPERATURE: 97.4 F | DIASTOLIC BLOOD PRESSURE: 78 MMHG | HEIGHT: 64 IN | SYSTOLIC BLOOD PRESSURE: 143 MMHG | RESPIRATION RATE: 16 BRPM | BODY MASS INDEX: 38.76 KG/M2

## 2018-04-08 DIAGNOSIS — S99.921A RIGHT FOOT INJURY, INITIAL ENCOUNTER: Primary | ICD-10-CM
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
## 2018-04-08 DIAGNOSIS — S80.211A ABRASION, RIGHT KNEE, INITIAL ENCOUNTER: ICD-10-CM

## 2018-04-08 PROCEDURE — 90715 TDAP VACCINE 7 YRS/> IM: CPT | Performed by: PHYSICIAN ASSISTANT

## 2018-04-08 PROCEDURE — 99283 EMERGENCY DEPT VISIT LOW MDM: CPT

## 2018-04-08 PROCEDURE — 90471 IMMUNIZATION ADMIN: CPT

## 2018-04-08 PROCEDURE — 73630 X-RAY EXAM OF FOOT: CPT

## 2018-04-08 PROCEDURE — 74011250636 HC RX REV CODE- 250/636: Performed by: PHYSICIAN ASSISTANT

## 2018-04-08 PROCEDURE — 77030036687 HC SHOE PSTOP S2SG -A

## 2018-04-08 PROCEDURE — 74011250637 HC RX REV CODE- 250/637: Performed by: PHYSICIAN ASSISTANT

## 2018-04-08 RX ORDER — NAPROXEN 500 MG/1
500 TABLET ORAL
Qty: 20 TAB | Refills: 0 | Status: SHIPPED | OUTPATIENT
Start: 2018-04-08 | End: 2018-05-17

## 2018-04-08 RX ORDER — NAPROXEN 250 MG/1
500 TABLET ORAL
Status: COMPLETED | OUTPATIENT
Start: 2018-04-08 | End: 2018-04-08

## 2018-04-08 RX ADMIN — TETANUS TOXOID, REDUCED DIPHTHERIA TOXOID AND ACELLULAR PERTUSSIS VACCINE, ADSORBED 0.5 ML: 5; 2.5; 8; 8; 2.5 SUSPENSION INTRAMUSCULAR at 15:59

## 2018-04-08 RX ADMIN — NAPROXEN 500 MG: 250 TABLET ORAL at 15:44

## 2018-04-08 NOTE — ED TRIAGE NOTES
Pain in top and bottom of right foot when she fell forward tripped down a step she didn't see yesterday. Painful to walk on foot.

## 2018-04-08 NOTE — ED PROVIDER NOTES
HPI Comments: 74 y.o. female with past medical history significant for hypercholesterolemia, HTN, obesity, GERD, anemia, COPD, vertigo who presents from home via private vehicle with chief complaint of foot pain. Pt reports that she was walking outside last night on wet, uneven ground and slipped and fell. Pt skinned her L knee and hurt her R foot. Denies hitting her head or LOC. Pt applied ice and took tylenol without significant relief. Pt is unsure of her last tetanus shot. Her foot pain is worse with ambulation, movement and palpation. She specifically denies any fevers, chills, nausea, vomiting, chest pain, abd pain, urinary sx, shortness of breath, headache, rash, diarrhea, sweating or weight loss. There are no other acute medical concerns at this time. Social hx: former tobacco smoker (quit 1988), +rare EtOH    PCP: Mary Alice Bartholomew NP    Note written by Becca Chapman, as dictated by KIMBERLEE Gresham 3:30 PM        The history is provided by the patient. No  was used. Past Medical History:   Diagnosis Date    Anemia     COPD (chronic obstructive pulmonary disease) (HCC)     GERD (gastroesophageal reflux disease)     Hypercholesterolemia     Hypertension     Obesity (BMI 30-39. 9)     Vertigo     Vestibulopathy 7/27/2016       Past Surgical History:   Procedure Laterality Date    HX COLONOSCOPY  10/25/2011    1 polyp which was removed, diverticulosis in distal descending colon & sigmoid colon.  HX ENDOSCOPY  04/2014    HX GYN  1996    hysterectomy    HX OTHER SURGICAL  2006? L Lower Abd. Basal Cell CA         Family History:   Problem Relation Age of Onset    Hypertension Mother     Stroke Maternal Grandmother     Cancer Paternal Grandmother      breast cancer       Social History     Social History    Marital status:      Spouse name: N/A    Number of children: N/A    Years of education: N/A     Occupational History    Not on file. Social History Main Topics    Smoking status: Former Smoker     Packs/day: 1.00     Years: 18.00     Types: Cigarettes     Quit date: 7/24/1988    Smokeless tobacco: Never Used      Comment: Quit in 1988    Alcohol use Yes      Comment: rarely    Drug use: No    Sexual activity: Yes     Partners: Male     Birth control/ protection: Surgical      Comment: Hysterectomy     Other Topics Concern    Not on file     Social History Narrative         ALLERGIES: Loratadine    Review of Systems   Constitutional: Negative for chills and fever. HENT: Negative for congestion, ear pain, rhinorrhea, sneezing and sore throat. Eyes: Negative for photophobia, pain, redness and visual disturbance. Respiratory: Negative for chest tightness and shortness of breath. Cardiovascular: Negative for chest pain and leg swelling. Gastrointestinal: Negative for abdominal pain, nausea and vomiting. Genitourinary: Negative for difficulty urinating, dysuria, flank pain and frequency. Musculoskeletal: Positive for arthralgias, gait problem and myalgias. Negative for back pain, neck pain and neck stiffness. Skin: Negative for rash and wound. Neurological: Negative for dizziness, syncope, weakness, light-headedness and headaches. Hematological: Negative for adenopathy. Vitals:    04/08/18 1517   BP: 143/78   Pulse: (!) 58   Resp: 16   Temp: 97.4 °F (36.3 °C)   SpO2: 96%   Weight: 103 kg (227 lb)   Height: 5' 4\" (1.626 m)            Physical Exam   Constitutional: She is oriented to person, place, and time. She appears well-developed and well-nourished. No distress. HENT:   Head: Normocephalic and atraumatic. Right Ear: External ear normal.   Left Ear: External ear normal.   Eyes: EOM are normal. Pupils are equal, round, and reactive to light. Neck: Neck supple. Cardiovascular: Normal rate, regular rhythm, normal heart sounds and intact distal pulses. Exam reveals no gallop and no friction rub.     No murmur heard. Pulmonary/Chest: Effort normal and breath sounds normal. No stridor. No respiratory distress. She has no wheezes. She has no rales. She exhibits no tenderness. Abdominal: Soft. Bowel sounds are normal. She exhibits no distension and no mass. There is no tenderness. There is no rebound and no guarding. Musculoskeletal: She exhibits edema and tenderness. She exhibits no deformity. Right great toe, mtp and distal metatarsal ttp with STS and ecchymosis. No deformity, no lesions. Normothermic. Cap refill brisk. Distal n/v intact. Ambulates without difficulty  Silver colored toe ring to right second toe. +swelling noted to toe, toe non tender and dnv intact. Well healing abrasion to left knee without evidence of infection   Neurological: She is alert and oriented to person, place, and time. No cranial nerve deficit. Coordination normal.   Skin: No rash noted. No erythema. No pallor. Psychiatric: She has a normal mood and affect. Her behavior is normal.   Nursing note and vitals reviewed. MDM  Number of Diagnoses or Management Options  Abrasion, right knee, initial encounter:   Fall, initial encounter:   Right foot injury, initial encounter:      Amount and/or Complexity of Data Reviewed  Tests in the radiology section of CPT®: ordered and reviewed  Obtain history from someone other than the patient: yes ()  Review and summarize past medical records: yes  Independent visualization of images, tracings, or specimens: yes    Patient Progress  Patient progress: stable        ED Course       Procedures    3:35 PM  Discussed pt, sx, hx and current findings with Dr Omi Sauceda. He is in agreement with plan and will see pt. Will remove ring. Update tdap and get xray   Sampson Ny PA-C      4:31 PM   Toe ring removed by nurse  Sampson Ny PA-C    4:32 PM   updated pt and family on current neg xray findings, no fracture, will victor manuel tape, ace wrap and post op shoe. Sampson Ny LUIS    LABORATORY TESTS:  No results found for this or any previous visit (from the past 12 hour(s)). IMAGING RESULTS:    Xr Foot Rt Min 3 V    Result Date: 4/8/2018  EXAM:  XR FOOT RT MIN 3 V INDICATION:   Right foot pain since trip and fall injury one day ago. COMPARISON:  None. FINDINGS:  Three views of the right foot demonstrate no fracture or other acute osseous or articular abnormality. The soft tissues are within normal limits. Mild first MTP joint osteoarthritis. Plantar calcaneal spur. Achilles insertional enthesophyte. IMPRESSION:  No acute abnormality. MEDICATIONS GIVEN:  Medications   diph,Pertuss(AC),Tet Vac-PF (BOOSTRIX) suspension 0.5 mL (0.5 mL IntraMUSCular Given 4/8/18 6463)   naproxen (NAPROSYN) tablet 500 mg (500 mg Oral Given 4/8/18 9400)       IMPRESSION:  1. Right foot injury, initial encounter    2. Abrasion, right knee, initial encounter    3. Fall, initial encounter        PLAN:  1.    Discharge Medication List as of 4/8/2018  4:33 PM      START taking these medications    Details   naproxen (NAPROSYN) 500 mg tablet Take 1 Tab by mouth every twelve (12) hours as needed for Pain., Print, Disp-20 Tab, R-0         CONTINUE these medications which have NOT CHANGED    Details   atorvastatin (LIPITOR) 10 mg tablet TAKE 1 TABLET DAILY, Normal, Disp-90 Tab, R-1      PARoxetine (PAXIL) 20 mg tablet TAKE 1 TABLET BY MOUTH DAILY, Normal, Disp-30 Tab, R-5      potassium chloride (K-DUR, KLOR-CON) 20 mEq tablet TAKE 1 TABLET THREE TIMES A DAY, Normal, Disp-270 Tab, R-1      losartan (COZAAR) 25 mg tablet TAKE 1 TABLET DAILY, Normal, Disp-90 Tab, R-1      DULERA 200-5 mcg/actuation HFA inhaler USE 2 INHALATIONS TWICE A DAY, Normal, Disp-3 Inhaler, R-2      lansoprazole (PREVACID) 30 mg capsule TAKE 1 CAPSULE DAILY BEFORE BREAKFAST, Normal, Disp-90 Cap, R-1      montelukast (SINGULAIR) 10 mg tablet TAKE 1 TABLET DAILY, Normal, Disp-90 Tab, R-1      metoprolol succinate (TOPROL-XL) 25 mg XL tablet TAKE 1 TABLET DAILY, Normal, Disp-90 Tab, R-1      levocetirizine (XYZAL) 5 mg tablet TAKE 1 TABLET DAILY, Normal, Disp-90 Tab, R-1      triamterene-hydroCHLOROthiazide (MAXZIDE) 37.5-25 mg per tablet TAKE 1 TABLET DAILY, Normal, Disp-90 Tab, R-1      ALPRAZolam (XANAX) 0.5 mg tablet Take 1 Tab by mouth two (2) times daily as needed for Anxiety. Max Daily Amount: 1 mg., Print, Disp-30 Tab, R-1      albuterol (PROVENTIL VENTOLIN) 2.5 mg /3 mL (0.083 %) nebulizer solution 3 mL by Nebulization route every four (4) hours as needed for Wheezing., Normal, Disp-24 Each, R-1      Nebulizer & Compressor machine Use as directed, PrintNebulizer and suppliesDisp-1 Each, R-0      ipratropium (ATROVENT HFA) 17 mcg/actuation inhaler Take 1 Puff by inhalation every six (6) hours as needed for Wheezing., No Print, Disp-1 Inhaler, R-3      ipratropium (ATROVENT) 0.02 % nebulizer solution 2.5 mL by Nebulization route as needed for Wheezing (every 6 hours as needed). , Normal, Disp-62.5 mL, R-0      hydrocortisone (HYTONE) 2.5 % topical cream Apply  to affected area two (2) times a day. use thin layer, Normal, Disp-30 g, R-0      sodium bicarb-sodium chloride (NEILMED SINUS RINSE COMPLETE) kit 1 Packet by Nasal route daily. , Normal, Disp-1 Kit, R-0      albuterol (VENTOLIN HFA) 90 mcg/actuation inhaler Take 2 Puffs by inhalation every six (6) hours as needed for Wheezing or Shortness of Breath (may also use for cough). , Normal, Disp-1 Inhaler, R-3         STOP taking these medications       ibuprofen (MOTRIN) 800 mg tablet Comments:   Reason for Stoppin.   Follow-up Information     Follow up With Details Comments [address] [facility], NP Schedule an appointment as soon as possible for a visit 2-4 days for recheck [address] [phone]          Return to ED if worse         4:34 PM  Pt has been reexamined. Pt has no new complaints, changes or physical findings. Care plan outlined and precautions discussed. All available results were reviewed with pt. All medications were reviewed with pt. All of pt's questions and concerns were addressed. Pt agrees to F/U as instructed and agrees to return to ED upon further deterioration. Pt is ready to go home.   KIMBERLEE Mei

## 2018-04-08 NOTE — DISCHARGE INSTRUCTIONS
Preventing Falls: Care Instructions  Your Care Instructions    Getting around your home safely can be a challenge if you have injuries or health problems that make it easy for you to fall. Loose rugs and furniture in walkways are among the dangers for many older people who have problems walking or who have poor eyesight. People who have conditions such as arthritis, osteoporosis, or dementia also have to be careful not to fall. You can make your home safer with a few simple measures. Follow-up care is a key part of your treatment and safety. Be sure to make and go to all appointments, and call your doctor if you are having problems. It's also a good idea to know your test results and keep a list of the medicines you take. How can you care for yourself at home? Taking care of yourself  · You may get dizzy if you do not drink enough water. To prevent dehydration, drink plenty of fluids, enough so that your urine is light yellow or clear like water. Choose water and other caffeine-free clear liquids. If you have kidney, heart, or liver disease and have to limit fluids, talk with your doctor before you increase the amount of fluids you drink. · Exercise regularly to improve your strength, muscle tone, and balance. Walk if you can. Swimming may be a good choice if you cannot walk easily. · Have your vision and hearing checked each year or any time you notice a change. If you have trouble seeing and hearing, you might not be able to avoid objects and could lose your balance. · Know the side effects of the medicines you take. Ask your doctor or pharmacist whether the medicines you take can affect your balance. Sleeping pills or sedatives can affect your balance. · Limit the amount of alcohol you drink. Alcohol can impair your balance and other senses. · Ask your doctor whether calluses or corns on your feet need to be removed.  If you wear loose-fitting shoes because of calluses or corns, you can lose your balance and fall. · Talk to your doctor if you have numbness in your feet. Preventing falls at home  · Remove raised doorway thresholds, throw rugs, and clutter. Repair loose carpet or raised areas in the floor. · Move furniture and electrical cords to keep them out of walking paths. · Use nonskid floor wax, and wipe up spills right away, especially on ceramic tile floors. · If you use a walker or cane, put rubber tips on it. If you use crutches, clean the bottoms of them regularly with an abrasive pad, such as steel wool. · Keep your house well lit, especially Fred[place]ick Spencer, and outside walkways. Use night-lights in areas such as hallways and bathrooms. Add extra light switches or use remote switches (such as switches that go on or off when you clap your hands) to make it easier to turn lights on if you have to get up during the night. · Install sturdy handrails on stairways. · Move items in your cabinets so that the things you use a lot are on the lower shelves (about waist level). · Keep a cordless phone and a flashlight with new batteries by your bed. If possible, put a phone in each of the main rooms of your house, or carry a cell phone in case you fall and cannot reach a phone. Or, you can wear a device around your neck or wrist. You push a button that sends a signal for help. · Wear low-heeled shoes that fit well and give your feet good support. Use footwear with nonskid soles. Check the heels and soles of your shoes for wear. Repair or replace worn heels or soles. · Do not wear socks without shoes on wood floors. · Walk on the grass when the sidewalks are slippery. If you live in an area that gets snow and ice in the winter, sprinkle salt on slippery steps and sidewalks. Preventing falls in the bath  · Install grab bars and nonskid mats inside and outside your shower or tub and near the toilet and sinks. · Use shower chairs and bath benches.   · Use a hand-held shower head that will allow you to sit while showering. · Get into a tub or shower by putting the weaker leg in first. Get out of a tub or shower with your strong side first.  · Repair loose toilet seats and consider installing a raised toilet seat to make getting on and off the toilet easier. · Keep your bathroom door unlocked while you are in the shower. Where can you learn more? Go to http://negrita-zuleyka.info/. Enter 0476 [id number] 69 [id number] in the search box to learn more about \"Preventing Falls: Care Instructions. \"  Current as of: May 12, 2017  Content Version: 11.4  © 0770-2006 [employer]. Care instructions adapted under license by [employer] (which disclaims liability or warranty for this information). If you have questions about a medical condition or this instruction, always ask your healthcare professional. [place] [address] any warranty or liability for your use of this information. Foot Pain: Care Instructions  Your Care Instructions  Foot injuries that cause pain and swelling are fairly common. Almost all sports or home repair projects can cause a misstep that ends up as foot pain. Normal wear and tear, especially as you get older, also can cause foot pain. Most minor foot injuries will heal on their own, and home treatment is usually all you need to do. If you have a severe injury, you may need tests and treatment. Follow-up care is a key part of your treatment and safety. Be sure to make and go to all appointments, and call your doctor if you are having problems. It's also a good idea to know your test results and keep a list of the medicines you take. How can you care for yourself at home? · Take pain medicines exactly as directed. ¨ If the doctor gave you a prescription medicine for pain, take it as prescribed. ¨ If you are not taking a prescription pain medicine, ask your doctor if you can take an over-the-counter medicine. · Rest and protect your foot.  Take a break from any activity that may cause pain. · Put ice or a cold pack on your foot for 10 to 20 minutes at a time. Put a thin cloth between the ice and your skin. · Prop up the sore foot on a pillow when you ice it or anytime you sit or lie down during the next 3 days. Try to keep it above the level of your heart. This will help reduce swelling. · Your doctor may recommend that you wrap your foot with an elastic bandage. Keep your foot wrapped for as long as your doctor advises. · If your doctor recommends crutches, use them as directed. · Wear roomy footwear. · As soon as pain and swelling end, begin gentle exercises of your foot. Your doctor can tell you which exercises will help. When should you call for help? Call 911 anytime you think you may need emergency care. For example, call if:  ? · Your foot turns pale, white, blue, or cold. ?Call your doctor now or seek immediate medical care if:  ? · You cannot move or stand on your foot. ? · Your foot looks twisted or out of its normal position. ? · Your foot is not stable when you step down. ? · You have signs of infection, such as:  ¨ Increased pain, swelling, warmth, or redness. ¨ Red streaks leading from the sore area. ¨ Pus draining from a place on your foot. ¨ A fever. ? · Your foot is numb or tingly. ? Watch closely for changes in your health, and be sure to contact your doctor if:  ? · You do not get better as expected. ? · You have bruises from an injury that last longer than 2 weeks. Where can you learn more? Go to http://negrita-zuleyka.info/. Enter E728 in the search box to learn more about \"Foot Pain: Care Instructions. \"  Current as of: March 21, 2017  Content Version: 11.4  © 7472-9121 [employer]. Care instructions adapted under license by [employer] (which disclaims liability or warranty for this information).  If you have questions about a medical condition or this instruction, always ask your healthcare professional. [address] any warranty or liability for your use of this information. We hope that we have addressed all of your medical concerns. The examination and treatment you received in the Emergency Department were for an emergent problem and were not intended as complete care. It is important that you follow up with your healthcare provider(s) for ongoing care. If your symptoms worsen or do not improve as expected, and you are unable to reach your usual health care provider(s), you should return to the Emergency Department. Today's healthcare is undergoing tremendous change, and patient satisfaction surveys are one of the many tools to assess the quality of medical care. You may receive a survey from the [employer] regarding your experience in the Emergency Department. I hope that your experience has been completely positive, particularly the medical care that I provided. As such, please participate in the survey; anything less than excellent does not meet my expectations or intentions. [address] [place] [address] participate in nationally recognized quality of care measures. If your blood pressure is greater than 120/80, as reported below, we urge that you seek medical care to address the potential of high blood pressure, commonly known as hypertension. Hypertension can be hereditary or can be caused by certain medical conditions, pain, stress, or \"white coat syndrome. \"       Please make an appointment with your health care provider(s) for follow up of your Emergency Department visit. VITALS:   Patient Vitals for the past 8 hrs:   Temp Pulse Resp BP SpO2   04/08/18 1517 97.4 °F (36.3 °C) (!) 58 16 143/78 96 %          Thank you for allowing us to provide you with medical care today. We realize that you have many choices for your emergency care needs.   Please choose us in the future for any continued health care needs. David Ny, [address]: [phone]            No results found for this or any previous visit (from the past 24 hour(s)). Xr Foot Rt Min 3 V    Result Date: 4/8/2018  EXAM:  XR FOOT RT MIN 3 V INDICATION:   Right foot pain since trip and fall injury one day ago. COMPARISON:  None. FINDINGS:  Three views of the right foot demonstrate no fracture or other acute osseous or articular abnormality. The soft tissues are within normal limits. Mild first MTP joint osteoarthritis. Plantar calcaneal spur. Achilles insertional enthesophyte. IMPRESSION:  No acute abnormality.

## 2018-04-08 NOTE — LETTER
[address] 
[facility] EMERGENCY DEPT 
[address] [facility] [address] [facility] [id number] [phone] Work/School Note Date: 4/8/2018 To Whom It May concern: 
 
Daniel Rodriguez was seen and treated today in the emergency room by the following provider(s): 
Attending Provider: Judah Singer MD 
Physician Assistant: KIMBERLEE Mckeon. Daniel Rodriguez may return to work on 4/11/18.  
 
Sincerely, 
 
 
 
 
KIMBERLEE Mckeon

## 2018-04-15 DIAGNOSIS — I10 ESSENTIAL HYPERTENSION WITH GOAL BLOOD PRESSURE LESS THAN 130/80: ICD-10-CM

## 2018-04-16 RX ORDER — TRIAMTERENE/HYDROCHLOROTHIAZID 37.5-25 MG
TABLET ORAL
Qty: 90 TAB | Refills: 1 | Status: SHIPPED | OUTPATIENT
Start: 2018-04-16 | End: 2019-07-17 | Stop reason: SDUPTHER

## 2018-04-24 ENCOUNTER — TELEPHONE (OUTPATIENT)
Dept: ONCOLOGY | Age: 68
End: 2018-04-24

## 2018-04-24 NOTE — TELEPHONE ENCOUNTER
[facility] [employer] [facility]  [phone]    04/24/18- Phone call placed to pt to remind pt to have labs drawn prior to /her follow up appointment with .  left for patient.

## 2018-04-26 LAB
BASOPHILS # BLD AUTO: 0.1 X10E3/UL (ref 0–0.2)
BASOPHILS NFR BLD AUTO: 1 %
EOSINOPHIL # BLD AUTO: 0.3 X10E3/UL (ref 0–0.4)
EOSINOPHIL NFR BLD AUTO: 3 %
ERYTHROCYTE [DISTWIDTH] IN BLOOD BY AUTOMATED COUNT: 19.6 % (ref 12.3–15.4)
FERRITIN SERPL-MCNC: 72 NG/ML (ref 15–150)
HCT VFR BLD AUTO: 45.2 % (ref 34–46.6)
HGB BLD-MCNC: 15.6 G/DL (ref 11.1–15.9)
IMM GRANULOCYTES # BLD: 0 X10E3/UL (ref 0–0.1)
IMM GRANULOCYTES NFR BLD: 0 %
IRON SATN MFR SERPL: 28 % (ref 15–55)
IRON SERPL-MCNC: 90 UG/DL (ref 27–139)
LYMPHOCYTES # BLD AUTO: 1.8 X10E3/UL (ref 0.7–3.1)
LYMPHOCYTES NFR BLD AUTO: 19 %
MCH RBC QN AUTO: 28.6 PG (ref 26.6–33)
MCHC RBC AUTO-ENTMCNC: 34.5 G/DL (ref 31.5–35.7)
MCV RBC AUTO: 83 FL (ref 79–97)
MONOCYTES # BLD AUTO: 0.9 X10E3/UL (ref 0.1–0.9)
MONOCYTES NFR BLD AUTO: 10 %
NEUTROPHILS # BLD AUTO: 6.2 X10E3/UL (ref 1.4–7)
NEUTROPHILS NFR BLD AUTO: 67 %
PLATELET # BLD AUTO: 208 X10E3/UL (ref 150–379)
RBC # BLD AUTO: 5.46 X10E6/UL (ref 3.77–5.28)
TIBC SERPL-MCNC: 324 UG/DL (ref 250–450)
UIBC SERPL-MCNC: 234 UG/DL (ref 118–369)
WBC # BLD AUTO: 9.3 X10E3/UL (ref 3.4–10.8)

## 2018-04-28 RX ORDER — METOPROLOL SUCCINATE 25 MG/1
TABLET, EXTENDED RELEASE ORAL
Qty: 90 TAB | Refills: 1 | Status: SHIPPED | OUTPATIENT
Start: 2018-04-28 | End: 2018-05-17

## 2018-05-01 ENCOUNTER — OFFICE VISIT (OUTPATIENT)
Dept: ONCOLOGY | Age: 68
End: 2018-05-01

## 2018-05-01 VITALS
OXYGEN SATURATION: 97 % | SYSTOLIC BLOOD PRESSURE: 119 MMHG | TEMPERATURE: 95.6 F | RESPIRATION RATE: 18 BRPM | HEART RATE: 65 BPM | WEIGHT: 229 LBS | BODY MASS INDEX: 39.09 KG/M2 | HEIGHT: 64 IN | DIASTOLIC BLOOD PRESSURE: 58 MMHG

## 2018-05-01 DIAGNOSIS — D50.0 IRON DEFICIENCY ANEMIA DUE TO CHRONIC BLOOD LOSS: Primary | ICD-10-CM

## 2018-05-01 DIAGNOSIS — K55.21 AVM (ARTERIOVENOUS MALFORMATION) OF COLON WITH HEMORRHAGE: ICD-10-CM

## 2018-05-01 NOTE — PROGRESS NOTES
[facility] [place] [facility]  [address], [address] [facility]  Yang [phone]  F: [phone]      Reason for Visit:   Ashley Torres is a 79 y.o. female who is seen for follow up of iron deficiency anemia. History of Present Illness:   She tolerated IV iron well, no reactions. Still not taking oral iron, can't tolerate this. Trying to get iron in her diet. Fatigue improved after iron, but not resolved. No bleeding, no melena. PAST HISTORY: The following sections were reviewed and updated in the EMR as appropriate: PMH, SH, FH, Medications, Allergies. She is unaccompanied today. She works part time at an office. She lives in [place] with her , grown son, and her grandson is with them part-time. Allergies   Allergen Reactions    Loratadine Nausea Only and Other (comments)     Muscle weakness      Review of Systems: A complete review of systems was obtained, reviewed, and scanned into the EMR. Pertinent findings reviewed above. Physical Exam:     Visit Vitals    /58 (BP 1 Location: Left arm, BP Patient Position: Sitting)  Comment: .  Pulse 65    Temp 95.6 °F (35.3 °C) (Temporal)    Resp 18    Ht 5' 4\" (1.626 m)    Wt 229 lb (103.9 kg)    LMP 1996    SpO2 97%    BMI 39.31 kg/m2     General: No distress  Eyes: PERRLA, anicteric sclerae  HENT: Atraumatic, OP clear  Neck: Supple  Lymphatic: No cervical, supraclavicular, or inguinal adenopathy  Respiratory: CTAB, normal respiratory effort  CV: Normal rate, regular rhythm, no murmurs, no peripheral edema  GI: Soft, nontender, nondistended, no masses, no hepatomegaly, no splenomegaly  MS: Normal gait and station. Digits without clubbing or cyanosis. Skin: No rashes, ecchymoses, or petechiae. Normal temperature, turgor, and texture.   Psych: Alert, oriented, appropriate affect, normal judgment/insight    Results:     Lab Results   Component Value Date/Time    WBC 9.3 04/25/2018 12:20 PM    HGB 15.6 04/25/2018 12:20 PM    HCT 45.2 04/25/2018 12:20 PM    PLATELET 433 06/78/5854 12:20 PM    MCV 83 04/25/2018 12:20 PM    ABS. NEUTROPHILS 6.2 04/25/2018 12:20 PM    Hemoglobin (POC) 8.8 02/20/2014 11:34 AM    Hemoglobin (POC) 10.2 (L) 10/15/2011 03:12 PM    Hematocrit (POC) 30 (L) 10/15/2011 03:12 PM     Lab Results   Component Value Date/Time    Sodium 141 10/04/2017 09:28 AM    Potassium 3.9 10/04/2017 09:28 AM    Chloride 99 10/04/2017 09:28 AM    CO2 27 10/04/2017 09:28 AM    Glucose 94 10/04/2017 09:28 AM    BUN 11 10/04/2017 09:28 AM    Creatinine 0.82 10/04/2017 09:28 AM    GFR est AA 87 10/04/2017 09:28 AM    GFR est non-AA 75 10/04/2017 09:28 AM    Calcium 9.0 10/04/2017 09:28 AM    Sodium (POC) 141 10/15/2011 03:12 PM    Potassium (POC) 3.5 10/15/2011 03:12 PM    Chloride (POC) 104 10/15/2011 03:12 PM    Glucose (POC) 83 07/26/2016 12:03 PM    Glucose POC 90 12/05/2016 11:08 AM    BUN (POC) 10 10/15/2011 03:12 PM    Creatinine (POC) 1.1 10/15/2011 03:12 PM    Calcium, ionized (POC) 1.11 (L) 10/15/2011 03:12 PM     Lab Results   Component Value Date/Time    Bilirubin, total 0.3 10/04/2017 09:28 AM    AST (SGOT) 20 10/04/2017 09:28 AM    Alk.  phosphatase 64 10/04/2017 09:28 AM    Protein, total 6.8 10/04/2017 09:28 AM    Albumin 4.1 10/04/2017 09:28 AM    Globulin 3.7 07/26/2016 12:36 PM    A-G Ratio 1.5 10/04/2017 09:28 AM       Lab Results   Component Value Date/Time    Reticulocyte count 1.9 02/20/2014 10:50 AM    Iron % saturation 28 04/25/2018 12:20 PM    TIBC 324 04/25/2018 12:20 PM    Ferritin 72 04/25/2018 12:20 PM    Vitamin B12 380 08/21/2012 09:45 AM    Folate 15.5 08/21/2012 09:45 AM    Haptoglobin 101 02/20/2014 10:50 AM     (H) 12/08/2011 02:38 PM    Sed rate (ESR) 5 05/05/2014 10:40 AM    TSH 2.370 10/04/2017 09:28 AM    INR 1.0 10/15/2011 03:07 PM    INR (POC) 1.0 07/26/2016 12:07 PM    Lipase 70 (L) 05/10/2012 12:30 PM     HGB (g/dL)   Date Value 04/25/2018 15.6   01/16/2018 11.1   07/17/2017 12.3   04/17/2017 13.8   01/05/2017 15.1   07/27/2016 13.0   07/26/2016 13.9   07/15/2016 13.7   05/03/2016 9.0 (L)   12/07/2015 12.3   06/03/2015 14.4   03/17/2015 14.9   12/08/2014 16.4 (H)   06/11/2014 15.5   05/05/2014 14.0   04/11/2014 13.4   03/05/2014 9.2 (L)   02/18/2014 8.5 (L)   04/09/2013 12.8   07/16/2012 11.6   05/11/2012 11.9   05/10/2012 13.2   01/19/2012 14.0   12/08/2011 13.6   10/15/2011 8.7 (L)   09/29/2011 8.5 (L)       Ferritin   Date Value   04/25/2018 72 ng/mL   01/16/2018 8 ng/mL (L)   07/17/2017 12 ng/mL (L)   01/05/2017 24 ng/mL   07/15/2016 18 ng/mL   05/03/2016 4 ng/mL (L)   12/07/2015 10 ng/mL (L)   06/03/2015 17 ng/mL   12/08/2014 79 ng/mL   06/11/2014 40 NG/ML   04/11/2014 19 NG/ML   02/20/2014 5 ng/mL (L)   10/07/2011 5 ng/mL (L)       Stool Blood 2/24/2014: negative  Celiac panel 12/8/2014: negative    EGD and Colonscopy with Dr. Blank Toscano 4/9/2014: severe diverticulosis, nonbleeding  Capsule study 4/30/2014: normal    EGD and Colonoscopy with Dr. Soledad Benitez 6/16/2016: large cecum AVM, one polyp (tubular adenoma), otherwise normal.  Capsule endoscopy 6/30/2016: normal    Assessment:   1) Anemia, Iron Deficiency  Recurrent. S/p feraheme in 5/2016 and 7/2016, and injectafer 2/2018. Good response after recent round of IV iron, with HGB rising from 11.1 to 15.6, and iron studies normalizing. She is not able to tolerate oral iron. I encouraged her to focus on an iron rich diet and we will monitor her labs and consider additional IV iron over time, as needed. Check again in 6 months, consider reducing to yearly checks if stable at that time. The source of her iron deficiency remains uncertain. Based on repeat GI evaluation, it may by due to AVMs (she had a large AVM in her cecum that was treated). Celiac panel was negative, and no reason to suggest another malabsorption issue. No evidence of bleeding from elsewhere.     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 Given recurring iron deficiency, we discussed possible follow up with GI, which she will think about. 3) Fatigue  Improved some after IV iron, though not resolved even with HGB of 15, suggesting this is not entirely related.     4) COPD  Follows with pulmonary    Plan:     Iron rich diet  Labs in 6 months: CBC, iron profile, ferritin ([facility])  Return to see me in 6 months      Signed By: Arleth Gomez MD

## 2018-05-17 NOTE — PERIOP NOTES
[address]  Endoscopy Preprocedure Instructions      1. On the day of your surgery, please report to registration located on the 2nd floor of the  [facility]. yes    2. You must have a responsible adult to drive you to the hospital, stay at the hospital during your procedure and drive you home. If they leave your procedure will not be started (no exceptions). yes    3. Do not have anything to eat or drink (including water, gum, mints, coffee, and juice) after midnight. This does not apply to the medications you were instructed to take by your physician. yesIf you are currently taking Plavix, Coumadin, Aspirin, or other blood-thinning agents, contact your physician for special instructions. Not applicable    4. If you are having a procedure that requires bowel prep: We recommend that you have only clear liquids the day before your procedure and begin your bowel prep by 5:00 pm.  You may continue to drink clear liquids until midnight. If for any reason you are not able to complete your prep please notify your physician immediately. yes    5. Have a list of all current medications, including vitamins, herbal supplements and any other over the counter medications. yes    6. If you wear glasses, contacts, dentures and/or hearing aids, they may be removed prior to procedure, please bring a case to store them in. yes and not applicable    7. You should understand that if you do not follow these instructions your procedure may be cancelled. If your physical condition changes (I.e. fever, cold or flu) please contact your doctor as soon as possible. 8. It is important that you be on time.   If for any reason you are unable to keep your appointment please call )- the day of or your physicians office prior to your scheduled procedure

## 2018-05-20 RX ORDER — LEVOCETIRIZINE DIHYDROCHLORIDE 5 MG/1
TABLET, FILM COATED ORAL
Qty: 90 TAB | Refills: 1 | Status: SHIPPED | OUTPATIENT
Start: 2018-05-20 | End: 2018-09-25 | Stop reason: SDUPTHER

## 2018-05-21 NOTE — H&P
Date: 5/10/2018 3:45 PM   Patient Name: Dail Libman. Spencer Rumble   Account #: [id number]    Gender: Female    (age): 1950 (79)       Provider:     Thania Watson. Starla Kemp MD        Referring Physician:     Shaila Simmons MD  [facility] [address] [facility], [address] [place] [address]  [phone] (phone)  [phone] (fax)        Chief Complaint: Iron deficiency anemia           History of Present Illness:   Ms. Phani turner has long-standing palms with iron deficiency anemia. In 2016 she underwent endoscopy, colonoscopy, and pill camera study. The only finding on these tests was an isolated AVM of the right colon which was cauterized during colonoscopy. Despite these efforts she has had ongoing treatment with Dr. Segun Mccarthy in regards for her iron deficiency. Dr. Segun Mccarthy has been expertly keeping her hemoglobin within the normal range, but in order to do so she has required ongoing treatment with both oral and IV iron. This lends support to the theory that there may be ongoing gastrointestinal blood loss. She has no gastrointestinal symptoms whatsoever. Dr. Nikki Quintero asked her to submit to repeat endoscopic examination for the purpose of finding and ablating any AVMs that may be present.? Ms. Phani turner has long-standing palms with iron deficiency anemia. In 2016 she underwent endoscopy, colonoscopy, and pill camera study. The only finding on these tests was an isolated AVM of the right colon which was cauterized during colonoscopy. Despite these efforts she has had ongoing treatment with Dr. Segun Mccarthy in regards for her iron deficiency. Dr. Segun Mccarthy has been expertly keeping her hemoglobin within the normal range, but in order to do so she has required ongoing treatment with both oral and IV iron. This lends support to the theory that there may be ongoing gastrointestinal blood loss. She has no gastrointestinal symptoms whatsoever.   Dr. Nikki Quintero asked her to submit to repeat endoscopic examination for the purpose of finding and ablating any AVMs that may be present. ?       Past Medical History      Medical Conditions: C.O.P.D. High blood pressure   Surgical Procedures: Hysterectomy   Dx Studies: Colonoscopy, 6/16/2016, Angioectasia in the cecum. (Injection, Additional Intervention) and Polyp (4 mm) in the ascending colon. (Polypectomy)  Colonoscopy, 4/9/2014, Severe diverticulosis of the sigmoid colon and Normal mucosa in the terminal ileum and whole colon  Colonoscopy  EGD, 6/16/2016, Normal mucosa in the duodenum. (Biopsy)  EGD, 4/9/2014, Hiatal Hernia in the cardia, Normal mucosa in the esophagus, Normal mucosa in the stomach. (MICHAEL-Test) and Normal mucosa in the duodenum  Endoscopy  M2A, 2014  Pre-Procedure Call, 6/8/2016  Pre-Procedure Call, 3/28/2014   Medications: ergocalciferol (vitamin D2) 50,000 unit  Klor-Con M20 20 mEq  lansoprazole 30 mg 1 Daily  levocetirizine 5 mg  Maxzide-25mg 37.5-25 mg 1 Daily  montelukast 10 mg 1 Daily  Toprol XL 25 mg   Allergies: Unknown allergy medication - Nausea   Immunizations: Flu vaccine, 2017      Social History      Alcohol: Wine. Tobacco: Former smokerCigarettes, quit 1988. Drugs: None   Exercise: None   Caffeine: 3. Coffee or Tea # of cups per day: Ramon Martinez Marital Status:          Occupation:               Family History No history of Colon Polyps, Esophogeal Cancer, GI Cancers, IBD (Crohn's or UC), Liver disease  Grandfather: Diagnosed with Family history of colon cancer;       Review of Systems:   Cardiovascular: Denies chest pain, irregular heart beat, palpitations, peripheral edema, syncope, Sweats. Constitutional: Denies fatigue, fever, loss of appetite, weight gain, weight loss. ENMT: Denies nose bleeds, sore throat, hearing loss. Endocrine: Denies excessive thirst, heat intolerance. Eyes: Denies loss of vision.    Gastrointestinal: Denies abdominal pain, abdominal swelling, change in bowel habits, constipation, diarrhea, Bloating/gas, heartburn, jaundice, nausea, rectal bleeding, stomach cramps, vomiting, dysphagia, rectal pain, Stool incontinence, hematemesis. Genitourinary: Denies dark urine, dysuria, frequent urination, hematuria, incontinence. Hematologic/Lymphatic: Denies easy bruising, prolonged bleeding. Integumentary: Denies itching, rashes, sun sensitivity. Musculoskeletal: Denies arthritis, back pain, gout, joint pain, muscle weakness, stiffness. Neurological: Denies dizziness, fainting, frequent headaches, memory loss. Psychiatric: Denies anxiety, depression, difficulty sleeping, hallucinations, nervousness, panic attacks, paranoia. Respiratory: Denies cough, dyspnea, wheezing. Vital Signs:   BP  (mmHg)  Pulse  (ppm) Rhythm Weight (lbs/oz) Height (ft/in) BMI Resp/min Temp   127/90 84 Regular 229 /  5 / 4 39.3 12 97.9 (F)      Physical Exam:   Constitutional:      Appearance: No distress, appears comfortable. Communication: Understands/receives spoken information. Skin:      Inspection: No rash, no jaundice. Palpation: No subcutaneous nodules. Head/face: Inspection: Normacephalic, atraumatic. Palpation: normal.   Eyes:      Conjunctivae/lids: Normal.   Pupils/irises: Pupils equal, round and normal.   ENMT:      External: Normal.   Hearing: Normal.   Neck:      Neck: Normal appearance, trachea midline. Jugular veins: No JVD noted. Respiratory:      Effort: Normal respiratory effort, comfortable, speaks in complete sentences. Auscultation: normal breath sounds, no rubs, wheezes or rhonchi. Gastrointestinal/Abdomen:      Abdomen: non-distended, nontender. Liver/Spleen: normal, normal size, Liver size and consistency normal, spleen is non-palpable. Musculoskeletal:      Gait/station: normal.   Digits/nails: Normal, no spooning of nails, clubbing, or splinter hemorrhages, no clubbing, cyanosis, petechiae or other inflammatory conditions. Psychiatric:      Judgment/insight: Normal, normal judgement, normal insight. Orientation: oriented to time, space and person. Lymphatic:      Neck: No lymphadenopathy in the cervical or supraclavicular chain. Other: No periumbilic lymphadenopathy. Lab Results: No Electronic Results      Impressions: Iron deficiency anemia? ?Iron deficiency anemia? Assessment: ?         Plan: Upper Endoscopy  Colonoscopy? ? Upper Endoscopy? ?  ? ? Colonoscopy? Risk & Medical Necessity: The patient requires Moderate to High Severity care for this visit. Diagnosis and management options are Multiple. The amount of data reviewed and/or ordered is Minimal/None. The level of risk is Moderate. Notes:              Sarkis Howard MD     Electronically signed on 5/10/2018 4:06:21 PM by Sarkis Howadr, [address]  [facility], MRN [id number],  1950 Follow Up, Thursday, May 10, 2018

## 2018-05-22 ENCOUNTER — ANESTHESIA EVENT (OUTPATIENT)
Dept: ENDOSCOPY | Age: 68
End: 2018-05-22
Payer: COMMERCIAL

## 2018-05-22 ENCOUNTER — ANESTHESIA (OUTPATIENT)
Dept: ENDOSCOPY | Age: 68
End: 2018-05-22
Payer: COMMERCIAL

## 2018-05-22 ENCOUNTER — HOSPITAL ENCOUNTER (OUTPATIENT)
Age: 68
Setting detail: OUTPATIENT SURGERY
Discharge: HOME | End: 2018-05-22
Attending: SPECIALIST | Admitting: SPECIALIST
Payer: COMMERCIAL

## 2018-05-22 VITALS
TEMPERATURE: 97.9 F | BODY MASS INDEX: 39.09 KG/M2 | DIASTOLIC BLOOD PRESSURE: 73 MMHG | SYSTOLIC BLOOD PRESSURE: 120 MMHG | HEART RATE: 58 BPM | WEIGHT: 229 LBS | OXYGEN SATURATION: 98 % | RESPIRATION RATE: 12 BRPM | HEIGHT: 64 IN

## 2018-05-22 PROCEDURE — 77030022875 HC PRB AR PLSM COAG ERBE -C: Performed by: SPECIALIST

## 2018-05-22 PROCEDURE — 74011250636 HC RX REV CODE- 250/636: Performed by: PHYSICIAN ASSISTANT

## 2018-05-22 PROCEDURE — 76060000032 HC ANESTHESIA 0.5 TO 1 HR: Performed by: SPECIALIST

## 2018-05-22 PROCEDURE — 88305 TISSUE EXAM BY PATHOLOGIST: CPT | Performed by: SPECIALIST

## 2018-05-22 PROCEDURE — 74011250636 HC RX REV CODE- 250/636

## 2018-05-22 PROCEDURE — 76040000007: Performed by: SPECIALIST

## 2018-05-22 PROCEDURE — 77030011640 HC PAD GRND REM COVD -A: Performed by: SPECIALIST

## 2018-05-22 PROCEDURE — 74011000250 HC RX REV CODE- 250

## 2018-05-22 PROCEDURE — 77030009426 HC FCPS BIOP ENDOSC BSC -B: Performed by: SPECIALIST

## 2018-05-22 RX ORDER — DEXTROMETHORPHAN/PSEUDOEPHED 2.5-7.5/.8
1.2 DROPS ORAL
Status: DISCONTINUED | OUTPATIENT
Start: 2018-05-22 | End: 2018-05-22 | Stop reason: HOSPADM

## 2018-05-22 RX ORDER — SODIUM CHLORIDE 9 MG/ML
50 INJECTION, SOLUTION INTRAVENOUS CONTINUOUS
Status: DISCONTINUED | OUTPATIENT
Start: 2018-05-22 | End: 2018-05-22 | Stop reason: HOSPADM

## 2018-05-22 RX ORDER — SODIUM CHLORIDE 9 MG/ML
INJECTION, SOLUTION INTRAVENOUS
Status: DISCONTINUED | OUTPATIENT
Start: 2018-05-22 | End: 2018-05-22 | Stop reason: HOSPADM

## 2018-05-22 RX ORDER — EPINEPHRINE 0.1 MG/ML
1 INJECTION INTRACARDIAC; INTRAVENOUS
Status: DISCONTINUED | OUTPATIENT
Start: 2018-05-22 | End: 2018-05-22 | Stop reason: HOSPADM

## 2018-05-22 RX ORDER — LIDOCAINE HYDROCHLORIDE 20 MG/ML
INJECTION, SOLUTION EPIDURAL; INFILTRATION; INTRACAUDAL; PERINEURAL AS NEEDED
Status: DISCONTINUED | OUTPATIENT
Start: 2018-05-22 | End: 2018-05-22 | Stop reason: HOSPADM

## 2018-05-22 RX ORDER — PROPOFOL 10 MG/ML
INJECTION, EMULSION INTRAVENOUS
Status: DISCONTINUED | OUTPATIENT
Start: 2018-05-22 | End: 2018-05-22 | Stop reason: HOSPADM

## 2018-05-22 RX ORDER — NALOXONE HYDROCHLORIDE 0.4 MG/ML
0.4 INJECTION, SOLUTION INTRAMUSCULAR; INTRAVENOUS; SUBCUTANEOUS
Status: DISCONTINUED | OUTPATIENT
Start: 2018-05-22 | End: 2018-05-22 | Stop reason: HOSPADM

## 2018-05-22 RX ORDER — FENTANYL CITRATE 50 UG/ML
25 INJECTION, SOLUTION INTRAMUSCULAR; INTRAVENOUS AS NEEDED
Status: DISCONTINUED | OUTPATIENT
Start: 2018-05-22 | End: 2018-05-22 | Stop reason: HOSPADM

## 2018-05-22 RX ORDER — PROPOFOL 10 MG/ML
INJECTION, EMULSION INTRAVENOUS AS NEEDED
Status: DISCONTINUED | OUTPATIENT
Start: 2018-05-22 | End: 2018-05-22 | Stop reason: HOSPADM

## 2018-05-22 RX ORDER — ATROPINE SULFATE 0.1 MG/ML
0.5 INJECTION INTRAVENOUS
Status: DISCONTINUED | OUTPATIENT
Start: 2018-05-22 | End: 2018-05-22 | Stop reason: HOSPADM

## 2018-05-22 RX ORDER — MIDAZOLAM HYDROCHLORIDE 1 MG/ML
.25-5 INJECTION, SOLUTION INTRAMUSCULAR; INTRAVENOUS AS NEEDED
Status: DISCONTINUED | OUTPATIENT
Start: 2018-05-22 | End: 2018-05-22 | Stop reason: HOSPADM

## 2018-05-22 RX ORDER — FLUMAZENIL 0.1 MG/ML
0.2 INJECTION INTRAVENOUS
Status: DISCONTINUED | OUTPATIENT
Start: 2018-05-22 | End: 2018-05-22 | Stop reason: HOSPADM

## 2018-05-22 RX ADMIN — PROPOFOL 125 MCG/KG/MIN: 10 INJECTION, EMULSION INTRAVENOUS at 07:22

## 2018-05-22 RX ADMIN — PROPOFOL 40 MG: 10 INJECTION, EMULSION INTRAVENOUS at 07:24

## 2018-05-22 RX ADMIN — SODIUM CHLORIDE 50 ML/HR: 9 INJECTION, SOLUTION INTRAVENOUS at 06:46

## 2018-05-22 RX ADMIN — LIDOCAINE HYDROCHLORIDE 20 MG: 20 INJECTION, SOLUTION EPIDURAL; INFILTRATION; INTRACAUDAL; PERINEURAL at 07:20

## 2018-05-22 RX ADMIN — PROPOFOL 40 MG: 10 INJECTION, EMULSION INTRAVENOUS at 07:27

## 2018-05-22 RX ADMIN — PROPOFOL 60 MG: 10 INJECTION, EMULSION INTRAVENOUS at 07:22

## 2018-05-22 RX ADMIN — SODIUM CHLORIDE: 9 INJECTION, SOLUTION INTRAVENOUS at 06:31

## 2018-05-22 NOTE — ANESTHESIA POSTPROCEDURE EVALUATION
Post-Anesthesia Evaluation and Assessment    Patient: Sanjeev Brizuela MRN: [id number]  SSN: [id number]    YOB: 1950  Age: 79 y.o. Sex: female       Cardiovascular Function/Vital Signs  Visit Vitals    /73    Pulse (!) 58    Temp 36.6 °C (97.9 °F)    Resp 12    Ht 5' 4\" (1.626 m)    Wt 103.9 kg (229 lb)    SpO2 98%    Breastfeeding No    BMI 39.31 kg/m2       Patient is status post MAC anesthesia for Procedure(s):  COLONOSCOPY  ESOPHAGOGASTRODUODENOSCOPY (EGD)  ENDOSCOPIC POLYPECTOMY  ENDOSCOPIC ARGON PLASMA COAGULATION. Nausea/Vomiting: None    Postoperative hydration reviewed and adequate. Pain:  Pain Scale 1: Numeric (0 - 10) (05/22/18 0816)  Pain Intensity 1: 0 (05/22/18 0816)   Managed    Neurological Status: At baseline    Mental Status and Level of Consciousness: Arousable    Pulmonary Status:   O2 Device: Room air (05/22/18 0816)   Adequate oxygenation and airway patent    Complications related to anesthesia: None    Post-anesthesia assessment completed.  No concerns    Signed By: Giulia Quintana MD     May 22, 2018

## 2018-05-22 NOTE — IP AVS SNAPSHOT
[address] 
[phone] Patient: Sondra Reyes MRN: [id number] EXB:[id number] About your hospitalization You were admitted on:  May 22, 2018 You last received care in the:  [facility] ENDOSCOPY You were discharged on:  May 22, 2018 Why you were hospitalized Your primary diagnosis was:  Not on File Follow-up Information None Discharge Orders None A check juliana indicates which time of day the medication should be taken. My Medications CONTINUE taking these medications Instructions Each Dose to Equal  
 Morning Noon Evening Bedtime * albuterol 90 mcg/actuation inhaler Commonly known as:  VENTOLIN HFA Your last dose was: Your next dose is: Take 2 Puffs by inhalation every six (6) hours as needed for Wheezing or Shortness of Breath (may also use for cough). 2 Puff * albuterol 2.5 mg /3 mL (0.083 %) nebulizer solution Commonly known as:  PROVENTIL VENTOLIN Your last dose was: Your next dose is:    
   
   
 3 mL by Nebulization route every four (4) hours as needed for Wheezing. 2.5 mg  
    
   
   
   
  
 atorvastatin 10 mg tablet Commonly known as:  LIPITOR Your last dose was: Your next dose is: TAKE 1 TABLET DAILY DULERA 200-5 mcg/actuation HFA inhaler Generic drug:  mometasone-formoterol Your last dose was: Your next dose is:    
   
   
 USE 2 INHALATIONS TWICE A DAY  
     
   
   
   
  
 ipratropium 0.02 % Soln Commonly known as:  ATROVENT Your last dose was: Your next dose is:    
   
   
 2.5 mL by Nebulization route as needed for Wheezing (every 6 hours as needed). 0.5 mg  
    
   
   
   
  
 lansoprazole 30 mg capsule Commonly known as:  PREVACID Your last dose was: Your next dose is: TAKE 1 CAPSULE DAILY BEFORE BREAKFAST  
     
   
   
   
  
 levocetirizine 5 mg tablet Commonly known as:  Sterling Ambrosia Your last dose was: Your next dose is: TAKE 1 TABLET DAILY losartan 25 mg tablet Commonly known as:  COZAAR Your last dose was: Your next dose is: TAKE 1 TABLET DAILY  
     
   
   
   
  
 montelukast 10 mg tablet Commonly known as:  SINGULAIR Your last dose was: Your next dose is: TAKE 1 TABLET DAILY Nebulizer & Compressor machine Your last dose was: Your next dose is:    
   
   
 Use as directed PARoxetine 20 mg tablet Commonly known as:  PAXIL Your last dose was: Your next dose is: TAKE 1 TABLET BY MOUTH DAILY potassium chloride 20 mEq tablet Commonly known as:  K-DUR, KLOR-CON Your last dose was: Your next dose is: TAKE 1 TABLET THREE TIMES A DAY  
     
   
   
   
  
 triamterene-hydroCHLOROthiazide 37.5-25 mg per tablet Commonly known as:  Britney Bone Your last dose was: Your next dose is: TAKE 1 TABLET DAILY * Notice: This list has 2 medication(s) that are the same as other medications prescribed for you. Read the directions carefully, and ask your doctor or other care provider to review them with you. Discharge Instructions [address]. Mikael Johnston, [address] [place] [address] 
[phone] May 22, 2018 Glady Romberg YOB: 1950 COLONOSCOPY DISCHARGE INSTRUCTIONS If there is redness at IV site you should apply warm compress to area. If redness or soreness persist contact Dr. Mikael Johnston' or your primary care doctor. There may be a slight amount of blood passed from the rectum. Gaseous discomfort may develop, but walking, belching will help relieve this. You may not operate a vehicle for 12 hours You may not operate machinery or dangerous appliances for rest of today You may not drink alcoholic beverages for 12 hours Avoid making any critical decisions for 24 hours DIET: 
You may resume your normal diet, but some patients find that heavy or large meals may lead to indigestion or vomiting. I suggest a light meal as first food intake. MEDICATIONS: 
The use of some over-the-counter pain medication may lead to bleeding after colon biopsies or polyp removal.  Tylenol (also called acetaminophen) is safe to take even if you have had colonoscopy with polyp removal.  Based on the procedure you had today you may not safely take aspirin or aspirin-like products for the next ten (10) days. Remember that Tylenol (also called acetaminophen) is safe to take after colonoscopy even if you have had biopsies or polyps removed. ACTIVITY: 
You may resume your normal household activities, but it is recommended that you spend the remainder of the day resting -  avoid any strenuous activity. CALL DR. Kya Mead' OFFICE IF: Increasing pain, nausea, vomiting Abdominal distension (swelling) Significant new or increased bleeding (oral or rectal) Fever/Chills Chest pain/shortness of breath Additional instructions:  
No aspirin 10 days We found 3 small AVM and ablated them We found one small polyp and removed that My office will contact you to arrange the pillcam test to be done. It was an honor to be your doctor today. Please let me or my office staff know if you have any feedback about today's procedure. Juan José Raymundo MD 
 
Colonoscopy saves lives, and can prevent colon cancer. Everyone aged 48 or older needs colonoscopy.   Tell your family and friends: get the test! 
 
 
 
 
  
  
  
 Introducing [facility]! [employer] introduces [employer] patient portal. Now you can access parts of your medical record, email your doctor's office, and request medication refills online. 1. In your internet browser, go to [email or web address] 2. Click on the First Time User? Click Here link in the Sign In box. You will see the New Member Sign Up page. 3. Enter your [employer] Access Code exactly as it appears below. You will not need to use this code after youve completed the sign-up process. If you do not sign up before the expiration date, you must request a new code. · [employer] Access Code: [email or web address] Expires: 8/20/2018  6:04 AM 
 
4. Enter the last four digits of your Social Security Number (xxxx) and Date of Birth (mm/dd/yyyy) as indicated and click Submit. You will be taken to the next sign-up page. 5. Create a [employer] ID. This will be your [employer] login ID and cannot be changed, so think of one that is secure and easy to remember. 6. Create a [employer] password. You can change your password at any time. 7. Enter your Password Reset Question and Answer. This can be used at a later time if you forget your password. 8. Enter your e-mail address. You will receive e-mail notification when new information is available in [address] 9. Click Sign Up. You can now view and download portions of your medical record. 10. Click the Download Summary menu link to download a portable copy of your medical information. If you have questions, please visit the Frequently Asked Questions section of the [employer] website. Remember, [employer] is NOT to be used for urgent needs. For medical emergencies, dial 911. Now available from your iPhone and Android! Introducing [employer] As a [employer] patient, I wanted to make you aware of our electronic visit tool called Blu V[employer]ira. [employer] 24/7 allows you to connect within minutes with a medical provider 24 hours a day, seven days a week via a mobile device or tablet or logging into a secure website from your computer. You can access [email or web address] from anywhere in the [place]. A virtual visit might be right for you when you have a simple condition and feel like you just dont want to get out of bed, or cant get away from work for an appointment, when your regular [employer] provider is not available (evenings, weekends or holidays), or when youre out of town and need minor care. Electronic visits cost only $49 and if the [employer] 24/7 provider determines a prescription is needed to treat your condition, one can be electronically transmitted to a nearby pharmacy*. Please take a moment to enroll today if you have not already done so. The enrollment process is free and takes just a few minutes. To enroll, please download the [employer] 24/7 anita to your tablet or phone, or visit [email or web address] to enroll on your computer. And, as an [address] [place] [address] patient with a [employer] account, the results of your visits will be scanned into your electronic medical record and your primary care provider will be able to view the scanned results. We urge you to continue to see your regular [employer] provider for your ongoing medical care. And while your primary care provider may not be the one available when you seek a [employer] virtual visit, the peace of mind you get from getting a real diagnosis real time can be priceless. For more information on [email or web address], view our Frequently Asked Questions (FAQs) at [email or web address]. Sincerely, 
 
Celina Gresham MD 
Chief Medical Officer [employer] *:  certain medications cannot be prescribed via [employer] Providers Seen During Your Hospitalization Provider Specialty Primary office phone Glenda Guo MD Gastroenterology [phone] Your Primary Care Physician (PCP) Primary Care Physician Office Phone Office Fax Yoshi Lee [phone] You are allergic to the following Allergen Reactions Loratadine Nausea Only Other (comments) Muscle weakness Recent Documentation Height Weight Breastfeeding? BMI OB Status Smoking Status 1.626 m 103.9 kg No 39.31 kg/m2 Hysterectomy Former Smoker Emergency Contacts Name Discharge Info Relation Home Work Mobile Adalgisa,David DISCHARGE CAREGIVER [3] Spouse [3] [phone] Patient Belongings The following personal items are in your possession at time of discharge: 
  Dental Appliances: None  Visual Aid: Glasses, At bedside Please provide this summary of care documentation to your next provider. Signatures-by signing, you are acknowledging that this After Visit Summary has been reviewed with you and you have received a copy. Patient Signature:  ____________________________________________________________ Date:  ____________________________________________________________  
  
Soledad Pennington Provider Signature:  ____________________________________________________________ Date:  ____________________________________________________________

## 2018-05-22 NOTE — DISCHARGE INSTRUCTIONS
[address] [facility] ISAÍAS Benitez MD  [phone]      May 22, 2018    Ashley Torres  YOB: 1950    COLONOSCOPY DISCHARGE INSTRUCTIONS    If there is redness at IV site you should apply warm compress to area. If redness or soreness persist contact Dr. Soledad Benitez' or your primary care doctor. There may be a slight amount of blood passed from the rectum. Gaseous discomfort may develop, but walking, belching will help relieve this. You may not operate a vehicle for 12 hours  You may not operate machinery or dangerous appliances for rest of today  You may not drink alcoholic beverages for 12 hours  Avoid making any critical decisions for 24 hours    DIET:  You may resume your normal diet, but some patients find that heavy or large meals may lead to indigestion or vomiting. I suggest a light meal as first food intake. MEDICATIONS:  The use of some over-the-counter pain medication may lead to bleeding after colon biopsies or polyp removal.  Tylenol (also called acetaminophen) is safe to take even if you have had colonoscopy with polyp removal.  Based on the procedure you had today you may not safely take aspirin or aspirin-like products for the next ten (10) days. Remember that Tylenol (also called acetaminophen) is safe to take after colonoscopy even if you have had biopsies or polyps removed. ACTIVITY:  You may resume your normal household activities, but it is recommended that you spend the remainder of the day resting -  avoid any strenuous activity.     CALL DR. Nithya Salazar' OFFICE IF:  Increasing pain, nausea, vomiting  Abdominal distension (swelling)  Significant new or increased bleeding (oral or rectal)  Fever/Chills  Chest pain/shortness of breath                       Additional instructions:   No aspirin 10 days  We found 3 small AVM and ablated them   We found one small polyp and removed that  My office will contact you to arrange the pillcam test to be done. It was an honor to be your doctor today. Please let me or my office staff know if you have any feedback about today's procedure. Ching Breaux MD    Colonoscopy saves lives, and can prevent colon cancer. Everyone aged 48 or older needs colonoscopy.   Tell your family and friends: get the test!

## 2018-05-22 NOTE — ANESTHESIA PREPROCEDURE EVALUATION
Anesthetic History   No history of anesthetic complications            Review of Systems / Medical History  Patient summary reviewed, nursing notes reviewed and pertinent labs reviewed    Pulmonary    COPD               Neuro/Psych         Psychiatric history     Cardiovascular    Hypertension          Hyperlipidemia    Exercise tolerance: >4 METS  Comments: Not on beta blocker   GI/Hepatic/Renal     GERD           Endo/Other        Obesity and morbid obesity     Other Findings            Physical Exam    Airway  Mallampati: II  TM Distance: < 4 cm  Neck ROM: normal range of motion, short neck   Mouth opening: Normal     Cardiovascular  Regular rate and rhythm,  S1 and S2 normal,  no murmur, click, rub, or gallop  Rhythm: regular  Rate: normal         Dental  No notable dental hx       Pulmonary  Breath sounds clear to auscultation               Abdominal  GI exam deferred       Other Findings            Anesthetic Plan    ASA: 2  Anesthesia type: MAC            Anesthetic plan and risks discussed with: Patient

## 2018-05-22 NOTE — PERIOP NOTES
[id number]  Anesthesia staff at patient's bedside administering anesthesia and monitoring patients vital signs throughout procedure. See anesthesia note.    6580  Endoscope was pre-cleaned at bedside immediately following procedure by Percy Crespo 45  Patient tolerated procedure without problems. Abdomen soft and patient arousable and voices no complaints Report received from CRNA, see anesthesia note. Patient transported to endoscopy recovery area. Report given to Bailey Sánchez RN.

## 2018-05-22 NOTE — PROCEDURES
[facility]  [phone]      May 22, 2018    Esophagogastroduodenoscopy & Colonoscopy Procedure Note  Sondra Reyes  : 1950  [employer] Medical Record Number: [id number]      Indications:    Iron deficiency anemia Occult blood in stool   Referring Physician:  Rina Ospina NP  Anesthesia/Sedation: Conscious Sedation/Moderate Sedation/MAC  Endoscopist:  Dr. Jill Cisse  Complications:  None  Estimated Blood Loss:  None    Permit:  The indications, risks, benefits and alternatives were reviewed with the patient or their decision maker who was provided an opportunity to ask questions and all questions were answered. The specific risks of esophagogastroduodenoscopy with conscious sedation were reviewed, including but not limited to anesthetic complication, bleeding, adverse drug reaction, missed lesion, infection, IV site reactions, and intestinal perforation which would lead to the need for surgical repair. Alternatives to EGD and colonoscopy including radiographic imaging, observation without testing, or laboratory testing were reviewed as well as the limitations of those alternatives discussed. After considering the options and having all their questions answered, the patient or their decision maker provided both verbal and written consent to proceed. -----------EGD------------   Procedure in Detail:  After obtaining informed consent, positioning of the patient in the left lateral decubitus position, and conduction of a pre-procedure pause or \"time out\" the endoscope was introduced into the mouth and advanced to the duodenum. A careful inspection was made, and findings or interventions are described below.     Findings:   Esophagus:normal  Stomach: normal   Duodenum/jejunum: normal        ----------Colonoscopy-----------    Procedure in Detail:  After obtaining informed consent, positioning of the patient in the left lateral decubitus position, and conduction of a pre-procedure pause or \"time out\" the endoscope was introduced into the anus and advanced to the cecum, which was identified by the ileocecal valve and appendiceal orifice. The quality of the colonic preparation was good. A careful inspection was made as the colonoscope was withdrawn, findings and interventions are described below. Findings:   3 small AVM of the right colon are discovered and ablated with argon plasma coagulation today. One small 4mm polyp of the transverse colon is removed with cold snare and all samples retrieved. There is diverticulosis in the sigmoid colon without complications such as bleeding, inflammatory change, or luminal narrowing.      ------------------------------  Specimens:    See above    Complications:   None; patient tolerated the procedure well. Impressions:  EGD:  Normal  Colonoscopy: AVM of colon ( ablated ), and one small colon polyp      Recommendations:     - Await pathology. - Pillcam small intestinal study will be arranged. Thank you for entrusting me with this patient's care. Please do not hesitate to contact me with any questions or if I can be of assistance with any of your other patients' GI needs.     Signed By: Rossy Martinez MD                        May 22, 2018

## 2018-05-22 NOTE — ROUTINE PROCESS
Sahil Jenna  1950  [id number]    Situation:  Verbal report received from: Triston Kaur RN  Procedure: Procedure(s):  COLONOSCOPY  ESOPHAGOGASTRODUODENOSCOPY (EGD)  ENDOSCOPIC POLYPECTOMY  ENDOSCOPIC ARGON PLASMA COAGULATION    Background:    Preoperative diagnosis: IRON DEFICIENCY ANEMIA  Postoperative diagnosis: Diverticulosis, AVM, polyp. :  Dr. Ramon Russ  Assistant(s): Endoscopy Technician-1: Radha Tran  Endoscopy RN-1: Triston Kaur RN    Specimens:   ID Type Source Tests Collected by Time Destination   1 : transverse colon polyp Saline Colon, Transverse  Glenda Guo MD 5/22/2018 0745 Pathology     H. Pylori  no    Assessment:  Intra-procedure medications     Anesthesia gave intra-procedure sedation and medications, see anesthesia flow sheet yes    Intravenous fluids: NS@ KVO     Vital signs stable     Abdominal assessment: round and soft     Recommendation:  Discharge patient per MD order  Family or Friend   Permission to share finding with family or friend yes    Endoscopy discharge instructions have been reviewed and given to patient and spouse. The patient and spouse verbalized understanding and acceptance of instructions.

## 2018-05-22 NOTE — INTERVAL H&P NOTE
Pre-Endoscopy H&P Update  Chief complaint/HPI/ROS:  The indication for the procedure, the patient's history and the patient's current medications are reviewed prior to the procedure and that data is reported on the H&P to which this document is attached. Any significant complaints with regard to organ systems will be noted, and if not mentioned then a review of systems is not contributory. Past Medical History:   Diagnosis Date    Anemia     COPD (chronic obstructive pulmonary disease) (HCC)     GERD (gastroesophageal reflux disease)     Hypercholesterolemia     Hypertension     Obesity (BMI 30-39. 9)     Psychiatric disorder     depression/ not currently taking    Vertigo     Vestibulopathy 7/27/2016      Past Surgical History:   Procedure Laterality Date    HX COLONOSCOPY  10/25/2011    1 polyp which was removed, diverticulosis in distal descending colon & sigmoid colon.  HX ENDOSCOPY  04/2014    HX GYN  1996    hysterectomy    HX ORTHOPAEDIC Left     foot surgery    HX OTHER SURGICAL  2006? L Lower Abd. Basal Cell CA    HX TONSILLECTOMY  1958     Social   Social History   Substance Use Topics    Smoking status: Former Smoker     Packs/day: 1.00     Years: 18.00     Types: Cigarettes     Quit date: 7/24/1988    Smokeless tobacco: Never Used      Comment: Quit in 1988    Alcohol use Yes      Comment: rarely      Family History   Problem Relation Age of Onset    Hypertension Mother     Stroke Maternal Grandmother     Cancer Paternal Grandmother      breast cancer      Allergies   Allergen Reactions    Loratadine Nausea Only and Other (comments)     Muscle weakness      Prior to Admission Medications   Prescriptions Last Dose Informant Patient Reported? Taking?    DULERA 200-5 mcg/actuation HFA inhaler 5/22/2018 at Unknown time  No Yes   Sig: USE 2 INHALATIONS TWICE A DAY   Nebulizer & Compressor machine Unknown at Unknown time  No No   Sig: Use as directed   PARoxetine (PAXIL) 20 mg tablet Not Taking at Unknown time  No No   Sig: TAKE 1 TABLET BY MOUTH DAILY   albuterol (PROVENTIL VENTOLIN) 2.5 mg /3 mL (0.083 %) nebulizer solution Unknown at Unknown time  No No   Sig: 3 mL by Nebulization route every four (4) hours as needed for Wheezing. albuterol (VENTOLIN HFA) 90 mcg/actuation inhaler Unknown at Unknown time Self No No   Sig: Take 2 Puffs by inhalation every six (6) hours as needed for Wheezing or Shortness of Breath (may also use for cough). atorvastatin (LIPITOR) 10 mg tablet 2018  No No   Sig: TAKE 1 TABLET DAILY   ipratropium (ATROVENT) 0.02 % nebulizer solution Unknown at Unknown time  No No   Si.5 mL by Nebulization route as needed for Wheezing (every 6 hours as needed). lansoprazole (PREVACID) 30 mg capsule 2018  No No   Sig: TAKE 1 CAPSULE DAILY BEFORE BREAKFAST   levocetirizine (XYZAL) 5 mg tablet 2018  No No   Sig: TAKE 1 TABLET DAILY   losartan (COZAAR) 25 mg tablet 2018  No No   Sig: TAKE 1 TABLET DAILY   montelukast (SINGULAIR) 10 mg tablet 2018  No No   Sig: TAKE 1 TABLET DAILY   potassium chloride (K-DUR, KLOR-CON) 20 mEq tablet 2018  No No   Sig: TAKE 1 TABLET THREE TIMES A DAY   triamterene-hydroCHLOROthiazide (MAXZIDE) 37.5-25 mg per tablet 2018  No No   Sig: TAKE 1 TABLET DAILY      Facility-Administered Medications: None       PHYSICAL EXAM:  The patient is examined immediately prior to the procedure. Visit Vitals    /62    Pulse 62    Temp 97.7 °F (36.5 °C)    Resp 13    Ht 5' 4\" (1.626 m)    Wt 103.9 kg (229 lb)    SpO2 95%    Breastfeeding No    BMI 39.31 kg/m2     Gen: Appears comfortable, no distress. Pulm: comfortable respirations with no abnormal audible breath sounds  CV: heart regular, well perfused  GI: abdomen flat. PLAN:  Informed consent discussion held, patient afforded an opportunity to ask questions and all questions answered.   After being advised of the risks, benefits, and alternatives, the patient requested that we proceed and indicated so on a written consent form. Will proceed with procedure as planned.   Nixon Mendez MD

## 2018-07-30 RX ORDER — LANSOPRAZOLE 30 MG/1
CAPSULE, DELAYED RELEASE ORAL
Qty: 90 CAP | Refills: 1 | Status: SHIPPED | OUTPATIENT
Start: 2018-07-30 | End: 2019-05-13 | Stop reason: SDUPTHER

## 2018-08-23 DIAGNOSIS — I10 ESSENTIAL HYPERTENSION WITH GOAL BLOOD PRESSURE LESS THAN 130/80: ICD-10-CM

## 2018-08-23 RX ORDER — LOSARTAN POTASSIUM 25 MG/1
TABLET ORAL
Qty: 90 TAB | Refills: 1 | Status: SHIPPED | OUTPATIENT
Start: 2018-08-23 | End: 2018-09-25 | Stop reason: SDUPTHER

## 2018-09-03 RX ORDER — POTASSIUM CHLORIDE 20 MEQ/1
TABLET, EXTENDED RELEASE ORAL
Qty: 270 TAB | Refills: 1 | Status: SHIPPED | OUTPATIENT
Start: 2018-09-03 | End: 2018-09-25 | Stop reason: SDUPTHER

## 2018-09-17 RX ORDER — ATORVASTATIN CALCIUM 10 MG/1
TABLET, FILM COATED ORAL
Qty: 90 TAB | Refills: 1 | Status: SHIPPED | OUTPATIENT
Start: 2018-09-17 | End: 2018-09-25 | Stop reason: SDUPTHER

## 2018-09-25 ENCOUNTER — OFFICE VISIT (OUTPATIENT)
Dept: FAMILY MEDICINE CLINIC | Age: 68
End: 2018-09-25

## 2018-09-25 VITALS
TEMPERATURE: 98.1 F | HEART RATE: 62 BPM | OXYGEN SATURATION: 93 % | WEIGHT: 233.3 LBS | BODY MASS INDEX: 39.83 KG/M2 | RESPIRATION RATE: 18 BRPM | SYSTOLIC BLOOD PRESSURE: 132 MMHG | DIASTOLIC BLOOD PRESSURE: 83 MMHG | HEIGHT: 64 IN

## 2018-09-25 DIAGNOSIS — Z23 ENCOUNTER FOR IMMUNIZATION: ICD-10-CM

## 2018-09-25 DIAGNOSIS — I10 ESSENTIAL HYPERTENSION WITH GOAL BLOOD PRESSURE LESS THAN 130/80: ICD-10-CM

## 2018-09-25 DIAGNOSIS — Z13.39 SCREENING FOR ALCOHOLISM: ICD-10-CM

## 2018-09-25 DIAGNOSIS — Z12.31 ENCOUNTER FOR SCREENING MAMMOGRAM FOR MALIGNANT NEOPLASM OF BREAST: ICD-10-CM

## 2018-09-25 DIAGNOSIS — D50.0 IRON DEFICIENCY ANEMIA DUE TO CHRONIC BLOOD LOSS: ICD-10-CM

## 2018-09-25 DIAGNOSIS — J44.9 CHRONIC OBSTRUCTIVE PULMONARY DISEASE, UNSPECIFIED COPD TYPE (HCC): ICD-10-CM

## 2018-09-25 DIAGNOSIS — R73.9 HYPERGLYCEMIA: ICD-10-CM

## 2018-09-25 DIAGNOSIS — E78.00 HYPERCHOLESTEROLEMIA: ICD-10-CM

## 2018-09-25 DIAGNOSIS — R63.2 POLYPHAGIA: ICD-10-CM

## 2018-09-25 DIAGNOSIS — Z00.00 WELCOME TO MEDICARE PREVENTIVE VISIT: Primary | ICD-10-CM

## 2018-09-25 DIAGNOSIS — R25.1 SHAKINESS: ICD-10-CM

## 2018-09-25 DIAGNOSIS — Z13.31 SCREENING FOR DEPRESSION: ICD-10-CM

## 2018-09-25 PROBLEM — E66.01 OBESITY, MORBID (HCC): Status: ACTIVE | Noted: 2018-09-25

## 2018-09-25 RX ORDER — LEVOCETIRIZINE DIHYDROCHLORIDE 5 MG/1
TABLET, FILM COATED ORAL
Qty: 90 TAB | Refills: 1 | Status: SHIPPED | OUTPATIENT
Start: 2018-09-25 | End: 2019-06-28 | Stop reason: SDUPTHER

## 2018-09-25 RX ORDER — LOSARTAN POTASSIUM 25 MG/1
TABLET ORAL
Qty: 90 TAB | Refills: 1 | Status: SHIPPED | OUTPATIENT
Start: 2018-09-25 | End: 2019-03-18 | Stop reason: SDUPTHER

## 2018-09-25 RX ORDER — ATORVASTATIN CALCIUM 10 MG/1
TABLET, FILM COATED ORAL
Qty: 90 TAB | Refills: 1 | Status: SHIPPED | OUTPATIENT
Start: 2018-09-25 | End: 2019-08-05 | Stop reason: SDUPTHER

## 2018-09-25 RX ORDER — POTASSIUM CHLORIDE 20 MEQ/1
TABLET, EXTENDED RELEASE ORAL
Qty: 270 TAB | Refills: 1 | Status: SHIPPED | OUTPATIENT
Start: 2018-09-25 | End: 2019-04-03 | Stop reason: SDUPTHER

## 2018-09-25 RX ORDER — TRIAMTERENE/HYDROCHLOROTHIAZID 37.5-25 MG
TABLET ORAL
Qty: 90 TAB | Refills: 1 | Status: CANCELLED | OUTPATIENT
Start: 2018-09-25

## 2018-09-25 NOTE — PROGRESS NOTES
Chief Complaint   Patient presents with    Medication Evaluation    Medication Refill     Pt here for medication refills.      Pt states she needs to update her medication list.

## 2018-09-25 NOTE — PROGRESS NOTES
Subjective:     Silas Chavarria is a 79 y.o. female who presents for follow up of hypertension, hyperlipidemia, anemia, obesity and COPD. Diet and Lifestyle: not attempting to follow a low fat, low cholesterol diet, not attempting to follow a low sodium diet, sedentary  Home BP Monitoring: is not measured at home    Cardiovascular ROS: taking medications as instructed, no medication side effects noted, no TIA's, no chest pain on exertion, no dyspnea on exertion, no swelling of ankles, no orthostatic dizziness or lightheadedness. New concerns: c/o almost daily episodes of shakiness and cloudy thinking in the mornings. She notes that if she does not eat or if she eats only cereal, she will almost always have one. If she eats eggs or chester, she is less likely to have an episode. She denies known hx of diabetes or hypoglycemia. She reports eating peanut butter during the episode helps resolve the symptoms. She eats a sugary snack such as ice cream or cookies most nights at bedtime. .     Patient Active Problem List   Diagnosis Code    GERD (gastroesophageal reflux disease) K21.9    Obesity (BMI 30-39. 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 9)     Psychiatric disorder     depression/ not currently taking    Vertigo     Vestibulopathy 7/27/2016 Past Surgical History:   Procedure Laterality Date    COLONOSCOPY N/A 5/22/2018    COLONOSCOPY performed by Alan Kaufman MD at [address] HX COLONOSCOPY  10/25/2011    1 polyp which was removed, diverticulosis in distal descending colon & sigmoid colon.  HX ENDOSCOPY  04/2014    HX GYN  1996    hysterectomy    HX ORTHOPAEDIC Left     foot surgery    HX OTHER SURGICAL  2006? L Lower Abd. Basal Cell CA    HX TONSILLECTOMY  1958     Family History   Problem Relation Age of Onset    Hypertension Mother     Stroke Maternal Grandmother     Cancer Paternal Grandmother      breast cancer     Social History   Substance Use Topics    Smoking status: Former Smoker     Packs/day: 1.00     Years: 18.00     Types: Cigarettes     Quit date: 7/24/1988    Smokeless tobacco: Never Used      Comment: Quit in 1988    Alcohol use Yes      Comment: rarely        Lab Results  Component Value Date/Time   Cholesterol, total 152 09/25/2018 09:50 AM   HDL Cholesterol 33 (L) 09/25/2018 09:50 AM   LDL, calculated 73 09/25/2018 09:50 AM   Triglyceride 232 (H) 09/25/2018 09:50 AM   CHOL/HDL Ratio 5.6 (H) 07/26/2016 12:36 PM     Lab Results  Component Value Date/Time   ALT (SGPT) 21 09/25/2018 09:50 AM   AST (SGOT) 23 09/25/2018 09:50 AM   Alk.  phosphatase 77 09/25/2018 09:50 AM   Bilirubin, direct 0.1 10/15/2011 03:07 PM   Bilirubin, total 0.5 09/25/2018 09:50 AM   Albumin 4.5 09/25/2018 09:50 AM   Protein, total 7.3 09/25/2018 09:50 AM   INR 1.0 10/15/2011 03:07 PM   INR (POC) 1.0 07/26/2016 12:07 PM   Prothrombin time 10.0 10/15/2011 03:07 PM   PLATELET 026 37/75/9877 09:50 AM       Lab Results  Component Value Date/Time   GFR est non-AA 81 09/25/2018 09:50 AM   GFRNA, POC 54 (L) 10/15/2011 03:12 PM   GFR est AA 93 09/25/2018 09:50 AM   GFRAA, POC >60 10/15/2011 03:12 PM   Creatinine 0.77 09/25/2018 09:50 AM   Creatinine (POC) 1.1 10/15/2011 03:12 PM   BUN 10 09/25/2018 09:50 AM   BUN (POC) 10 10/15/2011 03:12 PM   Sodium 146 (H) 09/25/2018 09:50 AM   Sodium (POC) 141 10/15/2011 03:12 PM   Potassium 4.0 09/25/2018 09:50 AM   Potassium (POC) 3.5 10/15/2011 03:12 PM   Chloride 103 09/25/2018 09:50 AM   Chloride (POC) 104 10/15/2011 03:12 PM   CO2 21 09/25/2018 09:50 AM   Magnesium 1.9 07/27/2016 01:03 AM        Review of Systems, additional:  A comprehensive review of systems was negative except for that written in the HPI. Objective:     Visit Vitals    /83    Pulse 62    Temp 98.1 °F (36.7 °C) (Oral)    Resp 18    Ht 5' 4\" (1.626 m)    Wt 233 lb 4.8 oz (105.8 kg)    LMP 01/01/1996    SpO2 93%    BMI 40.05 kg/m2     Appearance: alert, well appearing, and in no distress, oriented to person, place, and time and well hydrated. General exam: CVS exam BP noted to be well controlled today in office, S1, S2 normal, no gallop, no murmur, chest clear, no JVD, no HSM, no edema, peripheral vascular exam both carotids normal upstroke without bruits, neurological exam alert, oriented, normal speech, no focal findings or movement disorder noted. Assessment/Plan:     .  reviewed diet, exercise and weight control  copy of written low fat low cholesterol diet provided and reviewed  cardiovascular risk and specific lipid/LDL goals reviewed  reviewed medications and side effects in detail  reviewed potential future medication changes and side effects. Diagnoses and all orders for this visit:      1. Essential hypertension with goal blood pressure less than 130/80  Near goal  Continue current Rx  DASH diet  Weight loss  Daily walking or other exercise  -     losartan (COZAAR) 25 mg tablet; TAKE 1 TABLET DAILY  -     METABOLIC PANEL, COMPREHENSIVE    2. Chronic obstructive pulmonary disease, unspecified COPD type ([address]  Stable on dulera, continue rx  F/u was scheduled with pulmonary    3. Iron deficiency anemia due to chronic blood loss  Check CBC due to new symptoms  -     CBC WITH AUTOMATED DIFF    3. Shakiness  Hypoglycemia vs thryoid disease vs other  Check labs  -     TSH 3RD GENERATION    4. Hypercholesterolemia  TLC Diet:  -- Saturated fat <7% of calories, cholesterol <200 mg/day  -- Consider increased viscous (soluble) fiber (10-25 g/day) and plant stanols/sterols  (2g/day) as therapeutic options to enhance LDL lowering  Weight management  Increased physical activity. Continue atrovastatin pending results  -     atorvastatin (LIPITOR) 10 mg tablet; TAKE 1 TABLET DAILY  -     LIPID PANEL    5. Polyphagia  6. Hyperglycemia  Check A1C  Recommend reducing sugar and simple carbs in the diet, especially as evening/bedtime snack  Increase protein and fiber  -     HEMOGLOBIN A1C WITH EAG    Other orders  -     potassium chloride (K-DUR, KLOR-CON) 20 mEq tablet; TAKE 1 TABLET THREE TIMES A DAY  -     levocetirizine (XYZAL) 5 mg tablet; TAKE 1 TABLET DAILY  -     CVD REPORT      Follow-up Disposition: RTC 3 months routine f/u      I have discussed the diagnosis with the patient and the intended plan as seen in the above orders. The patient has received an after-visit summary and questions were answered concerning future plans. Patient conveyed understanding of the plan at the time of the visit.     Romulo Nolan NP  09/30/18

## 2018-09-25 NOTE — PROGRESS NOTES
This is a \"Welcome to [employer]"  Initial Preventive Physical Examination (IPPE) providing Personalized Prevention Plan Services (Performed in the first 12 months of enrollment)    I have reviewed the patient's medical history in detail and updated the computerized patient record. History     Past Medical History:   Diagnosis Date    Anemia     COPD (chronic obstructive pulmonary disease) ([facility])     GERD (gastroesophageal reflux disease)     Hypercholesterolemia     Hypertension     Obesity (BMI 30-39. 9)     Psychiatric disorder     depression/ not currently taking    Vertigo     Vestibulopathy 7/27/2016      Past Surgical History:   Procedure Laterality Date    COLONOSCOPY N/A 5/22/2018    COLONOSCOPY performed by Nadir Flynn MD at [address] [facility] HX COLONOSCOPY  10/25/2011    1 polyp which was removed, diverticulosis in distal descending colon & sigmoid colon.  HX ENDOSCOPY  04/2014    HX GYN  1996    hysterectomy    HX ORTHOPAEDIC Left     foot surgery    HX OTHER SURGICAL  2006? L Lower Abd.  Basal Cell CA    HX TONSILLECTOMY  1958     Current Outpatient Prescriptions   Medication Sig Dispense Refill    losartan (COZAAR) 25 mg tablet TAKE 1 TABLET DAILY 90 Tab 1    potassium chloride (K-DUR, KLOR-CON) 20 mEq tablet TAKE 1 TABLET THREE TIMES A  Tab 1    atorvastatin (LIPITOR) 10 mg tablet TAKE 1 TABLET DAILY 90 Tab 1    levocetirizine (XYZAL) 5 mg tablet TAKE 1 TABLET DAILY 90 Tab 1    lansoprazole (PREVACID) 30 mg capsule TAKE 1 CAPSULE DAILY BEFORE BREAKFAST 90 Cap 1    triamterene-hydroCHLOROthiazide (MAXZIDE) 37.5-25 mg per tablet TAKE 1 TABLET DAILY 90 Tab 1    PARoxetine (PAXIL) 20 mg tablet TAKE 1 TABLET BY MOUTH DAILY 30 Tab 5    DULERA 200-5 mcg/actuation HFA inhaler USE 2 INHALATIONS TWICE A DAY 3 Inhaler 2    montelukast (SINGULAIR) 10 mg tablet TAKE 1 TABLET DAILY 90 Tab 1    albuterol (PROVENTIL VENTOLIN) 2.5 mg /3 mL (0.083 %) nebulizer solution 3 mL by Nebulization route every four (4) hours as needed for Wheezing. 24 Each 1    Nebulizer & Compressor machine Use as directed 1 Each 0    ipratropium (ATROVENT) 0.02 % nebulizer solution 2.5 mL by Nebulization route as needed for Wheezing (every 6 hours as needed). 62.5 mL 0    albuterol (VENTOLIN HFA) 90 mcg/actuation inhaler Take 2 Puffs by inhalation every six (6) hours as needed for Wheezing or Shortness of Breath (may also use for cough). 1 Inhaler 3     Allergies   Allergen Reactions    Loratadine Nausea Only and Other (comments)     Muscle weakness     Family History   Problem Relation Age of Onset    Hypertension Mother     Stroke Maternal Grandmother     Cancer Paternal Grandmother      breast cancer     Social History   Substance Use Topics    Smoking status: Former Smoker     Packs/day: 1.00     Years: 18.00     Types: Cigarettes     Quit date: 7/24/1988    Smokeless tobacco: Never Used      Comment: Quit in 1988    Alcohol use Yes      Comment: rarely     Diet, Lifestyle: The patient is prescribed and follows a special diet. Exercise level: sedentary    Depression Risk Screen     PHQ over the last two weeks 9/25/2018   Little interest or pleasure in doing things Not at all   Feeling down, depressed, irritable, or hopeless Not at all   Total Score PHQ 2 0     Alcohol Risk Screen   You do not drink alcohol or very rarely. Functional Ability and Level of Safety   Hearing Loss  Hearing is good. Vision Screening  Vision is good. No exam data present   Wears glasses, sees the eye doctor regularly     Activities of Daily Living  The home contains: handrails and grab bars  Patient does total self care    Fall Risk Screen  Fall Risk Assessment, last 12 mths 9/25/2018   Able to walk? Yes   Fall in past 12 months? Yes   Fall with injury?  Yes   Number of falls in past 12 months 1   Fall Risk Score 2       Abuse Screen  Patient is not abused      Patient Care Team   Patient Care Team:  Tico Pennington NP as PCP - General (Nurse Practitioner)  Starla Vincent MD (Hematology and Oncology)  Vangie Rudd MD (Gastroenterology)     End of Life Planning   Advanced care planning directives were discussed with the patient and /or family/caregiver. Assessment/Plan   Education and counseling provided:  Are appropriate based on today's review and evaluation  Influenza Vaccine  Screening Mammography  Screening Pap and pelvic (covered once every 2 years)  Screening for glaucoma    Diagnoses and all orders for this visit:      1. Welcome to Medicare preventive visit    2. Screening for alcoholism  -     Annual  Alcohol Screen 15 min ()    3. Screening for depression  -     Depression Screen Annual    4. Encounter for screening mammogram for malignant neoplasm of breast  -     Bilateral Digital Screening Mammography; Future    5. Encounter for immunization  -     Influenza Admin (Vara Morelle)  -     Influenza virus vaccine (FLUZONE HIGH DOSE) PF ([id number])      Health Maintenance Due   Topic Date Due    Hepatitis C Screening  1950    Shingrix Vaccine Age 50> (1 of 2) 04/21/2002    BREAST CANCER SCRN MAMMOGRAM  04/14/2016    GLAUCOMA SCREENING Q2Y  04/21/2017    Bone Densitometry (Dexa) Screening  04/21/2017    Pneumococcal 65+ Low/Medium Risk (1 of 2 - PCV13) 04/21/2017     Next AWV in 12 months    I have discussed the diagnosis with the patient and the intended plan as seen in the above orders. The patient has received an after-visit summary and questions were answered concerning future plans. Patient conveyed understanding of the plan at the time of the visit.     Tico Pennington NP  09/30/18

## 2018-09-25 NOTE — MR AVS SNAPSHOT
[address] [address] 
[phone] Patient: Benjie Carpio MRN: [id number] Visit Information Date & Time Provider Department Dept. Phone Encounter #  
 9/25/2018  8:45 AM Андрей Petty  [facility] [phone] [id number] Your Appointments 11/1/2018 10:00 AM  
ESTABLISHED PATIENT with Tia Mclean MD  
[address] Sargent at [address]) Appt Note: Raddin, iron deficiency fu.  
 [address] [facility], [address] [facility] [id number] [phone]  
  
   
 [address] [facility], [facility] 100[address] [facility] Upcoming Health Maintenance Date Due Hepatitis C Screening 1950 Shingrix Vaccine Age 50> (1 of 2) 4/21/2002 BREAST CANCER SCRN MAMMOGRAM 4/14/2016 GLAUCOMA SCREENING Q2Y 4/21/2017 Bone Densitometry (Dexa) Screening 4/21/2017 Pneumococcal 65+ Low/Medium Risk (1 of 2 - PCV13) 4/21/2017 Influenza Age 5 to Adult 8/1/2018 DTaP/Tdap/Td series (2 - Td) 4/8/2028 COLONOSCOPY 5/31/2028 Allergies as of 9/25/2018  Review Complete On: 9/25/2018 By: Андрей Petty NP Severity Noted Reaction Type Reactions Loratadine  12/08/2011    Nausea Only, Other (comments) Muscle weakness Current Immunizations  Reviewed on 3/1/2018 Name Date Hib (PRP-T) 6/30/2015 Influenza High Dose Vaccine PF  Incomplete Influenza Vaccine 10/1/2017, 12/17/2015, 10/22/2015 Influenza Vaccine Split 10/26/2012, 10/7/2011 Tdap 4/8/2018  3:59 PM  
  
 Not reviewed this visit You Were Diagnosed With   
  
 Codes Comments Essential hypertension with goal blood pressure less than 130/80    -  Primary ICD-10-CM: I10 
ICD-9-CM: 401.9 Chronic obstructive pulmonary disease, unspecified COPD type ([facility]     ICD-10-CM: J44.9 ICD-9-CM: 133 Iron deficiency anemia due to chronic blood loss     ICD-10-CM: D50.0 ICD-9-CM: 280.0 Shakiness     ICD-10-CM: R25.1 ICD-9-CM: 781.0 Hypercholesterolemia     ICD-10-CM: E78.00 ICD-9-CM: 272.0 Polyphagia     ICD-10-CM: R63.2 ICD-9-CM: 783.6 Hyperglycemia     ICD-10-CM: R73.9 ICD-9-CM: 790.29 Welcome to Medicare preventive visit     ICD-10-CM: Z00.00 ICD-9-CM: V70.0 Screening for alcoholism     ICD-10-CM: Z13.89 ICD-9-CM: V79.1 Screening for depression     ICD-10-CM: Z13.89 ICD-9-CM: V79.0 Encounter for screening mammogram for malignant neoplasm of breast     ICD-10-CM: Z12.31 
ICD-9-CM: V76.12 Encounter for immunization     ICD-10-CM: D59 ICD-9-CM: V03.89 Vitals BP Pulse Temp Resp Height(growth percentile) Weight(growth percentile) 132/83 62 98.1 °F (36.7 °C) (Oral) 18 5' 4\" (1.626 m) 233 lb 4.8 oz (105.8 kg) LMP SpO2 BMI OB Status Smoking Status 01/01/1996 93% 40.05 kg/m2 Hysterectomy Former Smoker Vitals History BMI and BSA Data Body Mass Index Body Surface Area 40.05 kg/m 2 2.19 m 2 Preferred Pharmacy Pharmacy Name Phone Deandre Hernández, [facility] [phone] Your Updated Medication List  
  
   
This list is accurate as of 9/25/18  9:46 AM.  Always use your most recent med list.  
  
  
  
  
 * albuterol 90 mcg/actuation inhaler Commonly known as:  VENTOLIN HFA Take 2 Puffs by inhalation every six (6) hours as needed for Wheezing or Shortness of Breath (may also use for cough). * albuterol 2.5 mg /3 mL (0.083 %) nebulizer solution Commonly known as:  PROVENTIL VENTOLIN  
3 mL by Nebulization route every four (4) hours as needed for Wheezing. atorvastatin 10 mg tablet Commonly known as:  LIPITOR  
TAKE 1 TABLET DAILY DULERA 200-5 mcg/actuation HFA inhaler Generic drug:  mometasone-formoterol USE 2 INHALATIONS TWICE A DAY  
  
 ipratropium 0.02 % Soln Commonly known as:  ATROVENT  
2.5 mL by Nebulization route as needed for Wheezing (every 6 hours as needed). lansoprazole 30 mg capsule Commonly known as:  PREVACID TAKE 1 CAPSULE DAILY BEFORE BREAKFAST  
  
 levocetirizine 5 mg tablet Commonly known as:  Violet Leola TAKE 1 TABLET DAILY losartan 25 mg tablet Commonly known as:  COZAAR  
TAKE 1 TABLET DAILY  
  
 montelukast 10 mg tablet Commonly known as:  SINGULAIR  
TAKE 1 TABLET DAILY Nebulizer & Compressor machine Use as directed PARoxetine 20 mg tablet Commonly known as:  PAXIL TAKE 1 TABLET BY MOUTH DAILY potassium chloride 20 mEq tablet Commonly known as:  K-DUR, KLOR-CON  
TAKE 1 TABLET THREE TIMES A DAY  
  
 triamterene-hydroCHLOROthiazide 37.5-25 mg per tablet Commonly known as:  Ky Rowe TAKE 1 TABLET DAILY * Notice: This list has 2 medication(s) that are the same as other medications prescribed for you. Read the directions carefully, and ask your doctor or other care provider to review them with you. Prescriptions Sent to Pharmacy Refills  
 losartan (COZAAR) 25 mg tablet 1 Sig: TAKE 1 TABLET DAILY Class: Normal  
 Pharmacy: [address] Ph #: [phone]  
 potassium chloride (K-DUR, KLOR-CON) 20 mEq tablet 1 Sig: TAKE 1 TABLET THREE TIMES A DAY Class: Normal  
 Pharmacy: [address] Ph #: [phone]  
 atorvastatin (LIPITOR) 10 mg tablet 1 Sig: TAKE 1 TABLET DAILY Class: Normal  
 Pharmacy: [address] Ph #: [phone]  
 levocetirizine (XYZAL) 5 mg tablet 1 Sig: TAKE 1 TABLET DAILY Class: Normal  
 Pharmacy: [address] [place] [address], [address] [place] [address] Ph #: [phone] We Performed the Following ADMIN INFLUENZA VIRUS VAC [ HCPCS] CBC WITH AUTOMATED DIFF [08739 CPT(R)] Halima 68 [[id number] HCPCS] HEMOGLOBIN A1C WITH EAG [69015 CPT(R)] INFLUENZA VIRUS VACCINE, HIGH DOSE SEASONAL, PRESERVATIVE FREE [15347 CPT(R)] LIPID PANEL [22345 CPT(R)] METABOLIC PANEL, COMPREHENSIVE [73625 CPT(R)] CO ANNUAL ALCOHOL SCREEN 15 MIN [id number] HCPCS] TSH 3RD GENERATION [75437 CPT(R)] To-Do List   
 09/28/2018 Imaging:  ODESSA MAMMO BI SCREENING INCL CAD Patient Instructions Medicare Wellness Visit, Female The best way to live healthy is to have a lifestyle where you eat a well-balanced diet, exercise regularly, limit alcohol use, and quit all forms of tobacco/nicotine, if applicable. Regular preventive services are another way to keep healthy. Preventive services (vaccines, screening tests, monitoring & exams) can help personalize your care plan, which helps you manage your own care. Screening tests can find health problems at the earliest stages, when they are easiest to treat. Noe Secsandra follows the current, evidence-based guidelines published by the [facility] Omid [place] (USPSTF) when recommending preventive services for our patients. Because we follow these guidelines, sometimes recommendations change over time as research supports it. (For example, mammograms used to be recommended annually. Even though Medicare will still pay for an annual mammogram, the newer guidelines recommend a mammogram every two years for women of average risk.) Of course, you and your doctor may decide to screen more often for some diseases, based on your risk and your health status. Preventive services for you include: - Medicare offers their members a free annual wellness visit, which is time for you and your primary care provider to discuss and plan for your preventive service needs. Take advantage of this benefit every year! -All adults over the age of 72 should receive the recommended pneumonia vaccines. Current USPSTF guidelines recommend a series of two vaccines for the best pneumonia protection.  
-All adults should have a flu vaccine yearly and a tetanus vaccine every 10 years. All adults age 61 and older should receive a shingles vaccine once in their lifetime.   
-A bone mass density test is recommended when a woman turns 65 to screen for osteoporosis. This test is only recommended one time, as a screening. Some providers will use this same test as a disease monitoring tool if you already have osteoporosis. -All adults age 38-68 who are overweight should have a diabetes screening test once every three years.  
-Other screening tests and preventive services for persons with diabetes include: an eye exam to screen for diabetic retinopathy, a kidney function test, a foot exam, and stricter control over your cholesterol.  
-Cardiovascular screening for adults with routine risk involves an electrocardiogram (ECG) at intervals determined by your doctor.  
-Colorectal cancer screenings should be done for adults age 54-65 with no increased risk factors for colorectal cancer. There are a number of acceptable methods of screening for this type of cancer. Each test has its own benefits and drawbacks. Discuss with your doctor what is most appropriate for you during your annual wellness visit. The different tests include: colonoscopy (considered the best screening method), a fecal occult blood test, a fecal DNA test, and sigmoidoscopy. -Breast cancer screenings are recommended every other year for women of normal risk, age 54-69. 
-Cervical cancer screenings for women over age 72 are only recommended with certain risk factors.  
-All adults born between [facility] should be screened once for Hepatitis C. Here is a list of your current Health Maintenance items (your personalized list of preventive services) with a due date: Health Maintenance Due Topic Date Due  
 Hepatitis C Test  1950  Shingles Vaccine (1 of 2) 04/21/2002  Breast Cancer Screening  04/14/2016  Glaucoma Screening   04/21/2017  Bone Mineral Density   04/21/2017  Pneumococcal Vaccine (1 of 2 - PCV13) 04/21/2017  Flu Vaccine  08/01/2018 Advance Care Planning: Care Instructions Your Care Instructions It can be hard to live with an illness that cannot be cured. But if your health is getting worse, you may want to make decisions about end-of-life care. Planning for the end of your life does not mean that you are giving up. It is a way to make sure that your wishes are met. Clearly stating your wishes can make it easier for your loved ones. Making plans while you are still able may also ease your mind and make your final days less stressful and more meaningful. Follow-up care is a key part of your treatment and safety. Be sure to make and go to all appointments, and call your doctor if you are having problems. It's also a good idea to know your test results and keep a list of the medicines you take. What can you do to plan for the end of life? · You can bring these issues up with your doctor. You do not need to wait until your doctor starts the conversation. You might start with \"I would not be willing to live with . Leta Cohen \" When you complete this sentence it helps your doctor understand your wishes. · Talk openly and honestly with your doctor. This is the best way to understand the decisions you will need to make as your health changes. Know that you can always change your mind. · Ask your doctor about commonly used life-support measures. These include tube feedings, breathing machines, and fluids given through a vein (IV). Understanding these treatments will help you decide whether you want them.  
· You may choose to have these life-supporting treatments for a limited time. This allows a trial period to see whether they will help you. You may also decide that you want your doctor to take only certain measures to keep you alive. It is important to spell out these conditions so that your doctor and family understand them. · Talk to your doctor about how long you are likely to live. He or she may be able to give you an idea of what usually happens with your specific illness. · Think about preparing papers that state your wishes. This way there will not be any confusion about what you want. You can change your instructions at any time. Which papers should you prepare? Advance directives are legal papers that tell doctors how you want to be cared for at the end of your life. You do not need a  to write these papers. Ask your doctor or your state [facility] department for information on how to write your advance directives. They may have the forms for each of these types of papers. Make sure your doctor has a copy of these on file, and give a copy to a family member or close friend. · Consider a do-not-resuscitate order (DNR). This order asks that no extra treatments be done if your heart stops or you stop breathing. Extra treatments may include cardiopulmonary resuscitation (CPR), electrical shock to restart your heart, or a machine to breathe for you. If you decide to have a DNR order, ask your doctor to explain and write it. Place the order in your home where everyone can easily see it. · Consider a living will. A living will explains your wishes about life support and other treatments at the end of your life if you become unable to speak for yourself. Living vergara tell doctors to use or not use treatments that would keep you alive. You must have one or two witnesses or a notary present when you sign this form. · Consider a durable power of  for health care. This allows you to name a person to make decisions about your care if you are not able to. Most people ask a close friend or family member. Talk to this person about the kinds of treatments you want and those that you do not want. Make sure this person understands your wishes. These legal papers are simple to change. Tell your doctor what you want to change, and ask him or her to make a note in your medical file. Give your family updated copies of the papers. Where can you learn more? Go to http://negrita-zlueyka.info/. Enter P184 in the search box to learn more about \"Advance Care Planning: Care Instructions. \" Current as of: November 17, 2016 Content Version: 11.3 © 1451-1534 [employer]. Care instructions adapted under license by [employer] (which disclaims liability or warranty for this information). If you have questions about a medical condition or this instruction, always ask your healthcare professional. [address] any warranty or liability for your use of this information. Introducing [facility]! Austin Littlejohn introduces Baboo patient portal. Now you can access parts of your medical record, email your doctor's office, and request medication refills online. 1. In your internet browser, go to [email or web address] 2. Click on the First Time User? Click Here link in the Sign In box. You will see the New Member Sign Up page. 3. Enter your Baboo Access Code exactly as it appears below. You will not need to use this code after youve completed the sign-up process. If you do not sign up before the expiration date, you must request a new code. · Baboo Access Code: [email or web address] Expires: 12/24/2018  9:45 AM 
 
4. Enter the last four digits of your Social Security Number (xxxx) and Date of Birth (mm/dd/yyyy) as indicated and click Submit. You will be taken to the next sign-up page. 5. Create a Baboo ID.  This will be your Baboo login ID and cannot be changed, so think of one that is secure and easy to remember. 6. Create a AOMi password. You can change your password at any time. 7. Enter your Password Reset Question and Answer. This can be used at a later time if you forget your password. 8. Enter your e-mail address. You will receive e-mail notification when new information is available in [address] 9. Click Sign Up. You can now view and download portions of your medical record. 10. Click the Download Summary menu link to download a portable copy of your medical information. If you have questions, please visit the Frequently Asked Questions section of the AOMi website. Remember, AOMi is NOT to be used for urgent needs. For medical emergencies, dial 911. Now available from your iPhone and Android! Please provide this summary of care documentation to your next provider. Your primary care clinician is listed as Gerardo Malone. If you have any questions after today's visit, please call [phone].

## 2018-09-25 NOTE — PATIENT INSTRUCTIONS
Medicare Wellness Visit, Female     The best way to live healthy is to have a lifestyle where you eat a well-balanced diet, exercise regularly, limit alcohol use, and quit all forms of tobacco/nicotine, if applicable. Regular preventive services are another way to keep healthy. Preventive services (vaccines, screening tests, monitoring & exams) can help personalize your care plan, which helps you manage your own care. Screening tests can find health problems at the earliest stages, when they are easiest to treat. Noe Salter follows the current, evidence-based guidelines published by the [facility] Omid [place] ([facility]) when recommending preventive services for our patients. Because we follow these guidelines, sometimes recommendations change over time as research supports it. (For example, mammograms used to be recommended annually. Even though Medicare will still pay for an annual mammogram, the newer guidelines recommend a mammogram every two years for women of average risk.)  Of course, you and your doctor may decide to screen more often for some diseases, based on your risk and your health status. Preventive services for you include:  - Medicare offers their members a free annual wellness visit, which is time for you and your primary care provider to discuss and plan for your preventive service needs. Take advantage of this benefit every year!  -All adults over the age of 72 should receive the recommended pneumonia vaccines. Current USPSTF guidelines recommend a series of two vaccines for the best pneumonia protection.   -All adults should have a flu vaccine yearly and a tetanus vaccine every 10 years. All adults age 61 and older should receive a shingles vaccine once in their lifetime.    -A bone mass density test is recommended when a woman turns 65 to screen for osteoporosis. This test is only recommended one time, as a screening.  Some providers will use this same test as a disease monitoring tool if you already have osteoporosis. -All adults age 38-68 who are overweight should have a diabetes screening test once every three years.   -Other screening tests and preventive services for persons with diabetes include: an eye exam to screen for diabetic retinopathy, a kidney function test, a foot exam, and stricter control over your cholesterol.   -Cardiovascular screening for adults with routine risk involves an electrocardiogram (ECG) at intervals determined by your doctor.   -Colorectal cancer screenings should be done for adults age 54-65 with no increased risk factors for colorectal cancer. There are a number of acceptable methods of screening for this type of cancer. Each test has its own benefits and drawbacks. Discuss with your doctor what is most appropriate for you during your annual wellness visit. The different tests include: colonoscopy (considered the best screening method), a fecal occult blood test, a fecal DNA test, and sigmoidoscopy. -Breast cancer screenings are recommended every other year for women of normal risk, age 54-69.  -Cervical cancer screenings for women over age 72 are only recommended with certain risk factors.   -All adults born between [facility] should be screened once for Hepatitis C. Here is a list of your current Health Maintenance items (your personalized list of preventive services) with a due date:  Health Maintenance Due   Topic Date Due    Hepatitis C Test  1950    Shingles Vaccine (1 of 2) 04/21/2002    Breast Cancer Screening  04/14/2016    Glaucoma Screening   04/21/2017    Bone Mineral Density   04/21/2017    Pneumococcal Vaccine (1 of 2 - PCV13) 04/21/2017    Flu Vaccine  08/01/2018              Advance Care Planning: Care Instructions  Your Care Instructions  It can be hard to live with an illness that cannot be cured. But if your health is getting worse, you may want to make decisions about end-of-life care.  Planning for the end of your life does not mean that you are giving up. It is a way to make sure that your wishes are met. Clearly stating your wishes can make it easier for your loved ones. Making plans while you are still able may also ease your mind and make your final days less stressful and more meaningful. Follow-up care is a key part of your treatment and safety. Be sure to make and go to all appointments, and call your doctor if you are having problems. It's also a good idea to know your test results and keep a list of the medicines you take. What can you do to plan for the end of life? · You can bring these issues up with your doctor. You do not need to wait until your doctor starts the conversation. You might start with \"I would not be willing to live with . Peggyann Gang Peggyann Gang Pegissaann Gang \" When you complete this sentence it helps your doctor understand your wishes. · Talk openly and honestly with your doctor. This is the best way to understand the decisions you will need to make as your health changes. Know that you can always change your mind. · Ask your doctor about commonly used life-support measures. These include tube feedings, breathing machines, and fluids given through a vein (IV). Understanding these treatments will help you decide whether you want them. · You may choose to have these life-supporting treatments for a limited time. This allows a trial period to see whether they will help you. You may also decide that you want your doctor to take only certain measures to keep you alive. It is important to spell out these conditions so that your doctor and family understand them. · Talk to your doctor about how long you are likely to live. He or she may be able to give you an idea of what usually happens with your specific illness. · Think about preparing papers that state your wishes. This way there will not be any confusion about what you want. You can change your instructions at any time.   Which papers should you prepare? Advance directives are legal papers that tell doctors how you want to be cared for at the end of your life. You do not need a  to write these papers. Ask your doctor or your state [facility] department for information on how to write your advance directives. They may have the forms for each of these types of papers. Make sure your doctor has a copy of these on file, and give a copy to a family member or close friend. · Consider a do-not-resuscitate order (DNR). This order asks that no extra treatments be done if your heart stops or you stop breathing. Extra treatments may include cardiopulmonary resuscitation (CPR), electrical shock to restart your heart, or a machine to breathe for you. If you decide to have a DNR order, ask your doctor to explain and write it. Place the order in your home where everyone can easily see it. · Consider a living will. A living will explains your wishes about life support and other treatments at the end of your life if you become unable to speak for yourself. Living vergara tell doctors to use or not use treatments that would keep you alive. You must have one or two witnesses or a notary present when you sign this form. · Consider a durable power of  for health care. This allows you to name a person to make decisions about your care if you are not able to. Most people ask a close friend or family member. Talk to this person about the kinds of treatments you want and those that you do not want. Make sure this person understands your wishes. These legal papers are simple to change. Tell your doctor what you want to change, and ask him or her to make a note in your medical file. Give your family updated copies of the papers. Where can you learn more? Go to http://negrita-zuleyka.info/. Enter P184 in the search box to learn more about \"Advance Care Planning: Care Instructions. \"  Current as of: November 17, 2016  Content Version: 11.3  © 3189-9871 [employer]. Care instructions adapted under license by [employer] (which disclaims liability or warranty for this information). If you have questions about a medical condition or this instruction, always ask your healthcare professional. [address] any warranty or liability for your use of this information.

## 2018-09-26 LAB
ALBUMIN SERPL-MCNC: 4.5 G/DL (ref 3.6–4.8)
ALBUMIN/GLOB SERPL: 1.6 {RATIO} (ref 1.2–2.2)
ALP SERPL-CCNC: 77 IU/L (ref 39–117)
ALT SERPL-CCNC: 21 IU/L (ref 0–32)
AST SERPL-CCNC: 23 IU/L (ref 0–40)
BASOPHILS # BLD AUTO: 0.1 X10E3/UL (ref 0–0.2)
BASOPHILS NFR BLD AUTO: 1 %
BILIRUB SERPL-MCNC: 0.5 MG/DL (ref 0–1.2)
BUN SERPL-MCNC: 10 MG/DL (ref 8–27)
BUN/CREAT SERPL: 13 (ref 12–28)
CALCIUM SERPL-MCNC: 9.4 MG/DL (ref 8.7–10.3)
CHLORIDE SERPL-SCNC: 103 MMOL/L (ref 96–106)
CHOLEST SERPL-MCNC: 152 MG/DL (ref 100–199)
CO2 SERPL-SCNC: 21 MMOL/L (ref 20–29)
CREAT SERPL-MCNC: 0.77 MG/DL (ref 0.57–1)
EOSINOPHIL # BLD AUTO: 0.3 X10E3/UL (ref 0–0.4)
EOSINOPHIL NFR BLD AUTO: 4 %
ERYTHROCYTE [DISTWIDTH] IN BLOOD BY AUTOMATED COUNT: 14.6 % (ref 12.3–15.4)
EST. AVERAGE GLUCOSE BLD GHB EST-MCNC: 100 MG/DL
GLOBULIN SER CALC-MCNC: 2.8 G/DL (ref 1.5–4.5)
GLUCOSE SERPL-MCNC: 90 MG/DL (ref 65–99)
HBA1C MFR BLD: 5.1 % (ref 4.8–5.6)
HCT VFR BLD AUTO: 47.7 % (ref 34–46.6)
HDLC SERPL-MCNC: 33 MG/DL
HGB BLD-MCNC: 16.2 G/DL (ref 11.1–15.9)
IMM GRANULOCYTES # BLD: 0 X10E3/UL (ref 0–0.1)
IMM GRANULOCYTES NFR BLD: 0 %
INTERPRETATION, 910389: NORMAL
LDLC SERPL CALC-MCNC: 73 MG/DL (ref 0–99)
LYMPHOCYTES # BLD AUTO: 1.5 X10E3/UL (ref 0.7–3.1)
LYMPHOCYTES NFR BLD AUTO: 16 %
MCH RBC QN AUTO: 29.9 PG (ref 26.6–33)
MCHC RBC AUTO-ENTMCNC: 34 G/DL (ref 31.5–35.7)
MCV RBC AUTO: 88 FL (ref 79–97)
MONOCYTES # BLD AUTO: 0.9 X10E3/UL (ref 0.1–0.9)
MONOCYTES NFR BLD AUTO: 9 %
NEUTROPHILS # BLD AUTO: 6.5 X10E3/UL (ref 1.4–7)
NEUTROPHILS NFR BLD AUTO: 70 %
PLATELET # BLD AUTO: 168 X10E3/UL (ref 150–379)
POTASSIUM SERPL-SCNC: 4 MMOL/L (ref 3.5–5.2)
PROT SERPL-MCNC: 7.3 G/DL (ref 6–8.5)
RBC # BLD AUTO: 5.41 X10E6/UL (ref 3.77–5.28)
SODIUM SERPL-SCNC: 146 MMOL/L (ref 134–144)
TRIGL SERPL-MCNC: 232 MG/DL (ref 0–149)
TSH SERPL DL<=0.005 MIU/L-ACNC: 3.09 UIU/ML (ref 0.45–4.5)
VLDLC SERPL CALC-MCNC: 46 MG/DL (ref 5–40)
WBC # BLD AUTO: 9.4 X10E3/UL (ref 3.4–10.8)

## 2018-10-02 NOTE — PROGRESS NOTES
There is no anemia, in fact your hgb is just above normal.  Your A1C is normal.   Your sodium is just above normal- recommend you drink plenty of water throughout the day. Your triglycerides are high- this indicates your diet is too high in sugar and simple carbs. Recommend reducing as we discussed at your appt and increasing protein and fiber in your diet. If changing your diet does stop the episodes of shaking and cloudy thinking you have been having, please make an appt to come in and discuss with Dr. El Boyer.

## 2018-10-11 ENCOUNTER — TELEPHONE (OUTPATIENT)
Dept: FAMILY MEDICINE CLINIC | Age: 68
End: 2018-10-11

## 2018-10-11 NOTE — TELEPHONE ENCOUNTER
Returned call to pt, verified   advised her that I did place a copy of results in the mail for her after several failed attempts to reach her by phone. I reviewed labs with pt, per provides note. Pt verbalized understanding and had no follow up questions at this time.

## 2018-10-26 ENCOUNTER — HOSPITAL ENCOUNTER (OUTPATIENT)
Dept: LAB | Age: 68
Discharge: HOME | End: 2018-10-26
Payer: MEDICARE

## 2018-10-26 PROCEDURE — 82728 ASSAY OF FERRITIN: CPT

## 2018-10-26 PROCEDURE — 36415 COLL VENOUS BLD VENIPUNCTURE: CPT

## 2018-10-26 PROCEDURE — 85025 COMPLETE CBC W/AUTO DIFF WBC: CPT

## 2018-10-26 PROCEDURE — 83550 IRON BINDING TEST: CPT

## 2018-10-27 LAB
BASOPHILS # BLD AUTO: 0.1 X10E3/UL (ref 0–0.2)
BASOPHILS NFR BLD AUTO: 1 %
EOSINOPHIL # BLD AUTO: 0.4 X10E3/UL (ref 0–0.4)
EOSINOPHIL NFR BLD AUTO: 4 %
ERYTHROCYTE [DISTWIDTH] IN BLOOD BY AUTOMATED COUNT: 14.3 % (ref 12.3–15.4)
FERRITIN SERPL-MCNC: 29 NG/ML (ref 15–150)
HCT VFR BLD AUTO: 44.4 % (ref 34–46.6)
HGB BLD-MCNC: 15.1 G/DL (ref 11.1–15.9)
IMM GRANULOCYTES # BLD: 0 X10E3/UL (ref 0–0.1)
IMM GRANULOCYTES NFR BLD: 0 %
IRON SATN MFR SERPL: 19 % (ref 15–55)
IRON SERPL-MCNC: 63 UG/DL (ref 27–139)
LYMPHOCYTES # BLD AUTO: 1.8 X10E3/UL (ref 0.7–3.1)
LYMPHOCYTES NFR BLD AUTO: 19 %
MCH RBC QN AUTO: 29.8 PG (ref 26.6–33)
MCHC RBC AUTO-ENTMCNC: 34 G/DL (ref 31.5–35.7)
MCV RBC AUTO: 88 FL (ref 79–97)
MONOCYTES # BLD AUTO: 0.9 X10E3/UL (ref 0.1–0.9)
MONOCYTES NFR BLD AUTO: 10 %
NEUTROPHILS # BLD AUTO: 6.3 X10E3/UL (ref 1.4–7)
NEUTROPHILS NFR BLD AUTO: 66 %
PLATELET # BLD AUTO: 188 X10E3/UL (ref 150–379)
RBC # BLD AUTO: 5.06 X10E6/UL (ref 3.77–5.28)
TIBC SERPL-MCNC: 330 UG/DL (ref 250–450)
UIBC SERPL-MCNC: 267 UG/DL (ref 118–369)
WBC # BLD AUTO: 9.6 X10E3/UL (ref 3.4–10.8)

## 2018-10-29 NOTE — PROGRESS NOTES
[facility] [place] [facility] [place] [address]  [address] [facility], [address] [place] [address] Marks: [phone]  F: [phone]      Reason for Visit:   Tammi Nieto is a 79 y.o. female who is seen for follow up of iron deficiency anemia. History of Present Illness:   She had repeat colonoscopy and EGD and capsule endoscopy with Dr. Lakhwinder Lopez in May. She reports a miserable time with the bowel prep. An AVM was found and treated. Still with some fatigue, but improved. No bleeding. No melena. PAST HISTORY: The following sections were reviewed and updated in the EMR as appropriate: PMH, SH, FH, Medications, Allergies. She is accompanied by her  today. She works part time at an office. She lives in [place] with her , grown son, and her grandson is with them part-time. Allergies   Allergen Reactions    Loratadine Nausea Only and Other (comments)     Muscle weakness      Review of Systems: A complete review of systems was obtained, reviewed, and scanned into the EMR. Pertinent findings reviewed above. Physical Exam:     Visit Vitals  /82 (BP 1 Location: Right arm, BP Patient Position: Sitting)   Pulse 60   Temp 97.5 °F (36.4 °C) (Oral)   Resp 20   Ht 5' 4\" (1.626 m)   Wt 238 lb (108 kg)   LMP 01/01/1996   SpO2 98%   BMI 40.85 kg/m²     General: No distress  Eyes: PERRLA, anicteric sclerae  HENT: Atraumatic, OP clear  Neck: Supple  Lymphatic: No cervical, supraclavicular, or inguinal adenopathy  Respiratory: CTAB, normal respiratory effort  CV: Normal rate, regular rhythm, no murmurs, no peripheral edema  GI: Soft, nontender, nondistended, no masses, no hepatomegaly, no splenomegaly  MS: Normal gait and station. Digits without clubbing or cyanosis. Skin: No rashes, ecchymoses, or petechiae. Normal temperature, turgor, and texture.   Psych: Alert, oriented, appropriate affect, normal judgment/insight    Results:     Lab Results   Component Value Date/Time    WBC 9.6 10/26/2018 02:56 PM    HGB 15.1 10/26/2018 02:56 PM    HCT 44.4 10/26/2018 02:56 PM    PLATELET 832 13/61/9962 02:56 PM    MCV 88 10/26/2018 02:56 PM    ABS. NEUTROPHILS 6.3 10/26/2018 02:56 PM    Hemoglobin (POC) 8.8 02/20/2014 11:34 AM    Hemoglobin (POC) 10.2 (L) 10/15/2011 03:12 PM    Hematocrit (POC) 30 (L) 10/15/2011 03:12 PM     Lab Results   Component Value Date/Time    Sodium 146 (H) 09/25/2018 09:50 AM    Potassium 4.0 09/25/2018 09:50 AM    Chloride 103 09/25/2018 09:50 AM    CO2 21 09/25/2018 09:50 AM    Glucose 90 09/25/2018 09:50 AM    BUN 10 09/25/2018 09:50 AM    Creatinine 0.77 09/25/2018 09:50 AM    GFR est AA 93 09/25/2018 09:50 AM    GFR est non-AA 81 09/25/2018 09:50 AM    Calcium 9.4 09/25/2018 09:50 AM    Sodium (POC) 141 10/15/2011 03:12 PM    Potassium (POC) 3.5 10/15/2011 03:12 PM    Chloride (POC) 104 10/15/2011 03:12 PM    Glucose (POC) 83 07/26/2016 12:03 PM    Glucose POC 90 12/05/2016 11:08 AM    BUN (POC) 10 10/15/2011 03:12 PM    Creatinine (POC) 1.1 10/15/2011 03:12 PM    Calcium, ionized (POC) 1.11 (L) 10/15/2011 03:12 PM     Lab Results   Component Value Date/Time    Bilirubin, total 0.5 09/25/2018 09:50 AM    AST (SGOT) 23 09/25/2018 09:50 AM    Alk.  phosphatase 77 09/25/2018 09:50 AM    Protein, total 7.3 09/25/2018 09:50 AM    Albumin 4.5 09/25/2018 09:50 AM    Globulin 3.7 07/26/2016 12:36 PM    A-G Ratio 1.6 09/25/2018 09:50 AM       Lab Results   Component Value Date/Time    Reticulocyte count 1.9 02/20/2014 10:50 AM    Iron % saturation 19 10/26/2018 02:56 PM    TIBC 330 10/26/2018 02:56 PM    Ferritin 29 10/26/2018 02:56 PM    Vitamin B12 380 08/21/2012 09:45 AM    Folate 15.5 08/21/2012 09:45 AM    Haptoglobin 101 02/20/2014 10:50 AM     (H) 12/08/2011 02:38 PM    Sed rate (ESR) 5 05/05/2014 10:40 AM    TSH 3.090 09/25/2018 09:50 AM    INR 1.0 10/15/2011 03:07 PM    INR (POC) 1.0 07/26/2016 12:07 PM    Lipase 70 (L) 05/10/2012 12:30 PM     HGB (g/dL)   Date Value   10/26/2018 15.1   09/25/2018 16.2 (H)   04/25/2018 15.6   01/16/2018 11.1   07/17/2017 12.3   04/17/2017 13.8   01/05/2017 15.1   07/27/2016 13.0   07/26/2016 13.9   07/15/2016 13.7   05/03/2016 9.0 (L)   12/07/2015 12.3   06/03/2015 14.4   03/17/2015 14.9   12/08/2014 16.4 (H)   06/11/2014 15.5   05/05/2014 14.0   04/11/2014 13.4   03/05/2014 9.2 (L)   02/18/2014 8.5 (L)   04/09/2013 12.8   07/16/2012 11.6   05/11/2012 11.9   05/10/2012 13.2   01/19/2012 14.0   12/08/2011 13.6   10/15/2011 8.7 (L)   09/29/2011 8.5 (L)       Ferritin   Date Value   10/26/2018 29 ng/mL   04/25/2018 72 ng/mL   01/16/2018 8 ng/mL (L)   07/17/2017 12 ng/mL (L)   01/05/2017 24 ng/mL   07/15/2016 18 ng/mL   05/03/2016 4 ng/mL (L)   12/07/2015 10 ng/mL (L)   06/03/2015 17 ng/mL   12/08/2014 79 ng/mL   06/11/2014 40 NG/ML   04/11/2014 19 NG/ML   02/20/2014 5 ng/mL (L)   10/07/2011 5 ng/mL (L)       Stool Blood 2/24/2014: negative  Celiac panel 12/8/2014: negative    EGD and Colonscopy with Dr. Ed Myers 4/9/2014: severe diverticulosis, nonbleeding  Capsule study 4/30/2014: normal    EGD and Colonoscopy with Dr. Joceline Coley 6/16/2016: large cecum AVM, one polyp (tubular adenoma), otherwise normal.  Capsule endoscopy 6/30/2016: normal    EGD and Colonoscopy with Dr. Joceline Coley 5/22/2018: Normal esophagus, stomach, duodenum, jejunum. AVM in colon treated. Assessment:   1) Anemia, Iron Deficiency  Recurrent. S/p feraheme in 5/2016 and 7/2016, and injectafer 2/2018. Currently resolved, though ferritin is trending down. She cannot tolerate oral iron. Continue to focus on iron rich diet. The source of her iron deficiency is likely recurrent AVMs. Celiac panel was negative, and no reason to suggest another malabsorption issue. No evidence of bleeding from elsewhere. She is at risk for recurrence in the future, so we will monitor. 2) AVMs  S/p treatment with GI in 6/2016 and 5/2018. She is at risk for recurrence. Monitor.     3) Fatigue  Improved some after IV iron, though not resolved even with HGB of 15, suggesting this is not entirely related.     4) COPD  Follows with pulmonary    Plan:     Iron rich diet  Labs in 12 months: CBC, iron profile, ferritin ([facility])  Return to see me in 12 months      Signed By: Priscila Sebastian MD

## 2018-11-01 ENCOUNTER — OFFICE VISIT (OUTPATIENT)
Dept: ONCOLOGY | Age: 68
End: 2018-11-01

## 2018-11-01 ENCOUNTER — HOSPITAL ENCOUNTER (OUTPATIENT)
Dept: MAMMOGRAPHY | Age: 68
Discharge: HOME | End: 2018-11-01
Attending: NURSE PRACTITIONER
Payer: MEDICARE

## 2018-11-01 VITALS
RESPIRATION RATE: 20 BRPM | DIASTOLIC BLOOD PRESSURE: 82 MMHG | HEART RATE: 60 BPM | SYSTOLIC BLOOD PRESSURE: 132 MMHG | BODY MASS INDEX: 40.63 KG/M2 | HEIGHT: 64 IN | OXYGEN SATURATION: 98 % | WEIGHT: 238 LBS | TEMPERATURE: 97.5 F

## 2018-11-01 DIAGNOSIS — Z12.31 ENCOUNTER FOR SCREENING MAMMOGRAM FOR MALIGNANT NEOPLASM OF BREAST: ICD-10-CM

## 2018-11-01 DIAGNOSIS — K55.21 AVM (ARTERIOVENOUS MALFORMATION) OF COLON WITH HEMORRHAGE: ICD-10-CM

## 2018-11-01 DIAGNOSIS — D50.0 IRON DEFICIENCY ANEMIA DUE TO CHRONIC BLOOD LOSS: Primary | ICD-10-CM

## 2018-11-01 PROCEDURE — 77067 SCR MAMMO BI INCL CAD: CPT

## 2019-01-16 ENCOUNTER — OFFICE VISIT (OUTPATIENT)
Dept: FAMILY MEDICINE CLINIC | Age: 69
End: 2019-01-16

## 2019-01-16 VITALS
RESPIRATION RATE: 20 BRPM | HEIGHT: 64 IN | SYSTOLIC BLOOD PRESSURE: 126 MMHG | BODY MASS INDEX: 39.09 KG/M2 | HEART RATE: 67 BPM | WEIGHT: 229 LBS | TEMPERATURE: 98 F | OXYGEN SATURATION: 95 % | DIASTOLIC BLOOD PRESSURE: 70 MMHG

## 2019-01-16 DIAGNOSIS — J44.9 CHRONIC OBSTRUCTIVE PULMONARY DISEASE, UNSPECIFIED COPD TYPE (HCC): ICD-10-CM

## 2019-01-16 DIAGNOSIS — J30.89 NON-SEASONAL ALLERGIC RHINITIS, UNSPECIFIED TRIGGER: Primary | ICD-10-CM

## 2019-01-16 DIAGNOSIS — E66.01 OBESITY, MORBID (HCC): ICD-10-CM

## 2019-01-16 NOTE — PROGRESS NOTES
1. Have you been to the ER, urgent care clinic since your last visit? Hospitalized since your last visit? No    2. Have you seen or consulted any other health care providers outside of the [address] [place] [address] since your last visit? Include any pap smears or colon screening.  No     Chief Complaint   Patient presents with    Ear Pain

## 2019-01-16 NOTE — PROGRESS NOTES
Chief Complaint   Patient presents with   [facility] in Ear     l. ear    Ear Fullness     bilateral ear     she is a 79y.o. year old female who presents for evalution. She started feeling like there is pressure behind her ears. The left ear was ringoing  Now the ringing is gone    Reviewed PmHx, RxHx, FmHx, SocHx, AllgHx and updated and dated in the chart. 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 9)    COPD (chronic obstructive pulmonary disease) ([address]    Hypertension    Hypercholesterolemia    Vertigo    GERD (gastroesophageal reflux disease)       Nurse notes were reviewed and copied and are correct  Review of Systems - negative except as listed above in the HPI    Objective:     Vitals:    01/16/19 0950   BP: 126/70   Pulse: 67   Resp: 20   Temp: 98 °F (36.7 °C)   TempSrc: Oral   SpO2: 95%   Weight: 229 lb (103.9 kg)   Height: 5' 4\" (1.626 m)        Physical Examination: General appearance - alert, well appearing, and in no distress  Mental status - alert, oriented to person, place, and time  Ears - bilateral TM's and external ear canals normal  Mouth - mucous membranes moist, pharynx normal without lesions  Neck - supple, no significant adenopathy  Chest - clear to auscultation, no wheezes, rales or rhonchi, symmetric air entry      Assessment/ Plan:   Diagnoses and all orders for this visit:    1. Non-seasonal allergic rhinitis, unspecified trigger    2. Obesity, morbid ([address]  She has started walking an d measuring steps    3.  Chronic obstructive pulmonary disease, unspecified COPD type ([address]     try pseudofed to relieve the pressure    Follow-up Disposition: Not on File    ICD-10-CM ICD-9-CM    1. Non-seasonal allergic rhinitis, unspecified trigger J30.89 477.8    2. Obesity, morbid ([facility] E66.01 278.01    3. Chronic obstructive pulmonary disease, unspecified COPD type ([facility] J44.9 496        I have discussed the diagnosis with the patient and the intended plan as seen in the above orders. The patient has received an after-visit summary and questions were answered concerning future plans. Medication Side Effects and Warnings were discussed with patient: yes  Patient Labs were reviewed and or requested: yes  Patient Past Records were reviewed and or requested: yes        There are no Patient Instructions on file for this visit.     The patient verbalizes understanding and agrees with the plan of care        Patient has the advanced directives booklet to review

## 2019-03-18 DIAGNOSIS — I10 ESSENTIAL HYPERTENSION WITH GOAL BLOOD PRESSURE LESS THAN 130/80: ICD-10-CM

## 2019-03-18 DIAGNOSIS — F41.9 ANXIETY: ICD-10-CM

## 2019-03-18 RX ORDER — LOSARTAN POTASSIUM 25 MG/1
TABLET ORAL
Qty: 90 TAB | Refills: 1 | Status: SHIPPED | OUTPATIENT
Start: 2019-03-18 | End: 2019-10-07 | Stop reason: SDUPTHER

## 2019-03-18 RX ORDER — PAROXETINE HYDROCHLORIDE 20 MG/1
TABLET, FILM COATED ORAL
Qty: 90 TAB | Refills: 3 | Status: SHIPPED | OUTPATIENT
Start: 2019-03-18 | End: 2020-01-27 | Stop reason: SDUPTHER

## 2019-04-05 RX ORDER — POTASSIUM CHLORIDE 20 MEQ/1
TABLET, EXTENDED RELEASE ORAL
Qty: 270 TAB | Refills: 1 | Status: SHIPPED | OUTPATIENT
Start: 2019-04-05 | End: 2019-11-20 | Stop reason: SDUPTHER

## 2019-05-06 ENCOUNTER — OFFICE VISIT (OUTPATIENT)
Dept: FAMILY MEDICINE CLINIC | Age: 69
End: 2019-05-06

## 2019-05-06 VITALS
HEIGHT: 64 IN | HEART RATE: 65 BPM | TEMPERATURE: 98.5 F | RESPIRATION RATE: 19 BRPM | WEIGHT: 226 LBS | BODY MASS INDEX: 38.58 KG/M2 | DIASTOLIC BLOOD PRESSURE: 90 MMHG | OXYGEN SATURATION: 95 % | SYSTOLIC BLOOD PRESSURE: 144 MMHG

## 2019-05-06 DIAGNOSIS — J06.9 VIRAL URI: Primary | ICD-10-CM

## 2019-05-06 DIAGNOSIS — J02.9 SORE THROAT: ICD-10-CM

## 2019-05-06 LAB
S PYO AG THROAT QL: NEGATIVE
VALID INTERNAL CONTROL?: YES

## 2019-05-06 NOTE — PROGRESS NOTES
1. Have you been to the ER, urgent care clinic since your last visit? Hospitalized since your last visit? No    2. Have you seen or consulted any other health care providers outside of the [address] [place] [address] since your last visit? Include any pap smears or colon screening.  No     Chief Complaint   Patient presents with    Sore Throat     x 1 day    Sinus Pain

## 2019-05-06 NOTE — PROGRESS NOTES
Chief Complaint   Patient presents with    Sore Throat     x 1 day    Sinus Pain     she is a 81y.o. year old female who presents for evalution. Reviewed PmHx, RxHx, FmHx, SocHx, AllgHx and updated and dated in the chart. 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 9)    COPD (chronic obstructive pulmonary disease) ([email or web address]    Hypertension    Hypercholesterolemia    Vertigo    GERD (gastroesophageal reflux disease)       Nurse notes were reviewed and copied and are correct  Review of Systems - negative except as listed above in the HPI    Objective:     Vitals:    05/06/19 0956   BP: 144/90   Pulse: 65   Resp: 19   Temp: 98.5 °F (36.9 °C)   TempSrc: Oral   SpO2: 95%   Weight: 226 lb (102.5 kg)   Height: 5' 4\" (1.626 m)       Physical Examination: General appearance - alert, well appearing, and in no distress  Mental status - alert, oriented to person, place, and time  Ears - bilateral TM's and external ear canals normal  Neck - supple, no significant adenopathy  Chest - clear to auscultation, no wheezes, rales or rhonchi, symmetric air entry  Heart - normal rate, regular rhythm, normal S1, S2, no murmurs, rubs, clicks or gallops      Assessment/ Plan:   Diagnoses and all orders for this visit:    1. Viral URI    2. Sore throat  -     AMB POC RAPID STREP A     treat symptoms  rto if not better in 3-5 days  Results for orders placed or performed in visit on 05/06/19   AMB POC RAPID STREP A   Result Value Ref Range    VALID INTERNAL CONTROL POC Yes     Group A Strep Ag Negative Negative           ICD-10-CM ICD-9-CM    1. Viral URI J06.9 465.9    2.  Sore throat J02.9 462 AMB POC RAPID STREP A       I have discussed the diagnosis with the patient and the intended plan as seen in the above orders. The patient has received an after-visit summary and questions were answered concerning future plans. Medication Side Effects and Warnings were discussed with patient: yes  Patient Labs were reviewed and or requested: yes  Patient Past Records were reviewed and or requested: yes        There are no Patient Instructions on file for this visit.     The patient verbalizes understanding and agrees with the plan of care        Patient has the advanced directives booklet to review

## 2019-05-13 RX ORDER — LANSOPRAZOLE 30 MG/1
CAPSULE, DELAYED RELEASE ORAL
Qty: 90 CAP | Refills: 1 | Status: SHIPPED | OUTPATIENT
Start: 2019-05-13 | End: 2019-11-08 | Stop reason: SDUPTHER

## 2019-07-17 DIAGNOSIS — I10 ESSENTIAL HYPERTENSION WITH GOAL BLOOD PRESSURE LESS THAN 130/80: ICD-10-CM

## 2019-07-17 RX ORDER — TRIAMTERENE/HYDROCHLOROTHIAZID 37.5-25 MG
TABLET ORAL
Qty: 90 TAB | Refills: 0 | Status: SHIPPED | OUTPATIENT
Start: 2019-07-17 | End: 2019-08-15 | Stop reason: SDUPTHER

## 2019-07-17 NOTE — TELEPHONE ENCOUNTER
Needs appt, please call patient to schedule her AWV in August. Her BP was not controlled at last visit, needs f/u and fasting labs.

## 2019-07-19 ENCOUNTER — OFFICE VISIT (OUTPATIENT)
Dept: FAMILY MEDICINE CLINIC | Age: 69
End: 2019-07-19

## 2019-07-19 VITALS
HEIGHT: 64 IN | RESPIRATION RATE: 19 BRPM | DIASTOLIC BLOOD PRESSURE: 79 MMHG | TEMPERATURE: 98.1 F | HEART RATE: 62 BPM | OXYGEN SATURATION: 96 % | BODY MASS INDEX: 38.93 KG/M2 | WEIGHT: 228 LBS | SYSTOLIC BLOOD PRESSURE: 123 MMHG

## 2019-07-19 DIAGNOSIS — J44.9 CHRONIC OBSTRUCTIVE PULMONARY DISEASE, UNSPECIFIED COPD TYPE (HCC): ICD-10-CM

## 2019-07-19 DIAGNOSIS — L30.9 DERMATITIS: ICD-10-CM

## 2019-07-19 DIAGNOSIS — E78.00 HYPERCHOLESTEROLEMIA: ICD-10-CM

## 2019-07-19 DIAGNOSIS — D50.0 IRON DEFICIENCY ANEMIA DUE TO CHRONIC BLOOD LOSS: ICD-10-CM

## 2019-07-19 DIAGNOSIS — K21.9 GASTROESOPHAGEAL REFLUX DISEASE WITHOUT ESOPHAGITIS: ICD-10-CM

## 2019-07-19 DIAGNOSIS — I10 ESSENTIAL HYPERTENSION: Primary | ICD-10-CM

## 2019-07-19 RX ORDER — PREDNISONE 10 MG/1
TABLET ORAL
Qty: 19 TAB | Refills: 0 | Status: SHIPPED | OUTPATIENT
Start: 2019-07-19 | End: 2019-11-01

## 2019-07-19 NOTE — PROGRESS NOTES
1. Have you been to the ER, urgent care clinic since your last visit? Hospitalized since your last visit? No     2. Have you seen or consulted any other health care providers outside of the [address] [place] [address] since your last visit? Include any pap smears or colon screening. No     Chief Complaint   Patient presents with    Rash     bilateral ears and face     Patient noticed rash on bilateral ears Tuesday and spread to face.

## 2019-07-28 NOTE — PROGRESS NOTES
Carrillo Gibson is a 81 y.o. female who presents with the following complaints:  Chief Complaint   Patient presents with    Rash     bilateral ears and face       Subjective:    HPI:   Presents for f/u of HTN, COPD, hypercholesterolemia, gerd, anemia, and evaluation of rash on ears and face. Cardiovascular risk analysis - LDL goal is under 100  hypertension  hyperlipidemia  obese  sedentary lifestyle. Compliance with treatment thus far has been good. ROS: taking medications as instructed, no medication side effects noted, no swelling of ankles. Diet and Lifestyle: generally follows a low fat low cholesterol diet, generally follows a low sodium diet, sedentary, nonsmoker  Home BP Monitoring: is not measured at home    Cardiovascular ROS: taking medications as instructed, no medication side effects noted, no TIA's, no chest pain on exertion, no dyspnea on exertion, no swelling of ankles, no orthostatic dizziness or lightheadedness, no orthopnea or paroxysmal nocturnal dyspnea, no palpitations, no intermittent claudication symptoms. Reports reflux well-controlled on lansoprazole; symptoms return if a dose is missed. Follows GERD diet. COPD stable, has regular f/u with pulmonary, reports good compliance with dulera, infrequent use of rescue. C/o 3 day hx of red raised rash with mild itching. Started on ears, has now spread over cheeks and nose. Has tried no medication or other treatment for the symptoms. Can recall no plant or chemical exposure. No new cleansers, lotions, or makeup. Pertinent PMH/FH/SH:  Past Medical History:   Diagnosis Date    Anemia     COPD (chronic obstructive pulmonary disease) (HCC)     GERD (gastroesophageal reflux disease)     Hypercholesterolemia     Hypertension     Obesity (BMI 30-39. 9)     Psychiatric disorder     depression/ not currently taking    Vertigo     Vestibulopathy 7/27/2016     Past Surgical History:   Procedure Laterality Date    COLONOSCOPY N/A 2018    COLONOSCOPY performed by Roula Brumfield MD at [address] [facility] HX COLONOSCOPY  10/25/2011    1 polyp which was removed, diverticulosis in distal descending colon & sigmoid colon.  HX ENDOSCOPY  2014    HX GYN  1996    hysterectomy    HX ORTHOPAEDIC Left     foot surgery    HX OTHER SURGICAL  2006? L Lower Abd. Basal Cell CA    HX TONSILLECTOMY       Family History   Problem Relation Age of Onset    Hypertension Mother     Stroke Maternal Grandmother     Cancer Paternal Grandmother         breast cancer    Breast Cancer Paternal Grandmother      Social History     Socioeconomic History    Marital status:      Spouse name: Not on file    Number of children: Not on file    Years of education: Not on file    Highest education level: Not on file   Tobacco Use    Smoking status: Former Smoker     Packs/day: 1.00     Years: 18.00     Pack years: 18.00     Types: Cigarettes     Last attempt to quit: 1988     Years since quittin.0    Smokeless tobacco: Never Used    Tobacco comment: Quit in    Substance and Sexual Activity    Alcohol use: Yes     Comment: rarely    Drug use: No    Sexual activity: Yes     Partners: Male     Birth control/protection: Surgical     Comment: Hysterectomy     Advanced Directives: N      Patient Active Problem List    Diagnosis    Obesity, morbid ([address]    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 9)    COPD (chronic obstructive pulmonary disease) ([address]    Hypertension    Hypercholesterolemia    Vertigo    GERD (gastroesophageal reflux disease)       Nurse notes were reviewed and are correct  Review of Systems - negative except as listed above in the HPI    Objective:     Vitals:    19 0949   BP: 123/79   Pulse: 62   Resp: 19   Temp: 98.1 °F (36.7 °C)   TempSrc: Oral   SpO2: 96%   Weight: 228 lb (103.4 kg)   Height: 5' 4\" (1.626 m)     Physical Examination: General appearance - alert, well appearing, and in no distress, oriented to person, place, and time and well hydrated  Mental status - normal mood, behavior, speech, dress, motor activity, and thought processes  Eyes - sclera anicteric  Neck - supple, no significant adenopathy, thyroid exam: thyroid is normal in size without nodules or tenderness  Chest - clear to auscultation, no wheezes, rales or rhonchi, symmetric air entry  Heart - normal rate, regular rhythm, normal S1, S2, no murmurs, rubs, clicks or gallops, normal bilateral carotid upstroke without bruits, no JVD  Abdomen - soft, nontender, nondistended, no masses or organomegaly  bowel sounds normal  Neurological - alert, oriented, normal speech, no focal findings or movement disorder noted  Extremities - no pedal edema noted, intact peripheral pulses  Skin - erythematous macularpapular rash scattered on ears, face    Assessment/ Plan:   Diagnoses and all orders for this visit:    1. Essential hypertension  At goal   Continue rx  DASH diet  Weight loss  Daily walking  -     METABOLIC PANEL, COMPREHENSIVE    2. Hypercholesterolemia  TLC Diet:  -- Saturated fat <7% of calories, cholesterol <200 mg/day  -- Consider increased viscous (soluble) fiber (10-25 g/day) and plant stanols/sterols  (2g/day) as therapeutic options to enhance LDL lowering  Weight management  Increased physical activity. Repeat lipids today  Continue atorvastatin at current dose pending results  -     METABOLIC PANEL, COMPREHENSIVE  -     LIPID PANEL    3. Iron deficiency anemia due to chronic blood loss  Secondary to AVM of colon  Stable  F/u with hematology as planned    4. Chronic obstructive pulmonary disease, unspecified COPD type ([facility]  Stable  Fu with pulmonary as planned    5. Gastroesophageal reflux disease without esophagitis  Stable  Continue rx  gerd diet    6. Dermatitis  Add rx  Moisturizer BID  -     predniSONE (DELTASONE) 10 mg tablet; Day 1-2 take 4 tabs, day 3-4 take 3 tabs, day 5-6 take 2 tabs, day 7 take 1 tab       Follow-up and Dispositions    · Return in about 6 months (around 1/19/2020), or if symptoms worsen or fail to improve. I have discussed the diagnosis with the patient and the intended plan as seen in the above orders. The patient has received an after-visit summary and questions were answered concerning future plans. The patient verbalizes understanding. Medication Side Effects and Warnings were discussed with patient: yes  Patient Labs were reviewed and or requested: yes  Patient Past Records were reviewed and or requested: yes    Patient Instructions          Dermatitis: Care Instructions  Your Care Instructions  Dermatitis is the general name used for any rash or inflammation of the skin. Different kinds of dermatitis cause different kinds of rashes. Common causes of a rash include new medicines, plants (such as poison oak or poison ivy), heat, and stress. Certain illnesses can also cause a rash. An allergic reaction to something that touches your skin, such as latex, nickel, or poison ivy, is called contact dermatitis. Contact dermatitis may also be caused by something that irritates the skin, such as bleach, a chemical, or soap. These types of rashes cannot be spread from person to person. How long your rash will last depends on what caused it. Rashes may last a few days or months. Follow-up care is a key part of your treatment and safety. Be sure to make and go to all appointments, and call your doctor if you are having problems. It's also a good idea to know your test results and keep a list of the medicines you take. How can you care for yourself at home? · Do not scratch the rash. Cut your nails short, and file them smooth. Or wear gloves if this helps keep you from scratching. · Wash the area with water only. Pat dry.   · Put cold, wet cloths on the rash to reduce itching. · Keep cool, and stay out of the sun. · Leave the rash open to the air as much as possible. · If the rash itches, use hydrocortisone cream. Follow the directions on the label. Calamine lotion may help for plant rashes. · Take an over-the-counter antihistamine, such as diphenhydramine (Benadryl) or loratadine (Claritin), to help calm the itching. Read and follow all instructions on the label. · If your doctor prescribed a cream, use it as directed. If your doctor prescribed medicine, take it exactly as directed. When should you call for help? Call your doctor now or seek immediate medical care if:    · You have symptoms of infection, such as:  ? Increased pain, swelling, warmth, or redness. ? Red streaks leading from the area. ? Pus draining from the area. ? A fever.     · You have joint pain along with the rash.    Watch closely for changes in your health, and be sure to contact your doctor if:    · Your rash is changing or getting worse.     · You are not getting better as expected. Where can you learn more? Go to http://negrita-zuleyka.info/. Enter (05) 7253 1275 in the search box to learn more about \"Dermatitis: Care Instructions. \"  Current as of: April 17, 2018  Content Version: 11.9  © 3718-1875 [employer]. Care instructions adapted under license by [employer] (which disclaims liability or warranty for this information). If you have questions about a medical condition or this instruction, always ask your healthcare professional. [place] [address] any warranty or liability for your use of this information. Starting a Weight Loss Plan: Care Instructions  Your Care Instructions    If you are thinking about losing weight, it can be hard to know where to start. Your doctor can help you set up a weight loss plan that best meets your needs.  You may want to take a class on nutrition or exercise, or join a weight loss support group. If you have questions about how to make changes to your eating or exercise habits, ask your doctor about seeing a registered dietitian or an exercise specialist.  It can be a big challenge to lose weight. But you do not have to make huge changes at once. Make small changes, and stick with them. When those changes become habit, add a few more changes. If you do not think you are ready to make changes right now, try to pick a date in the future. Make an appointment to see your doctor to discuss whether the time is right for you to start a plan. Follow-up care is a key part of your treatment and safety. Be sure to make and go to all appointments, and call your doctor if you are having problems. It's also a good idea to know your test results and keep a list of the medicines you take. How can you care for yourself at home? · Set realistic goals. Many people expect to lose much more weight than is likely. A weight loss of 5% to 10% of your body weight may be enough to improve your health. · Get family and friends involved to provide support. Talk to them about why you are trying to lose weight, and ask them to help. They can help by participating in exercise and having meals with you, even if they may be eating something different. · Find what works best for you. If you do not have time or do not like to cook, a program that offers meal replacement bars or shakes may be better for you. Or if you like to prepare meals, finding a plan that includes daily menus and recipes may be best.  · Ask your doctor about other health professionals who can help you achieve your weight loss goals. ? A dietitian can help you make healthy changes in your diet. ?  An exercise specialist or  can help you develop a safe and effective exercise program.  ? A counselor or psychiatrist can help you cope with issues such as depression, anxiety, or family problems that can make it hard to focus on weight loss. · Consider joining a support group for people who are trying to lose weight. Your doctor can suggest groups in your area. Where can you learn more? Go to http://negrita-zuleyka.info/. Enter B125 in the search box to learn more about \"Starting a Weight Loss Plan: Care Instructions. \"  Current as of: June 25, 2018  Content Version: 11.9  © 8589-2031 [employer]. Care instructions adapted under license by [employer] (which disclaims liability or warranty for this information). If you have questions about a medical condition or this instruction, always ask your healthcare professional. [place] [address] any warranty or liability for your use of this information.             Juana LISA

## 2019-08-01 RX ORDER — LEVOCETIRIZINE DIHYDROCHLORIDE 5 MG/1
TABLET, FILM COATED ORAL
Qty: 90 TAB | Refills: 4 | Status: SHIPPED | OUTPATIENT
Start: 2019-08-01 | End: 2020-01-27 | Stop reason: SDUPTHER

## 2019-08-05 DIAGNOSIS — E78.00 HYPERCHOLESTEROLEMIA: ICD-10-CM

## 2019-08-05 NOTE — TELEPHONE ENCOUNTER
----- Message from Tyesha Moon sent at 8/1/2019 10:55 AM EDT -----  Regarding: FW: GEOVANY Dueñas / Rickey Tinoco       ----- Message -----  From: Jase Loja  Sent: 8/1/2019  10:24 AM EDT  To: [facility] Front Office  Subject: GEOVANY Dueñas / Dragan Santa Message/Vendor Calls    Pt is requesting to speak with nurse in regard to obtaining numerous refills on medications.      Callback required       Best contact number(s):  [phone]          Syble Harada

## 2019-08-06 RX ORDER — ATORVASTATIN CALCIUM 10 MG/1
TABLET, FILM COATED ORAL
Qty: 90 TAB | Refills: 1 | Status: SHIPPED | OUTPATIENT
Start: 2019-08-06 | End: 2019-08-15 | Stop reason: ALTCHOICE

## 2019-08-08 LAB
ALBUMIN SERPL-MCNC: 4.4 G/DL (ref 3.6–4.8)
ALBUMIN/GLOB SERPL: 1.8 {RATIO} (ref 1.2–2.2)
ALP SERPL-CCNC: 70 IU/L (ref 39–117)
ALT SERPL-CCNC: 22 IU/L (ref 0–32)
AST SERPL-CCNC: 21 IU/L (ref 0–40)
BILIRUB SERPL-MCNC: 0.5 MG/DL (ref 0–1.2)
BUN SERPL-MCNC: 8 MG/DL (ref 8–27)
BUN/CREAT SERPL: 10 (ref 12–28)
CALCIUM SERPL-MCNC: 9 MG/DL (ref 8.7–10.3)
CHLORIDE SERPL-SCNC: 104 MMOL/L (ref 96–106)
CHOLEST SERPL-MCNC: 125 MG/DL (ref 100–199)
CO2 SERPL-SCNC: 25 MMOL/L (ref 20–29)
CREAT SERPL-MCNC: 0.82 MG/DL (ref 0.57–1)
GLOBULIN SER CALC-MCNC: 2.4 G/DL (ref 1.5–4.5)
GLUCOSE SERPL-MCNC: 98 MG/DL (ref 65–99)
HDLC SERPL-MCNC: 27 MG/DL
INTERPRETATION, 910389: NORMAL
LDLC SERPL CALC-MCNC: 56 MG/DL (ref 0–99)
POTASSIUM SERPL-SCNC: 3.7 MMOL/L (ref 3.5–5.2)
PROT SERPL-MCNC: 6.8 G/DL (ref 6–8.5)
SODIUM SERPL-SCNC: 145 MMOL/L (ref 134–144)
TRIGL SERPL-MCNC: 211 MG/DL (ref 0–149)
VLDLC SERPL CALC-MCNC: 42 MG/DL (ref 5–40)

## 2019-08-15 RX ORDER — ROSUVASTATIN CALCIUM 10 MG/1
10 TABLET, COATED ORAL
Qty: 90 TAB | Refills: 0 | Status: SHIPPED | OUTPATIENT
Start: 2019-08-15 | End: 2019-08-16 | Stop reason: SDUPTHER

## 2019-08-15 NOTE — PROGRESS NOTES
Triglycerides remain high with low HDL. Recommend switching atorvastatin to rosuvastatin to see if we can improve these.  New rx sent to pharmacy, return to clinic for f/u appt and fasting labs in 6-8 weeks

## 2019-08-15 NOTE — TELEPHONE ENCOUNTER
[facility] didn't receive the prescription sent on 7/17/2019. Patient requesting we send it again.

## 2019-08-16 DIAGNOSIS — E78.00 HYPERCHOLESTEROLEMIA: ICD-10-CM

## 2019-08-16 RX ORDER — ROSUVASTATIN CALCIUM 10 MG/1
10 TABLET, COATED ORAL
Qty: 90 TAB | Refills: 0 | Status: SHIPPED | OUTPATIENT
Start: 2019-08-16 | End: 2020-01-27 | Stop reason: SDUPTHER

## 2019-08-16 RX ORDER — TRIAMTERENE/HYDROCHLOROTHIAZID 37.5-25 MG
1 TABLET ORAL DAILY
Qty: 90 TAB | Refills: 1 | Status: SHIPPED | OUTPATIENT
Start: 2019-08-16 | End: 2020-01-27 | Stop reason: SDUPTHER

## 2019-08-16 NOTE — PROGRESS NOTES
Patient informed of providers lab result note and requested medication be sent to [facility] in [place] since she's on vacation there. New prescription sent as prescribed.

## 2019-10-07 DIAGNOSIS — I10 ESSENTIAL HYPERTENSION WITH GOAL BLOOD PRESSURE LESS THAN 130/80: ICD-10-CM

## 2019-10-07 RX ORDER — LOSARTAN POTASSIUM 25 MG/1
TABLET ORAL
Qty: 90 TAB | Refills: 4 | Status: SHIPPED | OUTPATIENT
Start: 2019-10-07 | End: 2020-01-27 | Stop reason: SDUPTHER

## 2019-10-09 ENCOUNTER — TELEPHONE (OUTPATIENT)
Dept: FAMILY MEDICINE CLINIC | Age: 69
End: 2019-10-09

## 2019-10-09 NOTE — TELEPHONE ENCOUNTER
Returned patient's call. Patient x2 id verified. Advised patient to check other pharmacy if they have available. Patient stated that \"okay\".

## 2019-10-09 NOTE — TELEPHONE ENCOUNTER
Patient called asking about the high dosage flu shot and we are out and her pharmacy is out and won't have it for 1 month and patient is asking what should she do?     Please advise thanks

## 2019-10-21 ENCOUNTER — TELEPHONE (OUTPATIENT)
Dept: ONCOLOGY | Age: 69
End: 2019-10-21

## 2019-10-21 NOTE — TELEPHONE ENCOUNTER
[address] at [facility]  [phone]    10/21/19- Phone call placed to pt to remind pt to have labs drawn prior to her follow up appointment with . Pt verbalized understanding.

## 2019-10-25 ENCOUNTER — HOSPITAL ENCOUNTER (OUTPATIENT)
Dept: LAB | Age: 69
Discharge: HOME | End: 2019-10-25
Payer: MEDICARE

## 2019-10-25 PROCEDURE — 36415 COLL VENOUS BLD VENIPUNCTURE: CPT

## 2019-10-25 PROCEDURE — 83550 IRON BINDING TEST: CPT

## 2019-10-25 PROCEDURE — 85027 COMPLETE CBC AUTOMATED: CPT

## 2019-10-25 PROCEDURE — 82728 ASSAY OF FERRITIN: CPT

## 2019-10-26 LAB
ERYTHROCYTE [DISTWIDTH] IN BLOOD BY AUTOMATED COUNT: 14.8 % (ref 12.3–15.4)
FERRITIN SERPL-MCNC: 12 NG/ML (ref 15–150)
HCT VFR BLD AUTO: 40.2 % (ref 34–46.6)
HGB BLD-MCNC: 13 G/DL (ref 11.1–15.9)
IRON SATN MFR SERPL: 16 % (ref 15–55)
IRON SERPL-MCNC: 60 UG/DL (ref 27–139)
MCH RBC QN AUTO: 25.1 PG (ref 26.6–33)
MCHC RBC AUTO-ENTMCNC: 32.3 G/DL (ref 31.5–35.7)
MCV RBC AUTO: 78 FL (ref 79–97)
PLATELET # BLD AUTO: 237 X10E3/UL (ref 150–450)
RBC # BLD AUTO: 5.17 X10E6/UL (ref 3.77–5.28)
TIBC SERPL-MCNC: 386 UG/DL (ref 250–450)
UIBC SERPL-MCNC: 326 UG/DL (ref 118–369)
WBC # BLD AUTO: 9.5 X10E3/UL (ref 3.4–10.8)

## 2019-10-28 NOTE — PROGRESS NOTES
[facility] [place] at [address], [address] [facility] [address] [place]: [phone]  F: [phone]      Reason for Visit:   Alek Mix is a 81 y.o. female who is seen for follow up of iron deficiency anemia. History of Present Illness:   She continues to focus on iron-rich diet. Can't tolerate oral iron. Denies any bleeding. No melena. Energy has been \"pretty good. \"  No dyspnea. Occasional dizziness. PAST HISTORY: The following sections were reviewed and updated in the EMR as appropriate: PMH, SH, FH, Medications, Allergies. Allergies   Allergen Reactions    Loratadine Nausea Only and Other (comments)     Muscle weakness      Review of Systems: A complete review of systems was obtained, reviewed, and scanned into the EMR. Pertinent findings reviewed above. Physical Exam:     Visit Vitals  /58 (BP 1 Location: Right arm, BP Patient Position: Sitting)   Pulse 78   Temp 97.8 °F (36.6 °C) (Temporal)   Resp 18   Ht 5' 4\" (1.626 m)   Wt 229 lb (103.9 kg)   LMP 01/01/1996   SpO2 98%   BMI 39.31 kg/m²     General: No distress  Eyes: PERRLA, anicteric sclerae  HENT: Atraumatic, OP clear  Neck: Supple  Lymphatic: No cervical, supraclavicular, or inguinal adenopathy  Respiratory: CTAB, normal respiratory effort  CV: Normal rate, regular rhythm, no murmurs, no peripheral edema  GI: Soft, nontender, nondistended, no masses, no hepatomegaly, no splenomegaly  MS: Normal gait and station. Digits without clubbing or cyanosis. Skin: No rashes, ecchymoses, or petechiae. Normal temperature, turgor, and texture. Psych: Alert, oriented, appropriate affect, normal judgment/insight    Results:     Lab Results   Component Value Date/Time    WBC 9.5 10/25/2019 11:41 AM    HGB 13.0 10/25/2019 11:41 AM    HCT 40.2 10/25/2019 11:41 AM    PLATELET 521 74/83/8877 11:41 AM    MCV 78 (L) 10/25/2019 11:41 AM    ABS.  NEUTROPHILS 6.3 10/26/2018 02:56 PM    Hemoglobin (POC) 8.8 02/20/2014 11:34 AM    Hemoglobin (POC) 10.2 (L) 10/15/2011 03:12 PM    Hematocrit (POC) 30 (L) 10/15/2011 03:12 PM     Lab Results   Component Value Date/Time    Sodium 145 (H) 08/07/2019 11:03 AM    Potassium 3.7 08/07/2019 11:03 AM    Chloride 104 08/07/2019 11:03 AM    CO2 25 08/07/2019 11:03 AM    Glucose 98 08/07/2019 11:03 AM    BUN 8 08/07/2019 11:03 AM    Creatinine 0.82 08/07/2019 11:03 AM    GFR est AA 86 08/07/2019 11:03 AM    GFR est non-AA 74 08/07/2019 11:03 AM    Calcium 9.0 08/07/2019 11:03 AM    Sodium (POC) 141 10/15/2011 03:12 PM    Potassium (POC) 3.5 10/15/2011 03:12 PM    Chloride (POC) 104 10/15/2011 03:12 PM    Glucose (POC) 83 07/26/2016 12:03 PM    Glucose POC 90 12/05/2016 11:08 AM    BUN (POC) 10 10/15/2011 03:12 PM    Creatinine (POC) 1.1 10/15/2011 03:12 PM    Calcium, ionized (POC) 1.11 (L) 10/15/2011 03:12 PM     Lab Results   Component Value Date/Time    Bilirubin, total 0.5 08/07/2019 11:03 AM    AST (SGOT) 21 08/07/2019 11:03 AM    Alk.  phosphatase 70 08/07/2019 11:03 AM    Protein, total 6.8 08/07/2019 11:03 AM    Albumin 4.4 08/07/2019 11:03 AM    Globulin 3.7 07/26/2016 12:36 PM    A-G Ratio 1.8 08/07/2019 11:03 AM       Lab Results   Component Value Date/Time    Reticulocyte count 1.9 02/20/2014 10:50 AM    Iron % saturation 16 10/25/2019 11:41 AM    TIBC 386 10/25/2019 11:41 AM    Ferritin 12 (L) 10/25/2019 11:41 AM    Vitamin B12 380 08/21/2012 09:45 AM    Folate 15.5 08/21/2012 09:45 AM    Haptoglobin 101 02/20/2014 10:50 AM     (H) 12/08/2011 02:38 PM    Sed rate (ESR) 5 05/05/2014 10:40 AM    TSH 3.090 09/25/2018 09:50 AM    INR 1.0 10/15/2011 03:07 PM    INR (POC) 1.0 07/26/2016 12:07 PM    Lipase 70 (L) 05/10/2012 12:30 PM     HGB (g/dL)   Date Value   10/25/2019 13.0   10/26/2018 15.1   09/25/2018 16.2 (H)   04/25/2018 15.6   01/16/2018 11.1   07/17/2017 12.3   04/17/2017 13.8   01/05/2017 15.1   07/27/2016 13.0   07/26/2016 13.9   07/15/2016 13.7 05/03/2016 9.0 (L)   12/07/2015 12.3   06/03/2015 14.4   03/17/2015 14.9   12/08/2014 16.4 (H)   06/11/2014 15.5   05/05/2014 14.0   04/11/2014 13.4   03/05/2014 9.2 (L)   02/18/2014 8.5 (L)   04/09/2013 12.8   07/16/2012 11.6   05/11/2012 11.9   05/10/2012 13.2   01/19/2012 14.0   12/08/2011 13.6   10/15/2011 8.7 (L)   09/29/2011 8.5 (L)       Ferritin   Date Value   10/25/2019 12 ng/mL (L)   10/26/2018 29 ng/mL   04/25/2018 72 ng/mL   01/16/2018 8 ng/mL (L)   07/17/2017 12 ng/mL (L)   01/05/2017 24 ng/mL   07/15/2016 18 ng/mL   05/03/2016 4 ng/mL (L)   12/07/2015 10 ng/mL (L)   06/03/2015 17 ng/mL   12/08/2014 79 ng/mL   06/11/2014 40 NG/ML   04/11/2014 19 NG/ML   02/20/2014 5 ng/mL (L)   10/07/2011 5 ng/mL (L)       Stool Blood 2/24/2014: negative  Celiac panel 12/8/2014: negative    EGD and Colonscopy with Dr. Pham Espinal 4/9/2014: severe diverticulosis, nonbleeding  Capsule study 4/30/2014: normal    EGD and Colonoscopy with Dr. Musa Jaime 6/16/2016: large cecum AVM, one polyp (tubular adenoma), otherwise normal.  Capsule endoscopy 6/30/2016: normal    EGD and Colonoscopy with Dr. Musa Jaime 5/22/2018: Normal esophagus, stomach, duodenum, jejunum. AVM in colon treated. Assessment:   1) Anemia, Iron Deficiency  Recurrent. S/p feraheme in 5/2016 and 7/2016, and injectafer 2/2018. Anemia resolved, though ferritin is slowly falling, now low once again, and microcytosis has returned. She cannot tolerate oral iron. Continue to focus on iron rich diet, and we will consider additional IV iron if/when she becomes anemic again. The source of her iron deficiency is likely recurrent AVMs. Celiac panel was negative, and no reason to suggest another malabsorption issue. No evidence of bleeding from elsewhere. She is at risk for recurrence in the future, so we will monitor. 2) AVMs in colon  S/p treatment with GI in 6/2016 and 5/2018. She is at risk for recurrence. Monitor. 3) Fatigue  Resolved now. Monitor.     4) COPD  Follows with pulmonary    Plan:     Iron rich diet  Labs in 12 months: CBC, iron profile, ferritin ([facility])  Return to see me in 12 months, or sooner if symptoms returning      Signed By: Rukhsana Boston MD

## 2019-11-01 ENCOUNTER — OFFICE VISIT (OUTPATIENT)
Dept: ONCOLOGY | Age: 69
End: 2019-11-01

## 2019-11-01 VITALS
RESPIRATION RATE: 18 BRPM | BODY MASS INDEX: 39.09 KG/M2 | OXYGEN SATURATION: 98 % | HEART RATE: 78 BPM | SYSTOLIC BLOOD PRESSURE: 116 MMHG | HEIGHT: 64 IN | DIASTOLIC BLOOD PRESSURE: 58 MMHG | TEMPERATURE: 97.8 F | WEIGHT: 229 LBS

## 2019-11-01 DIAGNOSIS — D50.0 IRON DEFICIENCY ANEMIA DUE TO CHRONIC BLOOD LOSS: Primary | ICD-10-CM

## 2019-11-01 NOTE — PROGRESS NOTES
Damaris Sanchez is a 81 y.o. female follow up for anemia. 1. Have you been to the ER, urgent care clinic since your last visit? Hospitalized since your last visit?no     2. Have you seen or consulted any other health care providers outside of the [address] [place] [address] since your last visit? Include any pap smears or colon screening.  no

## 2019-11-11 RX ORDER — LANSOPRAZOLE 30 MG/1
CAPSULE, DELAYED RELEASE ORAL
Qty: 90 CAP | Refills: 4 | Status: SHIPPED | OUTPATIENT
Start: 2019-11-11 | End: 2020-02-06 | Stop reason: SDUPTHER

## 2019-11-21 RX ORDER — POTASSIUM CHLORIDE 20 MEQ/1
TABLET, EXTENDED RELEASE ORAL
Qty: 270 TAB | Refills: 4 | Status: SHIPPED | OUTPATIENT
Start: 2019-11-21 | End: 2020-01-27 | Stop reason: SDUPTHER

## 2020-01-27 ENCOUNTER — OFFICE VISIT (OUTPATIENT)
Dept: FAMILY MEDICINE CLINIC | Age: 70
End: 2020-01-27

## 2020-01-27 VITALS
WEIGHT: 229.2 LBS | HEART RATE: 65 BPM | RESPIRATION RATE: 16 BRPM | DIASTOLIC BLOOD PRESSURE: 73 MMHG | BODY MASS INDEX: 39.13 KG/M2 | OXYGEN SATURATION: 91 % | HEIGHT: 64 IN | SYSTOLIC BLOOD PRESSURE: 127 MMHG | TEMPERATURE: 98.5 F

## 2020-01-27 DIAGNOSIS — Z13.31 SCREENING FOR DEPRESSION: ICD-10-CM

## 2020-01-27 DIAGNOSIS — J30.89 NON-SEASONAL ALLERGIC RHINITIS, UNSPECIFIED TRIGGER: ICD-10-CM

## 2020-01-27 DIAGNOSIS — Z00.00 MEDICARE ANNUAL WELLNESS VISIT, SUBSEQUENT: Primary | ICD-10-CM

## 2020-01-27 DIAGNOSIS — F41.1 GAD (GENERALIZED ANXIETY DISORDER): ICD-10-CM

## 2020-01-27 DIAGNOSIS — Z13.39 SCREENING FOR ALCOHOLISM: ICD-10-CM

## 2020-01-27 DIAGNOSIS — I10 ESSENTIAL HYPERTENSION WITH GOAL BLOOD PRESSURE LESS THAN 130/80: ICD-10-CM

## 2020-01-27 DIAGNOSIS — E78.00 HYPERCHOLESTEROLEMIA: ICD-10-CM

## 2020-01-27 DIAGNOSIS — Z78.0 POSTMENOPAUSAL STATE: ICD-10-CM

## 2020-01-27 DIAGNOSIS — Z12.31 ENCOUNTER FOR SCREENING MAMMOGRAM FOR MALIGNANT NEOPLASM OF BREAST: ICD-10-CM

## 2020-01-27 DIAGNOSIS — J45.41 MODERATE PERSISTENT ASTHMA WITH ACUTE EXACERBATION: ICD-10-CM

## 2020-01-27 DIAGNOSIS — Z71.89 ADVANCED DIRECTIVES, COUNSELING/DISCUSSION: ICD-10-CM

## 2020-01-27 DIAGNOSIS — Z23 ENCOUNTER FOR IMMUNIZATION: ICD-10-CM

## 2020-01-27 DIAGNOSIS — K21.9 GASTROESOPHAGEAL REFLUX DISEASE WITHOUT ESOPHAGITIS: ICD-10-CM

## 2020-01-27 DIAGNOSIS — Z11.59 NEED FOR HEPATITIS C SCREENING TEST: ICD-10-CM

## 2020-01-27 PROBLEM — J45.40 MODERATE PERSISTENT ASTHMA: Status: ACTIVE | Noted: 2020-01-27

## 2020-01-27 RX ORDER — LEVOCETIRIZINE DIHYDROCHLORIDE 5 MG/1
TABLET, FILM COATED ORAL
Qty: 90 TAB | Refills: 3 | Status: SHIPPED | OUTPATIENT
Start: 2020-01-27 | End: 2021-02-06

## 2020-01-27 RX ORDER — LOSARTAN POTASSIUM 25 MG/1
TABLET ORAL
Qty: 90 TAB | Refills: 3 | Status: SHIPPED | OUTPATIENT
Start: 2020-01-27 | End: 2021-02-06

## 2020-01-27 RX ORDER — MONTELUKAST SODIUM 10 MG/1
TABLET ORAL
Qty: 90 TAB | Refills: 3 | Status: SHIPPED | OUTPATIENT
Start: 2020-01-27 | End: 2021-02-06

## 2020-01-27 RX ORDER — PAROXETINE HYDROCHLORIDE 20 MG/1
TABLET, FILM COATED ORAL
Qty: 90 TAB | Refills: 3 | Status: SHIPPED | OUTPATIENT
Start: 2020-01-27 | End: 2020-02-06 | Stop reason: SDUPTHER

## 2020-01-27 RX ORDER — ROSUVASTATIN CALCIUM 10 MG/1
10 TABLET, COATED ORAL
Qty: 90 TAB | Refills: 3 | Status: SHIPPED | OUTPATIENT
Start: 2020-01-27 | End: 2020-02-06 | Stop reason: SDUPTHER

## 2020-01-27 RX ORDER — POTASSIUM CHLORIDE 20 MEQ/1
TABLET, EXTENDED RELEASE ORAL
Qty: 270 TAB | Refills: 3 | Status: SHIPPED | OUTPATIENT
Start: 2020-01-27 | End: 2021-03-30

## 2020-01-27 RX ORDER — TRIAMTERENE/HYDROCHLOROTHIAZID 37.5-25 MG
1 TABLET ORAL DAILY
Qty: 90 TAB | Refills: 3 | Status: SHIPPED | OUTPATIENT
Start: 2020-01-27 | End: 2021-02-06

## 2020-01-27 NOTE — PROGRESS NOTES
Subjective:     Bharathi Sanchez is a 81 y.o. female who presents for follow up of hypertension, hyperlipidemia, asthma, anemia, and GERD. Diet and Lifestyle: generally follows a low fat low cholesterol diet, generally follows a low sodium diet, sedentary, nonsmoker  Home BP Monitoring: is not measured at home    Cardiovascular risk analysis - LDL goal is under 100  hypertension  hyperlipidemia  obese  sedentary lifestyle. Compliance with treatment thus far has been good. ROS: taking medications as instructed, no medication side effects noted. Cardiovascular ROS: taking medications as instructed, no medication side effects noted, no TIA's, no chest pain on exertion, no dyspnea on exertion, no swelling of ankles, no orthostatic dizziness or lightheadedness, no orthopnea or paroxysmal nocturnal dyspnea, no palpitations, no intermittent claudication symptoms. New concerns: reports multiple exacerbations of asthma requiring oral prednisone in the past few months, treated by pulmonologist (Dr. James Morrell) . Review of records reveals w/u in progress (spirometry, eosinophils) to see if she may benefit from injectible such as nucala. Had f/u with hematology (Dr. Rody Carpenter) in November 2019; review of records reveals dropping ferritin levels with return of microcytosis, felt to represent likely recurrence of gastrointestinal AVM with impending anemia. She has not been able to tolerate oral iron in the past. Hematologist notes iron injection will likely be needed when anemia returns. Reports anxiety remains well-controlled on paroxetine, good compliance, tolerating well without apparent side effects. Reports GERD well-controlled with diet and lansoprazole. Reports good compliance with medication, tolerating well without apparent side effects. Symptoms return quickly with missed or late doses.     Patient Active Problem List   Diagnosis Code    GERD (gastroesophageal reflux disease) K21.9    Obesity (BMI 30-39. 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 9)     Psychiatric disorder     depression/ not currently taking    Vertigo     Vestibulopathy 2016     Past Surgical History:   Procedure Laterality Date    COLONOSCOPY N/A 2018    [facility] Ele [facility] HX COLONOSCOPY  10/25/2011    1 polyp which was removed, diverticulosis in distal descending colon & sigmoid colon.  HX ENDOSCOPY  2014    HX GYN  1996    hysterectomy    HX ORTHOPAEDIC Left     foot surgery    HX OTHER SURGICAL  2006? L Lower Abd.  Basal Cell CA    HX TONSILLECTOMY       Family History   Problem Relation Age of Onset    Hypertension Mother     Stroke Maternal Grandmother     Cancer Paternal Grandmother         breast cancer    Breast Cancer Paternal Grandmother      Social History     Tobacco Use    Smoking status: Former Smoker     Packs/day: 1.00     Years: 18.00     Pack years: 18.00     Types: Cigarettes     Last attempt to quit: 1988     Years since quittin.5    Smokeless tobacco: Never Used    Tobacco comment: Quit in    Substance Use Topics    Alcohol use: Yes     Comment: rarely        Lab Results   Component Value Date/Time    WBC 9.5 10/25/2019 11:41 AM    HGB 13.0 10/25/2019 11:41 AM    Hemoglobin (POC) 8.8 02/20/2014 11:34 AM    Hemoglobin (POC) 10.2 (L) 10/15/2011 03:12 PM    HCT 40.2 10/25/2019 11:41 AM    Hematocrit (POC) 30 (L) 10/15/2011 03:12 PM    PLATELET 490 06/79/5162 11:41 AM    MCV 78 (L) 10/25/2019 11:41 AM     Lab Results   Component Value Date/Time    Hemoglobin A1c 5.1 09/25/2018 09:50 AM    Glucose 98 08/07/2019 11:03 AM    Glucose (POC) 83 07/26/2016 12:03 PM    Glucose POC 90 12/05/2016 11:08 AM    Microalb/Creat ratio (ug/mg creat.) 3.7 07/16/2012 10:51 AM    LDL, calculated 56 08/07/2019 11:03 AM    Creatinine (POC) 1.1 10/15/2011 03:12 PM    Creatinine 0.82 08/07/2019 11:03 AM      Lab Results   Component Value Date/Time    Cholesterol, total 125 08/07/2019 11:03 AM    HDL Cholesterol 27 (L) 08/07/2019 11:03 AM    LDL, calculated 56 08/07/2019 11:03 AM    Triglyceride 211 (H) 08/07/2019 11:03 AM    CHOL/HDL Ratio 5.6 (H) 07/26/2016 12:36 PM     Lab Results   Component Value Date/Time    ALT (SGPT) 22 08/07/2019 11:03 AM    AST (SGOT) 21 08/07/2019 11:03 AM    Alk.  phosphatase 70 08/07/2019 11:03 AM    Bilirubin, direct 0.1 10/15/2011 03:07 PM    Bilirubin, total 0.5 08/07/2019 11:03 AM    Albumin 4.4 08/07/2019 11:03 AM    Protein, total 6.8 08/07/2019 11:03 AM    INR 1.0 10/15/2011 03:07 PM    INR (POC) 1.0 07/26/2016 12:07 PM    Prothrombin time 10.0 10/15/2011 03:07 PM    PLATELET 262 51/44/5893 11:41 AM     Lab Results   Component Value Date/Time    GFR est non-AA 74 08/07/2019 11:03 AM    GFRNA, POC 54 (L) 10/15/2011 03:12 PM    GFR est AA 86 08/07/2019 11:03 AM    GFRAA, POC >60 10/15/2011 03:12 PM    Creatinine 0.82 08/07/2019 11:03 AM    Creatinine (POC) 1.1 10/15/2011 03:12 PM    BUN 8 08/07/2019 11:03 AM    BUN (POC) 10 10/15/2011 03:12 PM    Sodium 145 (H) 08/07/2019 11:03 AM    Sodium (POC) 141 10/15/2011 03:12 PM    Potassium 3.7 08/07/2019 11:03 AM    Potassium (POC) 3.5 10/15/2011 03:12 PM    Chloride 104 08/07/2019 11:03 AM    Chloride (POC) 104 10/15/2011 03:12 PM    CO2 25 08/07/2019 11:03 AM    Magnesium 1.9 07/27/2016 01:03 AM     Lab Results   Component Value Date/Time    TSH 3.090 09/25/2018 09:50 AM    T4, Free 0.98 04/09/2013 04:10 PM         Review of Systems, additional:  A comprehensive review of systems was negative except for that written in the HPI. Objective:     Visit Vitals  /73 (BP 1 Location: Right arm, BP Patient Position: Sitting)   Pulse 65   Temp 98.5 °F (36.9 °C) (Oral)   Resp 16   Ht 5' 4\" (1.626 m)   Wt 229 lb 3.2 oz (104 kg)   LMP 01/01/1996   SpO2 91%   BMI 39.34 kg/m²     Physical Examination: General appearance - alert, well appearing, and in no distress, oriented to person, place, and time and well hydrated  Mental status - normal mood, behavior, speech, dress, motor activity, and thought processes  Eyes - sclera anicteric  Neck - supple, no significant adenopathy, thyroid exam: thyroid is normal in size without nodules or tenderness  Chest - clear to auscultation, no wheezes, rales or rhonchi, symmetric air entry  Heart - normal rate, regular rhythm, normal S1, S2, no murmurs, rubs, clicks or gallops, normal bilateral carotid upstroke without bruits, no JVD  Abdomen - soft, nontender, nondistended, no masses or organomegaly  bowel sounds normal  Neurological - alert, oriented, normal speech, no focal findings or movement disorder noted  Musculoskeletal - no joint tenderness, deformity or swelling, no muscular tenderness noted  Extremities - no pedal edema noted, intact peripheral pulses  Skin - normal coloration and turgor, no rashes, no suspicious skin lesions noted      Assessment/Plan:     reviewed diet, exercise and weight control  copy of written low fat low cholesterol diet provided and reviewed  cardiovascular risk and specific lipid/LDL goals reviewed  reviewed medications and side effects in detail  reviewed potential future medication changes and side effects.    Diagnoses and all orders for this visit: 1. Essential hypertension with goal blood pressure less than 130/80  at goal  Continue maxide, losartan  DASH diet  Weight loss  150 minutes of moderate intensity exercise weekly  -     triamterene-hydroCHLOROthiazide (MAXZIDE) 37.5-25 mg per tablet; Take 1 Tab by mouth daily. -     losartan (COZAAR) 25 mg tablet; Take one tablet daily  -     METABOLIC PANEL, COMPREHENSIVE; Future  -     TSH 3RD GENERATION; Future    2. Hypercholesterolemia  At goal at previous check  TLC Diet:  -- Saturated fat <7% of calories, cholesterol <200 mg/day  -- Consider increased viscous (soluble) fiber (10-25 g/day) and plant stanols/sterols  (2g/day) as therapeutic options to enhance LDL lowering  Weight management  Increased physical activity. Continue rx  Fasting lipids- will return later this week for draw  -     rosuvastatin (CRESTOR) 10 mg tablet; Take 1 Tab by mouth nightly. -     LIPID PANEL; Future    3. ANISA (generalized anxiety disorder)  Stable  Continue rx  -     PARoxetine (PAXIL) 20 mg tablet; TAKE 1 TABLET BY MOUTH DAILY    4. Non-seasonal allergic rhinitis, unspecified trigger  Continue rx  Continue antihistamine  -     montelukast (SINGULAIR) 10 mg tablet; TAKE 1 TABLET DAILY    5. Moderate persistent asthma with acute exacerbation  Multiple recent exacerbations  Continue symbicort   Work up in progress for possible additional therapies, f/u with pulmonary as planned    6. GERD  Stable  GERD diet  Continue PPI    Other orders  -     potassium chloride (K-DUR, KLOR-CON) 20 mEq tablet; Take one tablet daily  -     levocetirizine (XYZAL) 5 mg tablet; Take one tablet daily      Follow-up and Dispositions    · Return in about 6 months (around 7/27/2020), or if symptoms worsen or fail to improve. I have discussed the diagnosis with the patient and the intended plan as seen in the above orders. The patient has received an after-visit summary and questions were answered concerning future plans.  Patient conveyed understanding of the plan at the time of the visit.     Arya Vera NP

## 2020-01-27 NOTE — PROGRESS NOTES
Isaias Baker is a 81 y.o. female    Chief Complaint   Patient presents with    Medication Refill    Annual Wellness Visit       1. Have you been to the ER, urgent care clinic since your last visit? Hospitalized since your last visit? No    2. Have you seen or consulted any other health care providers outside of the [address] [place] [address] since your last visit? Include any pap smears or colon screening.  No

## 2020-01-27 NOTE — PATIENT INSTRUCTIONS
Medicare Wellness Visit, Female     The best way to live healthy is to have a lifestyle where you eat a well-balanced diet, exercise regularly, limit alcohol use, and quit all forms of tobacco/nicotine, if applicable. Regular preventive services are another way to keep healthy. Preventive services (vaccines, screening tests, monitoring & exams) can help personalize your care plan, which helps you manage your own care. Screening tests can find health problems at the earliest stages, when they are easiest to treat. [facility] follows the current, evidence-based guidelines published by the [facility] ([facility]) when recommending preventive services for our patients. Because we follow these guidelines, sometimes recommendations change over time as research supports it. (For example, mammograms used to be recommended annually. Even though Medicare will still pay for an annual mammogram, the newer guidelines recommend a mammogram every two years for women of average risk). Of course, you and your doctor may decide to screen more often for some diseases, based on your risk and your co-morbidities (chronic disease you are already diagnosed with). Preventive services for you include:  - Medicare offers their members a free annual wellness visit, which is time for you and your primary care provider to discuss and plan for your preventive service needs. Take advantage of this benefit every year!  -All adults over the age of 72 should receive the recommended pneumonia vaccines. Current USPSTF guidelines recommend a series of two vaccines for the best pneumonia protection.   -All adults should have a flu vaccine yearly and a tetanus vaccine every 10 years.   -All adults age 48 and older should receive the shingles vaccines (series of two vaccines).       -All adults age 38-68 who are overweight should have a diabetes screening test once every three years.   -All adults born between [birth year] and 1965 should be screened once for Hepatitis C.  -Other screening tests and preventive services for persons with diabetes include: an eye exam to screen for diabetic retinopathy, a kidney function test, a foot exam, and stricter control over your cholesterol.   -Cardiovascular screening for adults with routine risk involves an electrocardiogram (ECG) at intervals determined by your doctor.   -Colorectal cancer screenings should be done for adults age 54-65 with no increased risk factors for colorectal cancer. There are a number of acceptable methods of screening for this type of cancer. Each test has its own benefits and drawbacks. Discuss with your doctor what is most appropriate for you during your annual wellness visit. The different tests include: colonoscopy (considered the best screening method), a fecal occult blood test, a fecal DNA test, and sigmoidoscopy.    -A bone mass density test is recommended when a woman turns 65 to screen for osteoporosis. This test is only recommended one time, as a screening. Some providers will use this same test as a disease monitoring tool if you already have osteoporosis. -Breast cancer screenings are recommended every other year for women of normal risk, age 54-69.  -Cervical cancer screenings for women over age 72 are only recommended with certain risk factors.      Here is a list of your current Health Maintenance items (your personalized list of preventive services) with a due date:  Health Maintenance Due   Topic Date Due    Hepatitis C Test  1950    Shingles Vaccine (1 of 2) 04/21/2002    Glaucoma Screening   04/21/2017    Bone Mineral Density   04/21/2017    Pneumococcal Vaccine (1 of 1 - PPSV23) 04/21/2017    Flu Vaccine  08/01/2019    Annual Well Visit  09/26/2019

## 2020-01-27 NOTE — ACP (ADVANCE CARE PLANNING)
Advance Care Planning    Advance Care Planning (ACP) Provider Conversation Snapshot    Date of ACP Conversation: 01/27/20  Persons included in Conversation:  patient  Length of ACP Conversation in minutes:  <16 minutes (Non-Billable)    Authorized Decision Maker (if patient is incapable of making informed decisions): This person is: Other Legally Authorized Decision Maker (e.g. Next of Kin)            For Patients with Decision Making Capacity:   Values/Goals: Exploration of values, goals, and preferences if recovery is not expected, even with continued medical treatment in the event of:  Imminent death  Severe, permanent brain injury  \"In these circumstances, what matters most to you? \"  Care focused more on comfort or quality of life. Conversation Outcomes / Follow-Up Plan:   Recommended completion of Advance Directive form after review of ACP materials and conversation with prospective healthcare agent   Recommended communicating the plan and making copies for the healthcare agent, personal physician, and others as appropriate (e.g., health system)  Recommended review of completed ACP document annually or upon change in health status     Blank form reviewed and given to patient for completion. May return to office if any assistance is needed. Will f/u on status at next visit.     Sinan Monroe NP  01/27/20

## 2020-01-27 NOTE — PROGRESS NOTES
This is the Subsequent Medicare Annual Wellness Exam, performed 12 months or more after the Initial AWV or the last Subsequent AWV    I have reviewed the patient's medical history in detail and updated the computerized patient record. History     Patient Active Problem List   Diagnosis Code    GERD (gastroesophageal reflux disease) K21.9    Obesity (BMI 30-39. 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 9)     Psychiatric disorder     depression/ not currently taking    Vertigo     Vestibulopathy 7/27/2016      Past Surgical History:   Procedure Laterality Date    COLONOSCOPY N/A 5/22/2018    COLONOSCOPY performed by Carli Michael MD at [facility] HX COLONOSCOPY  10/25/2011    1 polyp which was removed, diverticulosis in distal descending colon & sigmoid colon.  HX ENDOSCOPY  04/2014    HX GYN  1996    hysterectomy    HX ORTHOPAEDIC Left     foot surgery    HX OTHER SURGICAL  2006? L Lower Abd. Basal Cell CA    HX TONSILLECTOMY  1958     Current Outpatient Medications   Medication Sig Dispense Refill    budesonide/formoterol fumarate (SYMBICORT IN) Take  by inhalation.  triamterene-hydroCHLOROthiazide (MAXZIDE) 37.5-25 mg per tablet Take 1 Tab by mouth daily. 90 Tab 3    rosuvastatin (CRESTOR) 10 mg tablet Take 1 Tab by mouth nightly.  90 Tab 3    PARoxetine (PAXIL) 20 mg tablet TAKE 1 TABLET BY MOUTH DAILY 90 Tab 3  potassium chloride (K-DUR, KLOR-CON) 20 mEq tablet Take one tablet daily 270 Tab 3    montelukast (SINGULAIR) 10 mg tablet TAKE 1 TABLET DAILY 90 Tab 3    levocetirizine (XYZAL) 5 mg tablet Take one tablet daily 90 Tab 3    losartan (COZAAR) 25 mg tablet Take one tablet daily 90 Tab 3    lansoprazole (PREVACID) 30 mg capsule TAKE 1 CAPSULE DAILY BEFORE BREAKFAST 90 Cap 4    albuterol (PROVENTIL VENTOLIN) 2.5 mg /3 mL (0.083 %) nebulizer solution 3 mL by Nebulization route every four (4) hours as needed for Wheezing. 24 Each 1    Nebulizer & Compressor machine Use as directed 1 Each 0    ipratropium (ATROVENT) 0.02 % nebulizer solution 2.5 mL by Nebulization route as needed for Wheezing (every 6 hours as needed). 62.5 mL 0    albuterol (VENTOLIN HFA) 90 mcg/actuation inhaler Take 2 Puffs by inhalation every six (6) hours as needed for Wheezing or Shortness of Breath (may also use for cough).  1 Inhaler 3    DULERA 200-5 mcg/actuation HFA inhaler USE 2 INHALATIONS TWICE A DAY (Patient not taking: Reported on 2020) 3 Inhaler 2     Allergies   Allergen Reactions    Loratadine Nausea Only and Other (comments)     Muscle weakness       Family History   Problem Relation Age of Onset    Hypertension Mother     Stroke Maternal Grandmother     Cancer Paternal Grandmother         breast cancer    Breast Cancer Paternal Grandmother      Social History     Tobacco Use    Smoking status: Former Smoker     Packs/day: 1.00     Years: 18.00     Pack years: 18.00     Types: Cigarettes     Last attempt to quit: 1988     Years since quittin.5    Smokeless tobacco: Never Used    Tobacco comment: Quit in    Substance Use Topics    Alcohol use: Yes     Comment: rarely       Depression Risk Factor Screening:     3 most recent PHQ Screens 2020   Little interest or pleasure in doing things Not at all   Feeling down, depressed, irritable, or hopeless Not at all   Total Score PHQ 2 0       Alcohol Risk Factor Screening:   Do you average 1 drink per night or more than 7 drinks a week:  No    On any one occasion in the past three months have you have had more than 3 drinks containing alcohol:  No      Functional Ability and Level of Safety:   Hearing: Hearing is good. Activities of Daily Living: The home contains: handrails  Patient does total self care    Ambulation: with no difficulty    Fall Risk:  Fall Risk Assessment, last 12 mths 1/27/2020   Able to walk? Yes   Fall in past 12 months? No   Fall with injury? -   Number of falls in past 12 months -   Fall Risk Score -       Abuse Screen:  Patient is not abused    Cognitive Screening   Has your family/caregiver stated any concerns about your memory: no      Healthy Maintenance review:  Flu vaccine this season: yes  PSSV 23: no  Tdap: 2018  Colonoscopy: 2018  Mammogram: 2018  Pap: hysterectomy    Patient Care Team   Patient Care Team:  Cassie Telles NP as PCP - General (Nurse Practitioner)  Cassie Telles NP as PCP - [facility] Empaneled Provider  Leilani Azul MD (Hematology and Oncology)  Della Rico MD (Gastroenterology)  Luis Christiansen MD as Physician (Pulmonary Disease)    Assessment/Plan   Education and counseling provided:  End-of-Life planning (with patient's consent)  Pneumococcal Vaccine  Screening Mammography  Bone mass measurement (DEXA)  Screening for glaucoma    Diagnoses and all orders for this visit:    1. Medicare annual wellness visit, subsequent    2. Advanced directives, counseling/discussion  See acp note 01/27/20    3. Screening for alcoholism  Negative screening  -     [place] ANNUAL ALCOHOL SCREEN 15 MIN    4. Screening for depression  -     DEPRESSION SCREEN ANNUAL    5. Need for hepatitis C screening test  -     HEPATITIS C AB; Future    6. Encounter for screening mammogram for malignant neoplasm of breast  -     ODESSA MAMMO BI SCREENING INCL CAD; Future    7.  Postmenopausal state  -     DEXA BONE DENSITY STUDY AXIAL; Future    8. Encounter for immunization  -     ADMIN PNEUMOCOCCAL VACCINE  -     PNEUMOCOCCAL POLYSACCHARIDE VACCINE, 23-VALENT, ADULT OR IMMUNOSUPPRESSED PT DOSE,    Health Maintenance Due   Topic Date Due    Hepatitis C Screening  1950    Shingrix Vaccine Age 50> (1 of 2) 04/21/2002    GLAUCOMA SCREENING Q2Y  04/21/2017    Bone Densitometry (Dexa) Screening  04/21/2017     Follow up: next AWV due in 12 months. I have discussed the diagnosis with the patient and the intended plan as seen in the above orders. The patient has received an after-visit summary and questions were answered concerning future plans. Patient conveyed understanding of the plan at the time of the visit.     Latisha Pacheco NP  01/27/20

## 2020-02-06 DIAGNOSIS — F41.1 GAD (GENERALIZED ANXIETY DISORDER): ICD-10-CM

## 2020-02-06 DIAGNOSIS — E78.00 HYPERCHOLESTEROLEMIA: ICD-10-CM

## 2020-02-06 NOTE — TELEPHONE ENCOUNTER
LOV: 01/27/2020    Confirmed patient would like medications sent to [address] [place] [address] on file

## 2020-02-07 ENCOUNTER — APPOINTMENT (OUTPATIENT)
Dept: GENERAL RADIOLOGY | Age: 70
End: 2020-02-07
Attending: PHYSICIAN ASSISTANT
Payer: MEDICARE

## 2020-02-07 ENCOUNTER — HOSPITAL ENCOUNTER (EMERGENCY)
Age: 70
Discharge: HOME | End: 2020-02-07
Attending: STUDENT IN AN ORGANIZED HEALTH CARE EDUCATION/TRAINING PROGRAM
Payer: MEDICARE

## 2020-02-07 VITALS
DIASTOLIC BLOOD PRESSURE: 85 MMHG | OXYGEN SATURATION: 93 % | SYSTOLIC BLOOD PRESSURE: 143 MMHG | TEMPERATURE: 97.8 F | WEIGHT: 220 LBS | BODY MASS INDEX: 37.56 KG/M2 | HEART RATE: 56 BPM | RESPIRATION RATE: 12 BRPM | HEIGHT: 64 IN

## 2020-02-07 DIAGNOSIS — R07.9 CHEST PAIN, UNSPECIFIED TYPE: Primary | ICD-10-CM

## 2020-02-07 LAB
ANION GAP SERPL CALC-SCNC: 4 MMOL/L (ref 5–15)
BASOPHILS # BLD: 0.1 K/UL (ref 0–0.1)
BASOPHILS NFR BLD: 1 % (ref 0–1)
BUN SERPL-MCNC: 14 MG/DL (ref 6–20)
BUN/CREAT SERPL: 15 (ref 12–20)
CALCIUM SERPL-MCNC: 8.7 MG/DL (ref 8.5–10.1)
CHLORIDE SERPL-SCNC: 109 MMOL/L (ref 97–108)
CO2 SERPL-SCNC: 29 MMOL/L (ref 21–32)
COMMENT, HOLDF: NORMAL
CREAT SERPL-MCNC: 0.93 MG/DL (ref 0.55–1.02)
DIFFERENTIAL METHOD BLD: ABNORMAL
EOSINOPHIL # BLD: 0.4 K/UL (ref 0–0.4)
EOSINOPHIL NFR BLD: 4 % (ref 0–7)
ERYTHROCYTE [DISTWIDTH] IN BLOOD BY AUTOMATED COUNT: 15.4 % (ref 11.5–14.5)
GLUCOSE SERPL-MCNC: 93 MG/DL (ref 65–100)
HCT VFR BLD AUTO: 37 % (ref 35–47)
HGB BLD-MCNC: 11.4 G/DL (ref 11.5–16)
IMM GRANULOCYTES # BLD AUTO: 0 K/UL (ref 0–0.04)
IMM GRANULOCYTES NFR BLD AUTO: 0 % (ref 0–0.5)
LYMPHOCYTES # BLD: 1.5 K/UL (ref 0.8–3.5)
LYMPHOCYTES NFR BLD: 16 % (ref 12–49)
MCH RBC QN AUTO: 23.7 PG (ref 26–34)
MCHC RBC AUTO-ENTMCNC: 30.8 G/DL (ref 30–36.5)
MCV RBC AUTO: 76.8 FL (ref 80–99)
MONOCYTES # BLD: 0.8 K/UL (ref 0–1)
MONOCYTES NFR BLD: 9 % (ref 5–13)
NEUTS SEG # BLD: 6.5 K/UL (ref 1.8–8)
NEUTS SEG NFR BLD: 70 % (ref 32–75)
NRBC # BLD: 0 K/UL (ref 0–0.01)
NRBC BLD-RTO: 0 PER 100 WBC
PLATELET # BLD AUTO: 224 K/UL (ref 150–400)
PMV BLD AUTO: 9.3 FL (ref 8.9–12.9)
POTASSIUM SERPL-SCNC: 3.7 MMOL/L (ref 3.5–5.1)
RBC # BLD AUTO: 4.82 M/UL (ref 3.8–5.2)
SAMPLES BEING HELD,HOLD: NORMAL
SODIUM SERPL-SCNC: 142 MMOL/L (ref 136–145)
TROPONIN I SERPL-MCNC: <0.05 NG/ML
TROPONIN I SERPL-MCNC: <0.05 NG/ML
WBC # BLD AUTO: 9.3 K/UL (ref 3.6–11)

## 2020-02-07 PROCEDURE — 36415 COLL VENOUS BLD VENIPUNCTURE: CPT

## 2020-02-07 PROCEDURE — 93005 ELECTROCARDIOGRAM TRACING: CPT

## 2020-02-07 PROCEDURE — 85025 COMPLETE CBC W/AUTO DIFF WBC: CPT

## 2020-02-07 PROCEDURE — 80048 BASIC METABOLIC PNL TOTAL CA: CPT

## 2020-02-07 PROCEDURE — 71046 X-RAY EXAM CHEST 2 VIEWS: CPT

## 2020-02-07 PROCEDURE — 99285 EMERGENCY DEPT VISIT HI MDM: CPT

## 2020-02-07 PROCEDURE — 84484 ASSAY OF TROPONIN QUANT: CPT

## 2020-02-07 PROCEDURE — 74011250637 HC RX REV CODE- 250/637: Performed by: PHYSICIAN ASSISTANT

## 2020-02-07 RX ORDER — GUAIFENESIN 100 MG/5ML
162 LIQUID (ML) ORAL
Status: COMPLETED | OUTPATIENT
Start: 2020-02-07 | End: 2020-02-07

## 2020-02-07 RX ORDER — PAROXETINE HYDROCHLORIDE 20 MG/1
TABLET, FILM COATED ORAL
Qty: 90 TAB | Refills: 3 | Status: SHIPPED | OUTPATIENT
Start: 2020-02-07 | End: 2020-04-14

## 2020-02-07 RX ORDER — LANSOPRAZOLE 30 MG/1
CAPSULE, DELAYED RELEASE ORAL
Qty: 90 CAP | Refills: 3 | Status: SHIPPED | OUTPATIENT
Start: 2020-02-07 | End: 2020-04-01 | Stop reason: ALTCHOICE

## 2020-02-07 RX ORDER — ROSUVASTATIN CALCIUM 10 MG/1
10 TABLET, COATED ORAL
Qty: 90 TAB | Refills: 3 | Status: SHIPPED | OUTPATIENT
Start: 2020-02-07 | End: 2021-05-18

## 2020-02-07 RX ORDER — LANSOPRAZOLE 30 MG/1
CAPSULE, DELAYED RELEASE ORAL
Qty: 90 CAP | Refills: 4 | OUTPATIENT
Start: 2020-02-07

## 2020-02-07 RX ADMIN — ASPIRIN 81 MG 162 MG: 81 TABLET ORAL at 12:23

## 2020-02-07 NOTE — ED TRIAGE NOTES
Pt reports left sided chest discomfort that started today, accompanied with left arm tingling. States that she had a feeling of chest fullness this week.

## 2020-02-07 NOTE — ED PROVIDER NOTES
I have evaluated the patient as the Provider in Triage. I have reviewed Her vital signs and the triage nurse assessment. I have talked with the patient and any available family and advised that I am the provider in triage and have ordered the appropriate study to initiate their work up based on the clinical presentation during my assessment. I have advised that the patient will be accommodated in the Main ED as soon as possible. I have also requested to contact the triage nurse or myself immediately if the patient experiences any changes in their condition during this brief waiting period. Note written by Lidia Rinne, Scribe, as dictated by Marylen Dawn, PA-C 12:03 PM    ----------    81 y.o. female with past medical history significant for COPD, vertigo, Vestibulopathy, anemia, asthma, HTN, bradycardia who presents via personal vehicle with chief complaint of chest pain. Pt c/o squeezing left sided chest pain underneath her left breast which began around 0900 this morning. Describes the chest pain as pleuritic but non exertional. Reports the pain lasted for about 1 hour before completely going away. Pt notes accompanying left arm tingling and also reports intermittent chest pressure this week. Also mild SOB but states this is her baseline due to COPD. Pt denies LE edema, LE pain, N/V, diaphoresis, dizziness, or any current pain     There are no other acute medical concerns at this time. Surgical hx - colon polyp removal, hysterectomy, LLQ abdominal wall basal cell carcinoma removal    Social hx - Tobacco use: former smoker (quit in 1988 - 18 pack years), Alcohol Use: rarely, Drug Use: denies    PCP: Dorothy Mack NP    Note written by Becca Mike, as dictated by Andrez Asher DO 4:16 PM.      The history is provided by the patient. No  was used.         Past Medical History:   Diagnosis Date    Anemia     Asthma     Bradycardia 7/26/2016    GERD (gastroesophageal reflux disease)     Hypercholesterolemia     Hypertension     Obesity (BMI 30-39. 9)     Psychiatric disorder     depression/ not currently taking    Vertigo     Vestibulopathy 2016       Past Surgical History:   Procedure Laterality Date    COLONOSCOPY N/A 2018    COLONOSCOPY performed by Robinson Cuadra [facility] at [facility] HX COLONOSCOPY  10/25/2011    1 polyp which was removed, diverticulosis in distal descending colon & sigmoid colon.  HX ENDOSCOPY  2014    HX GYN  1996    hysterectomy    HX ORTHOPAEDIC Left     foot surgery    HX OTHER SURGICAL  2006? L Lower Abd.  Basal Cell CA    HX TONSILLECTOMY           Family History:   Problem Relation Age of Onset    Hypertension Mother     Stroke Maternal Grandmother     Cancer Paternal Grandmother         breast cancer    Breast Cancer Paternal Grandmother        Social History     Socioeconomic History    Marital status:      Spouse name: Not on file    Number of children: Not on file    Years of education: Not on file    Highest education level: Not on file   Occupational History    Not on file   Social Needs    Financial resource strain: Not on file    Food insecurity:     Worry: Not on file     Inability: Not on file    Transportation needs:     Medical: Not on file     Non-medical: Not on file   Tobacco Use    Smoking status: Former Smoker     Packs/day: 1.00     Years: 18.00     Pack years: 18.00     Types: Cigarettes     Last attempt to quit: 1988     Years since quittin.5    Smokeless tobacco: Never Used    Tobacco comment: Quit in    Substance and Sexual Activity    Alcohol use: Yes     Comment: rarely    Drug use: No    Sexual activity: Yes     Partners: Male     Birth control/protection: Surgical     Comment: Hysterectomy   Lifestyle    Physical activity:     Days per week: Not on file     Minutes per session: Not on file    Stress: Not on file Relationships    Social connections:     Talks on phone: Not on file     Gets together: Not on file     Attends Hoahaoism service: Not on file     Active member of club or organization: Not on file     Attends meetings of clubs or organizations: Not on file     Relationship status: Not on file    Intimate partner violence:     Fear of current or ex partner: Not on file     Emotionally abused: Not on file     Physically abused: Not on file     Forced sexual activity: Not on file   Other Topics Concern    Not on file   Social History Narrative    Not on file         ALLERGIES: Loratadine    Review of Systems   Constitutional: Negative for chills, diaphoresis and fever. HENT: Negative for congestion and rhinorrhea. Eyes: Negative for redness and visual disturbance. Respiratory: Positive for shortness of breath. Negative for cough. Cardiovascular: Positive for chest pain. Negative for leg swelling. Gastrointestinal: Negative for abdominal pain, diarrhea, nausea and vomiting. Genitourinary: Negative for dysuria, flank pain, frequency, hematuria and urgency. Musculoskeletal: Negative for arthralgias, back pain, myalgias and neck pain. Skin: Negative for rash and wound. Allergic/Immunologic: Negative for immunocompromised state. Neurological: Negative for dizziness and headaches. All other systems reviewed and are negative. Vitals:    02/07/20 1600 02/07/20 1615 02/07/20 1710 02/07/20 1745   BP: 137/84 129/83  143/85   Pulse: (!) 54 (!) 55  (!) 56   Resp: 13 21  12   Temp:       SpO2: 100% 98% 97% 93%   Weight:       Height:                Physical Exam  Vitals signs and nursing note reviewed. Constitutional:       General: She is not in acute distress. Appearance: She is well-developed. She is not diaphoretic. HENT:      Head: Normocephalic. Mouth/Throat:      Pharynx: No oropharyngeal exudate. Eyes:      General:         Right eye: No discharge.          Left eye: No discharge. Pupils: Pupils are equal, round, and reactive to light. Neck:      Musculoskeletal: Normal range of motion and neck supple. Cardiovascular:      Rate and Rhythm: Normal rate and regular rhythm. Heart sounds: Normal heart sounds. No murmur. No friction rub. No gallop. Pulmonary:      Effort: Pulmonary effort is normal. No respiratory distress. Breath sounds: Normal breath sounds. No stridor. No wheezing or rales. Abdominal:      General: Bowel sounds are normal. There is no distension. Palpations: Abdomen is soft. Tenderness: There is no abdominal tenderness. There is no guarding or rebound. Musculoskeletal: Normal range of motion. General: No deformity. Skin:     General: Skin is warm and dry. Capillary Refill: Capillary refill takes less than 2 seconds. Findings: No rash. Neurological:      Mental Status: She is alert and oriented to person, place, and time. Psychiatric:         Behavior: Behavior normal.        Note written by Becca Bolaños, as dictated by Thuan Lieberman DO 4:16 PM      EKG Interpretation:   ED Physician interpretation  Rhythm: sinus bradycardia; and regular . Rate (approx.): 59; Axis: normal; ST wave: no acute changes; Note written by Becca Bolaños, as dictated by Thuan Lieberman DO 12:11PM      Labs Reviewed:   Mild anemia without leukocytosis  Troponin x2 neg  Normal renal function and electrolytes    Imaging Reviewed:     Chest x-ray negative for acute process    Course:  Reevaluated, remains painless while in the emergency department        MDM:  53-year-old female presenting to the emergency department today secondary to chest pain. She reports that the pain started this morning around 9 AM.  It lasted about an hour and resolved on its own. Not exertional.  No diaphoresis. Shortness of breath at baseline. No leg pain or leg swelling. No history of DVT or PE.   No recent immobility. Low suspicion for PE given minimal risk factors with stable vital signs. Troponin x2- with nonischemic EKG makes ACS unlikely. Chest x-ray shows pulmonary nodule with seemed chronic and stable, patient informed of this. No evidence of pneumonia, pneumothorax, pulmonary edema or pleural effusion. Low suspicion for dissection. I suspect this could be GI versus musculoskeletal.  Patient was discharged home but given very strict return precautions. Clinical Impression:     ICD-10-CM ICD-9-CM    1.  Chest pain, unspecified type R07.9 786.50            Disposition: GENARO Albarran, DO

## 2020-02-07 NOTE — DISCHARGE INSTRUCTIONS
PLEASE RETURN IF WORSENING PAIN OR IF YOU NOTICE IT WHEN EXERTING YOURSELF, TROUBLE BREATHING, SWEATINESS, OR DIZZINESS. FOLLOW UP WITH YOUR DOCTOR IN THE NEXT 2-3 DAYS. YOU WERE NOTED TO HAVE A PULMONARY NODULE ON CHEST XRAY TODAY. IT APPEARED OLD BUT PLEASE HAVE YOUR PRIMARY DOCTOR FOLLOW THIS UP.

## 2020-02-07 NOTE — TELEPHONE ENCOUNTER
Requested Prescriptions     Pending Prescriptions Disp Refills    lansoprazole (PREVACID) 30 mg capsule 90 Cap 4     Sig: TAKE 1 CAPSULE DAILY BEFORE BREAKFAST

## 2020-02-08 LAB
ATRIAL RATE: 59 BPM
CALCULATED P AXIS, ECG09: 39 DEGREES
CALCULATED R AXIS, ECG10: 18 DEGREES
CALCULATED T AXIS, ECG11: 58 DEGREES
DIAGNOSIS, 93000: NORMAL
P-R INTERVAL, ECG05: 154 MS
Q-T INTERVAL, ECG07: 442 MS
QRS DURATION, ECG06: 96 MS
QTC CALCULATION (BEZET), ECG08: 437 MS
VENTRICULAR RATE, ECG03: 59 BPM

## 2020-03-31 ENCOUNTER — TELEPHONE (OUTPATIENT)
Dept: FAMILY MEDICINE CLINIC | Age: 70
End: 2020-03-31

## 2020-03-31 NOTE — TELEPHONE ENCOUNTER
Received a fax from [facility] suggesting changing lansoprazole Dr to the \"preferred lower cost alternative\" pantoprazole .

## 2020-04-01 RX ORDER — PANTOPRAZOLE SODIUM 40 MG/1
40 TABLET, DELAYED RELEASE ORAL DAILY
Qty: 90 TAB | Refills: 1 | Status: SHIPPED | OUTPATIENT
Start: 2020-04-01 | End: 2020-11-09 | Stop reason: SDUPTHER

## 2020-04-01 NOTE — TELEPHONE ENCOUNTER
Didn't you call the patient last week to see if she wanted to be changed to the alternative? What was the answer?

## 2020-04-01 NOTE — TELEPHONE ENCOUNTER
Attempted to reach pt. Patient x2 id verified. Pt stated that she is okay changing it to alternative medication.

## 2020-04-14 DIAGNOSIS — F41.1 GAD (GENERALIZED ANXIETY DISORDER): ICD-10-CM

## 2020-04-14 RX ORDER — PAROXETINE HYDROCHLORIDE 20 MG/1
TABLET, FILM COATED ORAL
Qty: 90 TAB | Refills: 3 | Status: SHIPPED | OUTPATIENT
Start: 2020-04-14 | End: 2021-05-03

## 2020-05-27 ENCOUNTER — OFFICE VISIT (OUTPATIENT)
Dept: PRIMARY CARE CLINIC | Age: 70
End: 2020-05-27

## 2020-05-27 DIAGNOSIS — Z01.818 PREOP TESTING: Primary | ICD-10-CM

## 2020-05-27 NOTE — PERIOP NOTES
[address]  Endoscopy Preprocedure Instructions      1. On the day of your surgery, please report to registration located on the 2nd floor of the  [facility]. yes    2. You must have a responsible adult to drive you to the hospital, stay at the hospital during your procedure and drive you home. If they leave your procedure will not be started (no exceptions). yes    3. Do not have anything to eat or drink (including water, gum, mints, coffee, and juice) after midnight. This does not apply to the medications you were instructed to take by your physician. yesIf you are currently taking Plavix, Coumadin, Aspirin, or other blood-thinning agents, contact your physician for special instructions. not applicable,    4. If you are having a procedure that requires bowel prep: We recommend that you have only clear liquids the day before your procedure and begin your bowel prep by 5:00 pm.  You may continue to drink clear liquids until midnight. If for any reason you are not able to complete your prep please notify your physician immediately. yes    5. Have a list of all current medications, including vitamins, herbal supplements and any other over the counter medications. yes    6. If you wear glasses, contacts, dentures and/or hearing aids, they may be removed prior to procedure, please bring a case to store them in. yes    7. You should understand that if you do not follow these instructions your procedure may be cancelled. If your physical condition changes (I.e. fever, cold or flu) please contact your doctor as soon as possible. 8. It is important that you be on time.   If for any reason you are unable to keep your appointment please call [phone] the day of or your physicians office prior to your scheduled procedure

## 2020-05-29 LAB — SARS-COV-2, NAA: NOT DETECTED

## 2020-06-01 NOTE — PROGRESS NOTES
Your COVID test was negative. Make sure you continue to use a mask in public and use good handwashing for prevention.  Author Flakes

## 2020-06-02 ENCOUNTER — HOSPITAL ENCOUNTER (OUTPATIENT)
Age: 70
Setting detail: OUTPATIENT SURGERY
Discharge: HOME | End: 2020-06-02
Attending: SPECIALIST | Admitting: SPECIALIST
Payer: MEDICARE

## 2020-06-02 ENCOUNTER — ANESTHESIA EVENT (OUTPATIENT)
Dept: ENDOSCOPY | Age: 70
End: 2020-06-02
Payer: MEDICARE

## 2020-06-02 ENCOUNTER — ANESTHESIA (OUTPATIENT)
Dept: ENDOSCOPY | Age: 70
End: 2020-06-02
Payer: MEDICARE

## 2020-06-02 VITALS
SYSTOLIC BLOOD PRESSURE: 118 MMHG | HEIGHT: 64 IN | BODY MASS INDEX: 37.07 KG/M2 | OXYGEN SATURATION: 96 % | TEMPERATURE: 97.7 F | RESPIRATION RATE: 12 BRPM | HEART RATE: 68 BPM | DIASTOLIC BLOOD PRESSURE: 62 MMHG | WEIGHT: 217.15 LBS

## 2020-06-02 PROCEDURE — C1726 CATH, BAL DIL, NON-VASCULAR: HCPCS | Performed by: SPECIALIST

## 2020-06-02 PROCEDURE — 77030018712 HC DEV BLLN INFL BSC -B: Performed by: SPECIALIST

## 2020-06-02 PROCEDURE — 76040000007: Performed by: SPECIALIST

## 2020-06-02 PROCEDURE — 77030013992 HC SNR POLYP ENDOSC BSC -B: Performed by: SPECIALIST

## 2020-06-02 PROCEDURE — 74011250636 HC RX REV CODE- 250/636: Performed by: NURSE ANESTHETIST, CERTIFIED REGISTERED

## 2020-06-02 PROCEDURE — 88305 TISSUE EXAM BY PATHOLOGIST: CPT

## 2020-06-02 PROCEDURE — 77030022875 HC PRB AR PLSM COAG ERBE -C: Performed by: SPECIALIST

## 2020-06-02 PROCEDURE — 77030021593 HC FCPS BIOP ENDOSC BSC -A: Performed by: SPECIALIST

## 2020-06-02 PROCEDURE — 74011000250 HC RX REV CODE- 250: Performed by: NURSE ANESTHETIST, CERTIFIED REGISTERED

## 2020-06-02 PROCEDURE — 88312 SPECIAL STAINS GROUP 1: CPT

## 2020-06-02 PROCEDURE — 76060000032 HC ANESTHESIA 0.5 TO 1 HR: Performed by: SPECIALIST

## 2020-06-02 PROCEDURE — 74011250636 HC RX REV CODE- 250/636: Performed by: SPECIALIST

## 2020-06-02 RX ORDER — FLUMAZENIL 0.1 MG/ML
0.2 INJECTION INTRAVENOUS
Status: DISCONTINUED | OUTPATIENT
Start: 2020-06-02 | End: 2020-06-02 | Stop reason: HOSPADM

## 2020-06-02 RX ORDER — DEXTROMETHORPHAN/PSEUDOEPHED 2.5-7.5/.8
1.2 DROPS ORAL
Status: DISCONTINUED | OUTPATIENT
Start: 2020-06-02 | End: 2020-06-02 | Stop reason: HOSPADM

## 2020-06-02 RX ORDER — FENTANYL CITRATE 50 UG/ML
25 INJECTION, SOLUTION INTRAMUSCULAR; INTRAVENOUS AS NEEDED
Status: DISCONTINUED | OUTPATIENT
Start: 2020-06-02 | End: 2020-06-02 | Stop reason: HOSPADM

## 2020-06-02 RX ORDER — GLYCOPYRROLATE 0.2 MG/ML
INJECTION INTRAMUSCULAR; INTRAVENOUS AS NEEDED
Status: DISCONTINUED | OUTPATIENT
Start: 2020-06-02 | End: 2020-06-02 | Stop reason: HOSPADM

## 2020-06-02 RX ORDER — MIDAZOLAM HYDROCHLORIDE 1 MG/ML
.25-5 INJECTION, SOLUTION INTRAMUSCULAR; INTRAVENOUS AS NEEDED
Status: DISCONTINUED | OUTPATIENT
Start: 2020-06-02 | End: 2020-06-02 | Stop reason: HOSPADM

## 2020-06-02 RX ORDER — LIDOCAINE HYDROCHLORIDE 20 MG/ML
INJECTION, SOLUTION EPIDURAL; INFILTRATION; INTRACAUDAL; PERINEURAL AS NEEDED
Status: DISCONTINUED | OUTPATIENT
Start: 2020-06-02 | End: 2020-06-02 | Stop reason: HOSPADM

## 2020-06-02 RX ORDER — NALOXONE HYDROCHLORIDE 0.4 MG/ML
0.4 INJECTION, SOLUTION INTRAMUSCULAR; INTRAVENOUS; SUBCUTANEOUS
Status: DISCONTINUED | OUTPATIENT
Start: 2020-06-02 | End: 2020-06-02 | Stop reason: HOSPADM

## 2020-06-02 RX ORDER — PROPOFOL 10 MG/ML
INJECTION, EMULSION INTRAVENOUS
Status: DISCONTINUED | OUTPATIENT
Start: 2020-06-02 | End: 2020-06-02 | Stop reason: HOSPADM

## 2020-06-02 RX ORDER — PROPOFOL 10 MG/ML
INJECTION, EMULSION INTRAVENOUS AS NEEDED
Status: DISCONTINUED | OUTPATIENT
Start: 2020-06-02 | End: 2020-06-02 | Stop reason: HOSPADM

## 2020-06-02 RX ORDER — SODIUM CHLORIDE 9 MG/ML
50 INJECTION, SOLUTION INTRAVENOUS CONTINUOUS
Status: DISCONTINUED | OUTPATIENT
Start: 2020-06-02 | End: 2020-06-02 | Stop reason: HOSPADM

## 2020-06-02 RX ADMIN — PROPOFOL 40 MG: 10 INJECTION, EMULSION INTRAVENOUS at 10:20

## 2020-06-02 RX ADMIN — SODIUM CHLORIDE: 9 INJECTION, SOLUTION INTRAVENOUS at 09:54

## 2020-06-02 RX ADMIN — GLYCOPYRROLATE 0.2 MG: 0.2 INJECTION, SOLUTION INTRAMUSCULAR; INTRAVENOUS at 10:29

## 2020-06-02 RX ADMIN — PROPOFOL 140 MCG/KG/MIN: 10 INJECTION, EMULSION INTRAVENOUS at 10:11

## 2020-06-02 RX ADMIN — LIDOCAINE HYDROCHLORIDE 40 MG: 20 INJECTION, SOLUTION INTRAVENOUS at 10:34

## 2020-06-02 RX ADMIN — PROPOFOL 100 MG: 10 INJECTION, EMULSION INTRAVENOUS at 10:11

## 2020-06-02 NOTE — DISCHARGE INSTRUCTIONS
[address] [facility] ISAÍAS Chang MD  [phone]      June 2, 2020    Monae Hernandez  YOB: 1950    COLONOSCOPY DISCHARGE INSTRUCTIONS    If there is redness at IV site you should apply warm compress to area. If redness or soreness persist contact Dr. Anuj Chang' or your primary care doctor. There may be a slight amount of blood passed from the rectum. Gaseous discomfort may develop, but walking, belching will help relieve this. You may not operate a vehicle for 12 hours  You may not operate machinery or dangerous appliances for rest of today  You may not drink alcoholic beverages for 12 hours  Avoid making any critical decisions for 24 hours    DIET:  You may resume your normal diet, but some patients find that heavy or large meals may lead to indigestion or vomiting. I suggest a light meal as first food intake. MEDICATIONS:  The use of some over-the-counter pain medication may lead to bleeding after colon biopsies or polyp removal.  Tylenol (also called acetaminophen) is safe to take even if you have had colonoscopy with polyp removal.  Based on the procedure you had today you may not safely take aspirin or aspirin-like products for the next ten (10) days. Remember that Tylenol (also called acetaminophen) is safe to take after colonoscopy even if you have had biopsies or polyps removed. ACTIVITY:  You may resume your normal household activities, but it is recommended that you spend the remainder of the day resting -  avoid any strenuous activity. CALL DR. Sharyn Leonardo' OFFICE IF:  Increasing pain, nausea, vomiting  Abdominal distension (swelling)  Significant new or increased bleeding (oral or rectal)  Fever/Chills  Chest pain/shortness of breath                       Additional instructions:   No aspirin 10 days. We found 2 small polyps and removed them.   We found a narrowing of the esophagus probably from acid reflux that we dilated/stretched. You should take pantoprazole every day for 2 months after today's procedure. We found some AVM of the colon which we ablated today. We found that the left side of the colon feels like it is 'fixed' or that there are adhesions/scar tissue in the abdomen that may be the cause of the problems you've had with your bowel habits. For that I suggest you take benefiber (OTC fiber supplement, or metamucil if you prefer) twice daily and try the prescription for dicyclomine that I sent directly to your pharmacy. We'll have an office visit or telehealth visit in 4 weeks to review your symptoms. It was an honor to be your doctor today. Please let me or my office staff know if you have any feedback about today's procedure. Nicole Dillon MD    Colonoscopy saves lives, and can prevent colon cancer. Everyone aged 48 or older needs colonoscopy.   Tell your family and friends: get the test!

## 2020-06-02 NOTE — PROCEDURES
[address] [facility] ISAÍAS Lewis MD  [phone]      2020    Esophagogastroduodenoscopy & Colonoscopy Procedure Note  Orlando Dietrich  : 1950  [facility] Medical Record Number: [id number]      Indications:    Anemia Change in bowel habits , Hematochezia/melena  and Diverticulitis  Referring Physician:  Chandrika Tamayo NP  Anesthesia/Sedation: Conscious Sedation/Moderate Sedation/MAC  Endoscopist:  Dr. Treasure Day  Complications:  None  Estimated Blood Loss:  None    Permit:  The indications, risks, benefits and alternatives were reviewed with the patient or their decision maker who was provided an opportunity to ask questions and all questions were answered. The specific risks of esophagogastroduodenoscopy with conscious sedation were reviewed, including but not limited to anesthetic complication, bleeding, adverse drug reaction, missed lesion, infection, IV site reactions, and intestinal perforation which would lead to the need for surgical repair. Alternatives to EGD and colonoscopy including radiographic imaging, observation without testing, or laboratory testing were reviewed as well as the limitations of those alternatives discussed. After considering the options and having all their questions answered, the patient or their decision maker provided both verbal and written consent to proceed. -----------EGD------------   Procedure in Detail:  After obtaining informed consent, positioning of the patient in the left lateral decubitus position, and conduction of a pre-procedure pause or \"time out\" the endoscope was introduced into the mouth and advanced to the duodenum. A careful inspection was made, and findings or interventions are described below. Findings:   Esophagus: There is no inflammatory change of the esophagus but at the EG junction there is an esophageal ring which is biopsied with cold forceps and dilated to 20mm with TTS balloon. No perforation. Cold forceps biopsies of the mid esophagus taken with cold forceps. Stomach: normal   Duodenum/jejunum: normal.  Cold forceps biopsies used to obtain samples of the duodenum for histology. ----------Colonoscopy-----------    Procedure in Detail:  After obtaining informed consent, positioning of the patient in the left lateral decubitus position, and conduction of a pre-procedure pause or \"time out\" the endoscope was introduced into the anus and advanced to the cecum, which was identified by the ileocecal valve and appendiceal orifice. The quality of the colonic preparation was adequate. A careful inspection was made as the colonoscope was withdrawn, findings and interventions are described below. Findings: There is a sharp angulation of the sigmoid colon with a sense of adfixation in this region. This leads to inability to pass with the colonoscope. I had to use an adult upper GI endoscope (with its more acute tip angulation) in order to safely navigate this region. With counter pressure from the assisting staff, despite the shorter length of the upper GI 'scope, we were able to fully intubate the cecum. Photodocumentation obtained. There are several small AVM of the right colon. Using standard right colon settings on the APC device we ablated these and hemostasis noted. There are numerous diverticula, concentrated in the sigmoid and distal descending. There are two small colon polyps 4mm and 3mm taken from the sigmoid with cold snare and hemostasis confirmed. ------------------------------  Specimens:    See above    Complications:   None; patient tolerated the procedure well. Impressions:  EGD:  Esophageal ring, acquired. Probably related to GERD. Colonoscopy: Colon polyps, diverticulosis and right colon AVM. Recommendations:     - Await pathology.   -PPI x 2 months then as needed to control symptoms  -High fiber intake. If her lower GI symptoms persist there may be a role for consideration of lysis of adhesions and sigmoid colectomy as my findings today suggest that there may be a diverticular associated stricture/adhesive adfixation of the sigmoid. Thank you for entrusting me with this patient's care. Please do not hesitate to contact me with any questions or if I can be of assistance with any of your other patients' GI needs. Signed By: Lazarus Midget, MD                        June 2, 2020    Surgical assistant none. Implants none unless specified.

## 2020-06-02 NOTE — ANESTHESIA POSTPROCEDURE EVALUATION
Procedure(s):  COLONOSCOPY  ESOPHAGOGASTRODUODENOSCOPY (EGD)  ESOPHAGOGASTRODUODENAL (EGD) BIOPSY  ESOPHAGEAL DILATION  ENDOSCOPIC ARGON PLASMA COAGULATION  ENDOSCOPIC POLYPECTOMY. MAC    Anesthesia Post Evaluation      Multimodal analgesia: multimodal analgesia used between 6 hours prior to anesthesia start to PACU discharge  Patient location during evaluation: bedside  Patient participation: complete - patient participated  Level of consciousness: awake  Pain management: adequate  Airway patency: patent  Anesthetic complications: no  Cardiovascular status: acceptable  Respiratory status: acceptable  Hydration status: acceptable        INITIAL Post-op Vital signs:   Vitals Value Taken Time   /62 6/2/2020 11:27 AM   Temp 36.5 °C (97.7 °F) 6/2/2020 11:04 AM   Pulse 67 6/2/2020 11:29 AM   Resp 13 6/2/2020 11:29 AM   SpO2 98 % 6/2/2020 11:29 AM   Vitals shown include unvalidated device data.

## 2020-06-02 NOTE — PROGRESS NOTES
Anesthesia staff at patient's bedside administering anesthesia and monitoring patients vital signs throughout procedure. See anesthesia note. ABD remains soft and non-tender post procedure. Pt has no complaints at this time and tolerated the procedure well. Endoscope was pre-cleaned at bedside immediately following procedure by remington

## 2020-06-02 NOTE — INTERVAL H&P NOTE
Pre-Endoscopy H&P Update Chief complaint/HPI/ROS:  The indication for the procedure, the patient's history and the patient's current medications are reviewed prior to the procedure and that data is reported on the H&P to which this document is attached. Any significant complaints with regard to organ systems will be noted, and if not mentioned then a review of systems is not contributory. Past Medical History:  
Diagnosis Date  Anemia  Arthritis   
 knee right  Asthma  Bradycardia 2016  Chronic obstructive pulmonary disease ([address]  GERD (gastroesophageal reflux disease)  Hypercholesterolemia  Hypertension  Obesity (BMI 30-39. 9)  Psychiatric disorder   
 depression/ not currently taking  Vertigo  Vestibulopathy 2016 Past Surgical History:  
Procedure Laterality Date  COLONOSCOPY N/A 2018 COLONOSCOPY performed by Ania Tejeda MD at [address] [facility] HX COLONOSCOPY  10/25/2011 1 polyp which was removed, diverticulosis in distal descending colon & sigmoid colon.  HX ENDOSCOPY  2014 [address] [facility]  
 hysterectomy  HX ORTHOPAEDIC Left   
 foot surgery  HX OTHER SURGICAL  2006? L [place] Social  
Social History Tobacco Use  Smoking status: Former Smoker Packs/day: 1.00 Years: 18.00 Pack years: 18.00 Types: Cigarettes Last attempt to quit: 1988 Years since quittin.8  Smokeless tobacco: Never Used  Tobacco comment: Quit in  Substance Use Topics  Alcohol use: Yes Comment: rarely Family History Problem Relation Age of Onset  Hypertension Mother  Cancer Father  Stroke Maternal Grandmother  Cancer Paternal Grandmother   
     breast cancer  Breast Cancer Paternal Grandmother  Cancer Maternal Grandfather Allergies Allergen Reactions  Loratadine Nausea Only and Other (comments) Muscle weakness Prior to Admission Medications Prescriptions Last Dose Informant Patient Reported? Taking? Nebulizer & Compressor machine   No No  
Sig: Use as directed PARoxetine (PAXIL) 20 mg tablet 2020 at Unknown time  No Yes Sig: TAKE 1 TABLET BY MOUTH DAILY  
albuterol (PROVENTIL VENTOLIN) 2.5 mg /3 mL (0.083 %) nebulizer solution Unknown at Unknown time  No No  
Sig: 3 mL by Nebulization route every four (4) hours as needed for Wheezing. albuterol (VENTOLIN HFA) 90 mcg/actuation inhaler Unknown at Unknown time Self No No  
Sig: Take 2 Puffs by inhalation every six (6) hours as needed for Wheezing or Shortness of Breath (may also use for cough). budesonide/formoterol fumarate (SYMBICORT IN) 2020 at Unknown time  Yes Yes Sig: Take  by inhalation. ipratropium (ATROVENT) 0.02 % nebulizer solution Unknown at Unknown time  No No  
Si.5 mL by Nebulization route as needed for Wheezing (every 6 hours as needed). levocetirizine (XYZAL) 5 mg tablet 2020 at Unknown time  No Yes Sig: Take one tablet daily  
losartan (COZAAR) 25 mg tablet 2020 at Unknown time  No Yes Sig: Take one tablet daily  
montelukast (SINGULAIR) 10 mg tablet 2020 at Unknown time  No Yes Sig: TAKE 1 TABLET DAILY pantoprazole (PROTONIX) 40 mg tablet 2020 at Unknown time  No Yes Sig: Take 1 Tab by mouth daily. potassium chloride (K-DUR, KLOR-CON) 20 mEq tablet 2020 at Unknown time  No Yes Sig: Take one tablet daily  
rosuvastatin (CRESTOR) 10 mg tablet 2020 at Unknown time  No Yes Sig: Take 1 Tab by mouth nightly. triamterene-hydroCHLOROthiazide (MAXZIDE) 37.5-25 mg per tablet 2020 at Unknown time  No Yes Sig: Take 1 Tab by mouth daily. Facility-Administered Medications: None PHYSICAL EXAM:  The patient is examined immediately prior to the procedure. Visit Vitals /68 Pulse (!) 59 Temp 98.9 °F (37.2 °C) Resp 18 Ht 5' 4\" (1.626 m) Wt 98.5 kg (217 lb 2.5 oz) SpO2 100% Breastfeeding No  
BMI 37.27 kg/m² Gen: Appears comfortable, no distress. Pulm: comfortable respirations with no abnormal audible breath sounds HEART: well perfused, no abnormal audible heart sounds GI: abdomen flat. PLAN:  Informed consent discussion held, patient afforded an opportunity to ask questions and all questions answered. After being advised of the risks, benefits, and alternatives, the patient requested that we proceed and indicated so on a written consent form. Will proceed with procedure as planned.  
Davi Padilla MD

## 2020-06-02 NOTE — ANESTHESIA PREPROCEDURE EVALUATION
Anesthetic History   No history of anesthetic complications            Review of Systems / Medical History  Patient summary reviewed, nursing notes reviewed and pertinent labs reviewed    Pulmonary    COPD: moderate        Asthma : well controlled       Neuro/Psych         Psychiatric history     Cardiovascular    Hypertension          Hyperlipidemia    Exercise tolerance: >4 METS  Comments: Not on beta blocker   GI/Hepatic/Renal     GERD: well controlled           Endo/Other        Morbid obesity     Other Findings              Physical Exam    Airway  Mallampati: II  TM Distance: < 4 cm  Neck ROM: normal range of motion, short neck   Mouth opening: Normal     Cardiovascular  Regular rate and rhythm,  S1 and S2 normal,  no murmur, click, rub, or gallop  Rhythm: regular  Rate: normal         Dental    Dentition: Upper dentition intact and Lower dentition intact     Pulmonary  Breath sounds clear to auscultation               Abdominal  GI exam deferred       Other Findings            Anesthetic Plan    ASA: 2  Anesthesia type: MAC            Anesthetic plan and risks discussed with: Patient

## 2020-06-02 NOTE — PROGRESS NOTES
CRE balloon dilatation of the esophagus   18 mm Balloon inflated to 3 ATMs and held for 11 seconds. 19 mm Balloon inflated to 4.5 ATMs and held for 10 seconds. 20 mm Balloon inflated to 6 ATMs and held for 30 seconds. No subcutaneous crepitus of the chest or cervical region was noted post dilatation.

## 2020-06-02 NOTE — H&P
Telemedicine Visit     --------------------------------------------------------------------------------  Patient Name: Magali Palomo   Account #: [id number]    Gender: Female    (age): 1950 (78)       Provider:     Dm Nolasco PA-C      Staff:        Date: 2020 9:00 AM     Communication Technology Used:   video communication   ? video communication       Referring Physician:     Lillian Schmitz MD  [facility] [address] [facility], [address] [place] [address]  [phone] (phone)  [phone] (fax)        Chief Complaint: Change in bowel habits, blood in stool           History of Present Illness:   Called in March with concern over diverticulitis. She was given a course of ABX (thinks cipro and flagyl) over the phone given COvID 19 and was not evaluated in the office. No imaging done but symptoms consistent with prior episodes. She did have EGD/colon and pill cam in 2018 due to ongoing anemia, requiring IV and oral iron. 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 EGD WNL. Follow up pill cam normal.nbsp; Today she tells me since her diverticulitis flare in March her bowels have fluctuated. She goes several days in between BMs and then will eventually have to take stool softeners and a suppository to get things going. Will initially have a solid BM after these agents, followed by diarrhea. She then gets back into the pattern of constipation and has to revert back to taking meds to stimulate BM. This is unusual for her and she usually has a BM daily. She admits to a change in diet due to inability to locate some of the items she relies on to keep her regular. Tuesday of this week however, she had several episodes of bloody/mucousy BMs after taking her usual concoction for constipation This scared her and so she called the office.  She also had one episode of vomiting which she attributes to a dose of miralax and a low grade fever of 100f. Has not had recurrence of either. She was able to have a spontaneous, normal   BM both yesterday and today. Denies abdominal pain or rectal pain. No weight loss. Last Hgb in Nov and thinks it was on the downward trend, but doesn't know what it was. She has not had it checked since then. She has never had any obvious or overt bleeding associated with diverticula or AVM before. ? Called in March with concern over diverticulitis. She was given a course of ABX (thinks cipro and flagyl) over the phone given COvID 19 and was not evaluated in the office. No imaging done but symptoms consistent with prior episodes. She did have EGD/colon and pill cam in 2018 due to ongoing anemia, requiring IV and oral iron. 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 EGD WNL. Follow up pill cam normal.?nbsp; Today she tells me since her diverticulitis flare in March her bowels have fluctuated. She goes several days in between BMs and then will eventually have to take stool softeners and a suppository to get things going. Will initially have a solid BM after these agents, followed by diarrhea. She then gets back into the pattern of constipation and has to revert back to taking meds to stimulate BM. This is unusual for her and she usually has a BM daily. She admits to a change in diet due to inability to locate some of the items she relies on to keep her regular. Tuesday of this week however, she had several episodes of bloody/mucousy BMs after taking her usual concoction for constipation This scared her and so she called the office. She also had one episode of vomiting which she attributes to a dose of miralax and a low grade fever of 100f. Has not had recurrence of either. She was able to have a spontaneous, normal   BM both yesterday and today. Denies abdominal pain or rectal pain. No weight loss.   Last Hgb in Nov and thinks it was on the downward trend, but doesn't know what it was. She has not had it checked since then. She has never had any obvious or overt bleeding associated with diverticula or AVM before. Past Medical History      Medical Conditions: Arthritis  C.O.P.D. High blood pressure   Surgical Procedures: Hysterectomy   Dx Studies: Colonoscopy, 6/16/2016, Angioectasia in the cecum. (Injection, Additional Intervention) and Polyp (4 mm) in the ascending colon. (Polypectomy)  Colonoscopy, 4/9/2014, Severe diverticulosis of the sigmoid colon and Normal mucosa in the terminal ileum and whole colon  Colonoscopy  EGD, 6/16/2016, Normal mucosa in the duodenum. (Biopsy)  EGD, 4/9/2014, Hiatal Hernia in the cardia, Normal mucosa in the esophagus, Normal mucosa in the stomach. (MICHAEL-Test) and Normal mucosa in the duodenum  Endoscopy  M2A, 2014  Pillcam Intake, 5/31/2018  Pre-Procedure Call, 6/8/2016  Pre-Procedure Call, 3/28/2014   Medications: Klor-Con M20 20 mEq  levocetirizine 5 mg Take 1 tablet by mouth once a day  losartan 25 mg Take 1 tablet by mouth once a day  meloxicam 15 mg  montelukast 10 mg Take 1 tablet by mouth once daily  rosuvastatin 10 mg Take 1 tablet by mouth once a day  Toprol XL 25 mg Take 1 tablet by mouth once a day  triamterene-hydrochlorothiazid 37.5-25 mg Take 1 tablet by mouth once a day   Allergies: Patient has no known drug allergies  Unknown allergy medication - Nausea   Immunizations: Pneumococcal conjugate PCV 13, 11/1/19  Flu vaccine, 10/1/19      Social History      Alcohol: Wine. Tobacco: Former smokerCigarettes, quit 1988. Drugs: None   Exercise: Exercise less than 3 times a week. Caffeine: 3. Coffee or Tea # of cups per day: Mignon Vega    Marital Status:          Occupation:               Family History No history of Colon Polyps, Esophogeal Cancer, GI Cancers, IBD (Crohn's or UC), Liver disease  Grandfather: Diagnosed with Family history of colon cancer;       Review of Systems: Cardiovascular: Denies chest pain, irregular heart beat, palpitations, peripheral edema, syncope, Sweats. Constitutional: Denies fatigue, fever, loss of appetite, weight gain, weight loss. ENMT: Denies nose bleeds, sore throat, hearing loss. Endocrine: Denies excessive thirst, heat intolerance. Eyes: Denies loss of vision. Gastrointestinal: Presents suffers from change in bowel habits, constipation, diarrhea, stomach cramps, vomiting. Denies abdominal pain, abdominal swelling, Bloating/gas, heartburn, jaundice, nausea, rectal bleeding, dysphagia, rectal pain, Stool incontinence, hematemesis. Genitourinary: Denies dark urine, dysuria, frequent urination, hematuria, incontinence. Hematologic/Lymphatic: Denies easy bruising, prolonged bleeding. Integumentary: Denies itching, rashes, sun sensitivity. Musculoskeletal: Denies arthritis, back pain, gout, joint pain, muscle weakness, stiffness. Neurological: Denies dizziness, fainting, frequent headaches, memory loss. Psychiatric: Denies anxiety, depression, difficulty sleeping, hallucinations, nervousness, panic attacks, paranoia. Respiratory: Denies cough, dyspnea, wheezing. Vital Signs: reported by patient   BP  (mmHg)  Pulse  (ppm) Weight (lbs/oz) Height (ft/in) BMI   154/82 61 220 /  5 / 4 37.76      Physical Exam:   Physical exam was not performed. Lab Results: No Electronic Results      Impressions: Altered bowel function  Hematochezia  Anemia, unspecified  Angiodysplasia of colon without hemorrhage  Diverticulosis of large intestine without perforation or abscess without bleeding? ? Altered bowel function? ?  ? ? Hematochezia? ?  ? ? Anemia, unspecified? ?  ? ? Angiodysplasia of colon without hemorrhage? ?  ? ? Diverticulosis of large intestine without perforation or abscess without bleeding? Assessment: The etiology of her bowel habit change is unclear though could be due to changes in diet and activity while in quarantine. We will attempt to regular her bowels with additional fiber and a daily probiotic. She should increase her water intake as well. I have recommended suppository when needed or a gentle laxative. We will update labs and rule out infectious etiology given the report of fever. Check hgb and iron stores given hx of anemia and I will touch base with dr. Elizabeth Anthony whether she needs repeat colonoscopy. ? The etiology of her bowel habit change is unclear though could be due to changes in diet and activity while in quarantine. We will attempt to regular her bowels with additional fiber and a daily probiotic. She should increase her water intake as well. I have recommended suppository when needed or a gentle laxative. We will update labs and rule out infectious etiology given the report of fever. Check hgb and iron stores given hx of anemia and I will touch base with dr. Elizabeth Anthony whether she needs repeat colonoscopy. Plan: CBC w/ Diff w/ Platelet ([facility] #[id number])  Iron and Total Binding Capacity ([facility] #[id number])  FERRITIN  Comp Metabolic Panel ([facility] #[id number])  Clostridium Difficile Toxin ([facility] #[id number])  A Stool Culture ([facility] #[id number]) is ordered in view of the patient's complaints. ? ?CBC w/ Diff w/ Platelet ([facility] #[id number])? ?  ? ? Iron and Total Binding Capacity ([facility] #[id number])? ?  ? ?FERRITIN? ?  ? ? Comp Metabolic Panel ([facility] #[id number])? ?  ? ? Clostridium Difficile Toxin ([facility] #[id number])? ?  ?A ? Stool Culture ([facility] #[id number])? is ordered in view of the patient's complaints. Risk & Medical Necessity: The patient requires Moderate to High Severity care for this visit. Diagnosis and management options are Extensive. The amount of data reviewed and/or ordered is Minimal/None. The level of risk is Moderate. Duration of Video and/or Phone Call:   20minutes   ? 20minutes       Notes: Dr. Antionette Wilson  was available by phone for consultation during this visit.              Francia Bergeron LUIS     Electronically signed on 2020 9:41:41 AM by Francesco Solano PA-C                                 [address]  [facility], [place] [id number],  1950 Telemedicine Follow Up, Thursday, May 14, 2020                                                                                                                                                        New     Modify          Delete     Delete all     Edit Wording          Sign     [email or web address]

## 2020-06-02 NOTE — ROUTINE PROCESS
Helen Treadwell 1950 
[id number] Situation: 
Verbal report received from: Kayleen Louise RN Procedure: Procedure(s): 
COLONOSCOPY, EGD 
ESOPHAGOGASTRODUODENOSCOPY (EGD) ESOPHAGOGASTRODUODENAL (EGD) BIOPSY 
ESOPHAGEAL DILATION 
ENDOSCOPIC ARGON PLASMA COAGULATION 
ENDOSCOPIC POLYPECTOMY Background: 
 
Preoperative diagnosis: DIVERTICULITIS, ANEMIA Postoperative diagnosis: Esophageal Ring AVMs Sigmoid Colon Polyp X2 Diverticulosis :  Dr. Rafita Max Assistant(s): Endoscopy Technician-1: Sharon Henao Endoscopy RN-1: Radha James Specimens:  
ID Type Source Tests Collected by Time Destination 1 : duodenum biosy Preservative   Favio Ramirez MD 6/2/2020 1016 Pathology 2 : biopsy EG junction Presaudraative   Favio Ramirez MD 6/2/2020 1016 Pathology 3 : Mid esophagus biopsy Preservative   Favio Ramirez MD 6/2/2020 1017 Pathology 4 : Sigmoid Colon Polyp x2 Preservative   Favio Ramirez MD 6/2/2020 1050 Pathology H. Pylori  no Assessment: 
Intra-procedure medications Anesthesia gave intra-procedure sedation and medications, see anesthesia flow sheet yes Intravenous fluids: NS@ Juani Asai Vital signs stable Abdominal assessment: round and soft No crepitus felt around collarbones Recommendation: 
Discharge patient per MD order. Family or Friend Permission to share finding with family or friend yes Endoscopy discharge instructions have been reviewed and given to patient. The patient verbalized understanding and acceptance of instructions.

## 2020-10-26 ENCOUNTER — TELEPHONE (OUTPATIENT)
Dept: ONCOLOGY | Age: 70
End: 2020-10-26

## 2020-10-26 NOTE — TELEPHONE ENCOUNTER
[address] at [place] [address]  [phone]    10/26/20- Phone call placed to pt to remind pt to have labs drawn prior to her follow up appointment with . Pt verbalized understanding.

## 2020-10-27 LAB
ERYTHROCYTE [DISTWIDTH] IN BLOOD BY AUTOMATED COUNT: 16.3 % (ref 11.5–14.5)
FERRITIN SERPL-MCNC: 4 NG/ML (ref 26–388)
HCT VFR BLD AUTO: 33.4 % (ref 35–47)
HGB BLD-MCNC: 9.4 G/DL (ref 11.5–16)
IRON SATN MFR SERPL: 7 % (ref 20–50)
IRON SERPL-MCNC: 32 UG/DL (ref 35–150)
MCH RBC QN AUTO: 20.8 PG (ref 26–34)
MCHC RBC AUTO-ENTMCNC: 28.1 G/DL (ref 30–36.5)
MCV RBC AUTO: 74.1 FL (ref 80–99)
NRBC # BLD: 0 K/UL (ref 0–0.01)
NRBC BLD-RTO: 0 PER 100 WBC
PLATELET # BLD AUTO: 224 K/UL (ref 150–400)
PMV BLD AUTO: 10.5 FL (ref 8.9–12.9)
RBC # BLD AUTO: 4.51 M/UL (ref 3.8–5.2)
TIBC SERPL-MCNC: 434 UG/DL (ref 250–450)
WBC # BLD AUTO: 7.4 K/UL (ref 3.6–11)

## 2020-11-01 NOTE — PROGRESS NOTES
[facility] [place] [facility] [place] [address] [facility] [address] Eliane Watson Son: [phone]  F: [phone]      Reason for Visit:   Madison Gamboa is a 78 y.o. female who is seen by synchronous (real-time) audio-video technology for follow up of iron deficiency anemia. History of Present Illness:   She reports energy has been low recently, gets tired very easily. She denies visible bleeding, though she did have an episode of hematochezia this summer which prompted repeat EGD and colonoscopy with Dr. Edna Melton. She continues to focus on iron-rich diet. Can't tolerate oral iron. Mild chronic dyspnea unchanged. PAST HISTORY: The following sections were reviewed and updated in the EMR as appropriate: PMH, SH, FH, Medications, Allergies. Allergies   Allergen Reactions    Loratadine Nausea Only and Other (comments)     Muscle weakness      Review of Systems: A complete review of systems was obtained, reviewed, and scanned into the EMR. Pertinent findings reviewed above. Physical Exam:     Visit Vitals  LMP 01/01/1996     General: alert, cooperative, no distress   Mental  status: normal mood, behavior, speech, dress, motor activity, and thought processes, able to follow commands   HENT: NCAT   Neck: no visualized mass   Resp: no respiratory distress   Neuro: no gross deficits   Skin: no discoloration or lesions of concern on visible areas   Psychiatric: normal affect, consistent with stated mood, no evidence of hallucinations       Due to this being a TeleHealth evaluation (During [id number] public health emergency), many elements of the physical examination are unable to be assessed. Evaluation of the following organ systems was limited: Vitals/Constitutional/EENT/Resp/CV/GI//MS/Neuro/Skin/Heme-Lymph-Imm.         Results:     Lab Results   Component Value Date/Time    WBC 7.4 10/27/2020 11:04 AM    HGB 9.4 (L) 10/27/2020 11:04 AM    HCT 33.4 (L) 10/27/2020 11:04 AM PLATELET 206 43/55/4154 11:04 AM    MCV 74.1 (L) 10/27/2020 11:04 AM    ABS. NEUTROPHILS 6.5 02/07/2020 12:51 PM    Hemoglobin (POC) 8.8 02/20/2014 11:34 AM    Hemoglobin (POC) 10.2 (L) 10/15/2011 03:12 PM    Hematocrit (POC) 30 (L) 10/15/2011 03:12 PM     Lab Results   Component Value Date/Time    Sodium 142 02/07/2020 12:51 PM    Potassium 3.7 02/07/2020 12:51 PM    Chloride 109 (H) 02/07/2020 12:51 PM    CO2 29 02/07/2020 12:51 PM    Glucose 93 02/07/2020 12:51 PM    BUN 14 02/07/2020 12:51 PM    Creatinine 0.93 02/07/2020 12:51 PM    GFR est AA >60 02/07/2020 12:51 PM    GFR est non-AA >60 02/07/2020 12:51 PM    Calcium 8.7 02/07/2020 12:51 PM    Sodium (POC) 141 10/15/2011 03:12 PM    Potassium (POC) 3.5 10/15/2011 03:12 PM    Chloride (POC) 104 10/15/2011 03:12 PM    Glucose (POC) 83 07/26/2016 12:03 PM    Glucose POC 90 12/05/2016 11:08 AM    BUN (POC) 10 10/15/2011 03:12 PM    Creatinine (POC) 1.1 10/15/2011 03:12 PM    Calcium, ionized (POC) 1.11 (L) 10/15/2011 03:12 PM     Lab Results   Component Value Date/Time    Bilirubin, total 0.5 08/07/2019 11:03 AM    Alk.  phosphatase 70 08/07/2019 11:03 AM    Protein, total 6.8 08/07/2019 11:03 AM    Albumin 4.4 08/07/2019 11:03 AM    Globulin 3.7 07/26/2016 12:36 PM    A-G Ratio 1.8 08/07/2019 11:03 AM       Lab Results   Component Value Date/Time    Reticulocyte count 1.9 02/20/2014 10:50 AM    Iron % saturation 7 (L) 10/27/2020 11:04 AM    TIBC 434 10/27/2020 11:04 AM    Ferritin 4 (L) 10/27/2020 11:04 AM    Vitamin B12 380 08/21/2012 09:45 AM    Folate 15.5 08/21/2012 09:45 AM    Haptoglobin 101 02/20/2014 10:50 AM     (H) 12/08/2011 02:38 PM    Sed rate (ESR) 5 05/05/2014 10:40 AM    TSH 3.090 09/25/2018 09:50 AM    INR 1.0 10/15/2011 03:07 PM    INR (POC) 1.0 07/26/2016 12:07 PM    Lipase 70 (L) 05/10/2012 12:30 PM     HGB (g/dL)   Date Value   10/27/2020 9.4 (L)   02/07/2020 11.4 (L)   10/25/2019 13.0   10/26/2018 15.1   09/25/2018 16.2 (H) 04/25/2018 15.6   01/16/2018 11.1   07/17/2017 12.3   04/17/2017 13.8   01/05/2017 15.1   07/27/2016 13.0   07/26/2016 13.9   07/15/2016 13.7   05/03/2016 9.0 (L)   12/07/2015 12.3   06/03/2015 14.4   03/17/2015 14.9   12/08/2014 16.4 (H)   06/11/2014 15.5   05/05/2014 14.0   04/11/2014 13.4   03/05/2014 9.2 (L)   02/18/2014 8.5 (L)   04/09/2013 12.8   07/16/2012 11.6   05/11/2012 11.9   05/10/2012 13.2   01/19/2012 14.0   12/08/2011 13.6   10/15/2011 8.7 (L)   09/29/2011 8.5 (L)       Ferritin   Date Value   10/27/2020 4 NG/ML (L)   10/25/2019 12 ng/mL (L)   10/26/2018 29 ng/mL   04/25/2018 72 ng/mL   01/16/2018 8 ng/mL (L)   07/17/2017 12 ng/mL (L)   01/05/2017 24 ng/mL   07/15/2016 18 ng/mL   05/03/2016 4 ng/mL (L)   12/07/2015 10 ng/mL (L)   06/03/2015 17 ng/mL   12/08/2014 79 ng/mL   06/11/2014 40 NG/ML   04/11/2014 19 NG/ML   02/20/2014 5 ng/mL (L)   10/07/2011 5 ng/mL (L)       Stool Blood 2/24/2014: negative  Celiac panel 12/8/2014: negative    EGD and Colonscopy with Dr. Rody Anthony 4/9/2014: severe diverticulosis, nonbleeding  Capsule study 4/30/2014: normal    EGD and Colonoscopy with Dr. Geraldo Bobo 6/16/2016: large cecum AVM, one polyp (tubular adenoma), otherwise normal.  Capsule endoscopy 6/30/2016: normal    EGD and Colonoscopy with Dr. Geraldo Bobo 5/22/2018: Normal esophagus, stomach, duodenum, jejunum. AVM in colon treated. EGD and Colonoscopy with Dr. Geraldo Bobo 6/2/2020:  EGD:  Esophageal ring, acquired. Probably related to GERD. Colonoscopy: Colon polyps, diverticulosis and right colon AVM. Assessment:   1) Anemia, Iron Deficiency  Recurrent. S/p feraheme in 5/2016 and 7/2016, and injectafer 2/2018. Anemia initially resolved, but it has now recurred. She cannot tolerate oral iron. I recommend additional IV iron. The source of her iron deficiency is likely recurrent AVMs. Celiac panel was negative, and no reason to suggest another malabsorption issue. No evidence of bleeding from elsewhere.   She is at risk for recurrence in the future, so we will monitor. 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 She is at risk for recurrence. Monitor. 3) Fatigue  Recurred now with recurrence of her anemia. Monitor for improvement with IV iron. 4) COPD  Follows with pulmonary    Plan:     Injectafer 750mg IV weekly for 2 doses  Labs in 3 months: CBC, iron profile, ferritin [employer])  Return to see me in 3 months    [id number] Neha Sargent for video visits. Signed By: Kristine Nguyen MD      I was in the office while conducting this encounter. The patient was at her home. Consent:  She and/or her healthcare decision maker is aware that this patient-initiated Telehealth encounter is a billable service, with coverage as determined by her insurance carrier. She is aware that she may receive a bill and has provided verbal consent to proceed: Yes    Pursuant to the emergency declaration under the [address] and the [facility], 1135 waiver authority and the [employer] Act, this Virtual  Visit was conducted, with patient's (and/or legal guardian's) consent, to reduce the patient's risk of exposure to COVID-19 and provide necessary medical care. Services were provided through a video synchronous discussion virtually to substitute for in-person visit.

## 2020-11-03 ENCOUNTER — VIRTUAL VISIT (OUTPATIENT)
Dept: ONCOLOGY | Age: 70
End: 2020-11-03
Payer: MEDICARE

## 2020-11-03 DIAGNOSIS — D50.0 IRON DEFICIENCY ANEMIA DUE TO CHRONIC BLOOD LOSS: Primary | ICD-10-CM

## 2020-11-03 PROCEDURE — 1090F PRES/ABSN URINE INCON ASSESS: CPT | Performed by: INTERNAL MEDICINE

## 2020-11-03 PROCEDURE — 3017F COLORECTAL CA SCREEN DOC REV: CPT | Performed by: INTERNAL MEDICINE

## 2020-11-03 PROCEDURE — G8427 DOCREV CUR MEDS BY ELIG CLIN: HCPCS | Performed by: INTERNAL MEDICINE

## 2020-11-03 PROCEDURE — 1101F PT FALLS ASSESS-DOCD LE1/YR: CPT | Performed by: INTERNAL MEDICINE

## 2020-11-03 PROCEDURE — G0463 HOSPITAL OUTPT CLINIC VISIT: HCPCS | Performed by: INTERNAL MEDICINE

## 2020-11-03 PROCEDURE — G8400 PT W/DXA NO RESULTS DOC: HCPCS | Performed by: INTERNAL MEDICINE

## 2020-11-03 PROCEDURE — 99214 OFFICE O/P EST MOD 30 MIN: CPT | Performed by: INTERNAL MEDICINE

## 2020-11-03 PROCEDURE — G8417 CALC BMI ABV UP PARAM F/U: HCPCS | Performed by: INTERNAL MEDICINE

## 2020-11-03 PROCEDURE — G9899 SCRN MAM PERF RSLTS DOC: HCPCS | Performed by: INTERNAL MEDICINE

## 2020-11-03 PROCEDURE — G8536 NO DOC ELDER MAL SCRN: HCPCS | Performed by: INTERNAL MEDICINE

## 2020-11-03 PROCEDURE — G8756 NO BP MEASURE DOC: HCPCS | Performed by: INTERNAL MEDICINE

## 2020-11-03 PROCEDURE — G8432 DEP SCR NOT DOC, RNG: HCPCS | Performed by: INTERNAL MEDICINE

## 2020-11-03 RX ORDER — EPINEPHRINE 1 MG/ML
0.3 INJECTION, SOLUTION, CONCENTRATE INTRAVENOUS AS NEEDED
Status: CANCELLED | OUTPATIENT
Start: 2020-11-13

## 2020-11-03 RX ORDER — ACETAMINOPHEN 325 MG/1
650 TABLET ORAL AS NEEDED
Status: CANCELLED
Start: 2020-11-13

## 2020-11-03 RX ORDER — HYDROCORTISONE SODIUM SUCCINATE 100 MG/2ML
100 INJECTION, POWDER, FOR SOLUTION INTRAMUSCULAR; INTRAVENOUS AS NEEDED
Status: CANCELLED | OUTPATIENT
Start: 2020-11-13

## 2020-11-03 RX ORDER — ONDANSETRON 2 MG/ML
8 INJECTION INTRAMUSCULAR; INTRAVENOUS AS NEEDED
Status: CANCELLED | OUTPATIENT
Start: 2020-11-13

## 2020-11-03 RX ORDER — SODIUM CHLORIDE 0.9 % (FLUSH) 0.9 %
10 SYRINGE (ML) INJECTION AS NEEDED
Status: CANCELLED | OUTPATIENT
Start: 2020-11-06

## 2020-11-03 RX ORDER — HEPARIN 100 UNIT/ML
300-500 SYRINGE INTRAVENOUS AS NEEDED
Status: CANCELLED
Start: 2020-11-13

## 2020-11-03 RX ORDER — ALBUTEROL SULFATE 0.83 MG/ML
2.5 SOLUTION RESPIRATORY (INHALATION) AS NEEDED
Status: CANCELLED
Start: 2020-11-06

## 2020-11-03 RX ORDER — EPINEPHRINE 1 MG/ML
0.3 INJECTION, SOLUTION, CONCENTRATE INTRAVENOUS AS NEEDED
Status: CANCELLED | OUTPATIENT
Start: 2020-11-06

## 2020-11-03 RX ORDER — DIPHENHYDRAMINE HYDROCHLORIDE 50 MG/ML
25 INJECTION, SOLUTION INTRAMUSCULAR; INTRAVENOUS AS NEEDED
Status: CANCELLED
Start: 2020-11-06

## 2020-11-03 RX ORDER — ALBUTEROL SULFATE 0.83 MG/ML
2.5 SOLUTION RESPIRATORY (INHALATION) AS NEEDED
Status: CANCELLED
Start: 2020-11-13

## 2020-11-03 RX ORDER — DIPHENHYDRAMINE HYDROCHLORIDE 50 MG/ML
25 INJECTION, SOLUTION INTRAMUSCULAR; INTRAVENOUS AS NEEDED
Status: CANCELLED
Start: 2020-11-13

## 2020-11-03 RX ORDER — SODIUM CHLORIDE 9 MG/ML
10 INJECTION INTRAMUSCULAR; INTRAVENOUS; SUBCUTANEOUS AS NEEDED
Status: CANCELLED | OUTPATIENT
Start: 2020-11-06

## 2020-11-03 RX ORDER — DIPHENHYDRAMINE HYDROCHLORIDE 50 MG/ML
50 INJECTION, SOLUTION INTRAMUSCULAR; INTRAVENOUS AS NEEDED
Status: CANCELLED
Start: 2020-11-13

## 2020-11-03 RX ORDER — SODIUM CHLORIDE 0.9 % (FLUSH) 0.9 %
10 SYRINGE (ML) INJECTION AS NEEDED
Status: CANCELLED | OUTPATIENT
Start: 2020-11-13

## 2020-11-03 RX ORDER — ONDANSETRON 2 MG/ML
8 INJECTION INTRAMUSCULAR; INTRAVENOUS AS NEEDED
Status: CANCELLED | OUTPATIENT
Start: 2020-11-06

## 2020-11-03 RX ORDER — DIPHENHYDRAMINE HYDROCHLORIDE 50 MG/ML
50 INJECTION, SOLUTION INTRAMUSCULAR; INTRAVENOUS AS NEEDED
Status: CANCELLED
Start: 2020-11-06

## 2020-11-03 RX ORDER — HYDROCORTISONE SODIUM SUCCINATE 100 MG/2ML
100 INJECTION, POWDER, FOR SOLUTION INTRAMUSCULAR; INTRAVENOUS AS NEEDED
Status: CANCELLED | OUTPATIENT
Start: 2020-11-06

## 2020-11-03 RX ORDER — SODIUM CHLORIDE 9 MG/ML
10 INJECTION INTRAMUSCULAR; INTRAVENOUS; SUBCUTANEOUS AS NEEDED
Status: CANCELLED | OUTPATIENT
Start: 2020-11-13

## 2020-11-03 RX ORDER — ACETAMINOPHEN 325 MG/1
650 TABLET ORAL AS NEEDED
Status: CANCELLED
Start: 2020-11-06

## 2020-11-03 RX ORDER — HEPARIN 100 UNIT/ML
300-500 SYRINGE INTRAVENOUS AS NEEDED
Status: CANCELLED
Start: 2020-11-06

## 2020-11-03 NOTE — PATIENT INSTRUCTIONS
Thank you for participating in the virtual visit with Dr. Robert Chu. We will call you soon to schedule a follow up appointment with us in 3 months. If you do not hear from us, please call us at [phone] to schedule your appointment. Please use the enclosed lab slip to have your labs done before your next appointment.

## 2020-11-06 ENCOUNTER — HOSPITAL ENCOUNTER (OUTPATIENT)
Dept: INFUSION THERAPY | Age: 70
Discharge: HOME | End: 2020-11-06
Payer: MEDICARE

## 2020-11-06 VITALS
BODY MASS INDEX: 37.03 KG/M2 | SYSTOLIC BLOOD PRESSURE: 109 MMHG | OXYGEN SATURATION: 97 % | HEART RATE: 61 BPM | WEIGHT: 216.9 LBS | HEIGHT: 64 IN | RESPIRATION RATE: 18 BRPM | TEMPERATURE: 97.4 F | DIASTOLIC BLOOD PRESSURE: 54 MMHG

## 2020-11-06 DIAGNOSIS — D50.0 IRON DEFICIENCY ANEMIA DUE TO CHRONIC BLOOD LOSS: Primary | ICD-10-CM

## 2020-11-06 LAB
FERRITIN SERPL-MCNC: 4 NG/ML (ref 26–388)
HGB BLD-MCNC: 9.3 G/DL (ref 11.5–16)
IRON SERPL-MCNC: 28 UG/DL (ref 35–150)

## 2020-11-06 PROCEDURE — 83540 ASSAY OF IRON: CPT

## 2020-11-06 PROCEDURE — 85018 HEMOGLOBIN: CPT

## 2020-11-06 PROCEDURE — 36415 COLL VENOUS BLD VENIPUNCTURE: CPT

## 2020-11-06 PROCEDURE — 96365 THER/PROPH/DIAG IV INF INIT: CPT

## 2020-11-06 PROCEDURE — 82728 ASSAY OF FERRITIN: CPT

## 2020-11-06 PROCEDURE — 74011250636 HC RX REV CODE- 250/636: Performed by: INTERNAL MEDICINE

## 2020-11-06 RX ORDER — HEPARIN 100 UNIT/ML
300-500 SYRINGE INTRAVENOUS AS NEEDED
Status: DISCONTINUED | OUTPATIENT
Start: 2020-11-06 | End: 2020-11-07 | Stop reason: HOSPADM

## 2020-11-06 RX ORDER — SODIUM CHLORIDE 9 MG/ML
10 INJECTION INTRAMUSCULAR; INTRAVENOUS; SUBCUTANEOUS AS NEEDED
Status: DISCONTINUED | OUTPATIENT
Start: 2020-11-06 | End: 2020-11-07 | Stop reason: HOSPADM

## 2020-11-06 RX ORDER — SODIUM CHLORIDE 0.9 % (FLUSH) 0.9 %
10 SYRINGE (ML) INJECTION AS NEEDED
Status: DISCONTINUED | OUTPATIENT
Start: 2020-11-06 | End: 2020-11-07 | Stop reason: HOSPADM

## 2020-11-06 RX ADMIN — SODIUM CHLORIDE 500 ML: 900 INJECTION, SOLUTION INTRAVENOUS at 12:47

## 2020-11-06 RX ADMIN — FERRIC CARBOXYMALTOSE INJECTION 750 MG: 50 INJECTION, SOLUTION INTRAVENOUS at 13:05

## 2020-11-06 NOTE — PROGRESS NOTES
Outpatient Infusion Center Short Visit Progress Note    [id number] Patient admitted to [facility] for Injectafer infusion ambulatory in stable condition. Assessment completed. No new concerns voiced. Vital Signs:  Visit Vitals  BP (!) 109/54   Pulse 61   Temp 97.4 °F (36.3 °C)   Resp 18   Ht 5' 4\" (1.626 m)   Wt 98.4 kg (216 lb 14.4 oz)   LMP 01/01/1996   SpO2 97%   Breastfeeding No   BMI 37.23 kg/m²         Peripheral IV (24g) inserted into right hand with positive blood return. Lab Results:  Recent Results (from the past 12 hour(s))   HEMOGLOBIN    Collection Time: 11/06/20 12:28 PM   Result Value Ref Range    HGB 9.3 (L) 11.5 - 16.0 g/dL       Medications:  Medications Administered     ferric carboxymaltose (INJECTAFER) 750 mg in 0.9% sodium chloride 250 mL, overfill volume 25 mL IVPB     Admin Date  11/06/2020 Action  Given Dose  750 mg Rate  870 mL/hr Route  IntraVENous Administered By  Washington Deleon RN          sodium chloride 0.9 % bolus infusion 500 mL     Admin Date  11/06/2020 Action  New Bag Dose  500 mL Route  IntraVENous Administered By  Washington Deleon, RICK              [id number]  Patient tolerated treatment well. Patient discharged from [place] [address] ambulatory in no distress. Patient aware of next appointment on 11/13/20.     Luzma Lewis RN    Future Appointments   Date Time Provider Garcia Milesisti   11/13/2020  1:00 PM SS INF3 CH4 <2H [facility] [place] [facility]   2/3/2021 10:30 AM James Azul MD [facility] BS AMB

## 2020-11-10 RX ORDER — PANTOPRAZOLE SODIUM 40 MG/1
40 TABLET, DELAYED RELEASE ORAL DAILY
Qty: 90 TAB | Refills: 1 | Status: SHIPPED | OUTPATIENT
Start: 2020-11-10 | End: 2021-05-03 | Stop reason: SDUPTHER

## 2020-11-10 NOTE — TELEPHONE ENCOUNTER
Patient is calling again insisting on her script for Protonix be sent over to pharmacy on file. Please see previous notes.  Patient's phone: [phone]

## 2020-11-13 ENCOUNTER — HOSPITAL ENCOUNTER (OUTPATIENT)
Dept: INFUSION THERAPY | Age: 70
Discharge: HOME | End: 2020-11-13
Payer: MEDICARE

## 2020-11-13 VITALS
DIASTOLIC BLOOD PRESSURE: 66 MMHG | OXYGEN SATURATION: 96 % | HEART RATE: 62 BPM | TEMPERATURE: 99.4 F | SYSTOLIC BLOOD PRESSURE: 117 MMHG | RESPIRATION RATE: 18 BRPM

## 2020-11-13 DIAGNOSIS — D50.0 IRON DEFICIENCY ANEMIA DUE TO CHRONIC BLOOD LOSS: Primary | ICD-10-CM

## 2020-11-13 PROCEDURE — 74011250636 HC RX REV CODE- 250/636: Performed by: INTERNAL MEDICINE

## 2020-11-13 PROCEDURE — 96361 HYDRATE IV INFUSION ADD-ON: CPT

## 2020-11-13 PROCEDURE — 77030016057 HC NDL HUBR APOL -B

## 2020-11-13 PROCEDURE — 96365 THER/PROPH/DIAG IV INF INIT: CPT

## 2020-11-13 RX ORDER — HEPARIN 100 UNIT/ML
300-500 SYRINGE INTRAVENOUS AS NEEDED
Status: DISCONTINUED | OUTPATIENT
Start: 2020-11-13 | End: 2020-11-14 | Stop reason: HOSPADM

## 2020-11-13 RX ORDER — SODIUM CHLORIDE 0.9 % (FLUSH) 0.9 %
10 SYRINGE (ML) INJECTION AS NEEDED
Status: DISCONTINUED | OUTPATIENT
Start: 2020-11-13 | End: 2020-11-14 | Stop reason: HOSPADM

## 2020-11-13 RX ORDER — SODIUM CHLORIDE 9 MG/ML
10 INJECTION INTRAMUSCULAR; INTRAVENOUS; SUBCUTANEOUS AS NEEDED
Status: DISCONTINUED | OUTPATIENT
Start: 2020-11-13 | End: 2020-11-14 | Stop reason: HOSPADM

## 2020-11-13 RX ADMIN — FERRIC CARBOXYMALTOSE INJECTION 750 MG: 50 INJECTION, SOLUTION INTRAVENOUS at 14:18

## 2020-11-13 RX ADMIN — SODIUM CHLORIDE 500 ML: 900 INJECTION, SOLUTION INTRAVENOUS at 13:58

## 2021-01-25 ENCOUNTER — TELEPHONE (OUTPATIENT)
Dept: ONCOLOGY | Age: 71
End: 2021-01-25

## 2021-01-25 NOTE — TELEPHONE ENCOUNTER
[address] at [place] [address]  [phone]    01/25/21- Phone call placed to pt to remind pt to have labs drawn prior to her follow up appointment with . Pt verbalized understanding.

## 2021-01-28 NOTE — PROGRESS NOTES
[facility] [place] [facility] [place] [address]  [address] [facility], [address] [place] [address]  [facility] [phone]  F: [phone]      Reason for Visit:   Pancho Huntley is a 78 y.o. female who is seen for follow up of iron deficiency anemia. History of Present Illness:   She received additional injectafer x2 doses in 11/2020 and tolerated this well. She reports improved energy for awhile, but she has noticed the fatigue starting to return in the past few weeks. Dizziness persists, didn't change with the IV iron. No dyspnea. No blood in stool. Trying to get iron in her diet, but can't tolerate oral iron. Review of systems was obtained and pertinent findings reviewed above. Past medical history, social history, family history, medications, and allergies are located in the electronic medical record. Physical Exam:     Visit Vitals  /72 (BP 1 Location: Left upper arm, BP Patient Position: Sitting, BP Cuff Size: Large adult)   Pulse 64   Temp 97.9 °F (36.6 °C) (Oral)   Resp 16   Ht 5' 4\" (1.626 m)   Wt 221 lb (100.2 kg)   LMP 01/01/1996   SpO2 93%   BMI 37.93 kg/m²     General: no distress  Respiratory: normal respiratory effort  CV: no peripheral edema  Skin: no rashes; no ecchymoses; no petechiae      Results:     Lab Results   Component Value Date/Time    WBC 10.5 01/26/2021 03:18 PM    HGB 15.4 01/26/2021 03:18 PM    HCT 44.7 01/26/2021 03:18 PM    PLATELET 926 04/71/5522 03:18 PM    MCV 84.7 01/26/2021 03:18 PM    ABS.  NEUTROPHILS 6.5 02/07/2020 12:51 PM    Hemoglobin (POC) 8.8 02/20/2014 11:34 AM    Hemoglobin (POC) 10.2 (L) 10/15/2011 03:12 PM    Hematocrit (POC) 30 (L) 10/15/2011 03:12 PM     Lab Results   Component Value Date/Time    Sodium 142 02/07/2020 12:51 PM    Potassium 3.7 02/07/2020 12:51 PM    Chloride 109 (H) 02/07/2020 12:51 PM    CO2 29 02/07/2020 12:51 PM    Glucose 93 02/07/2020 12:51 PM    BUN 14 02/07/2020 12:51 PM    Creatinine 0.93 02/07/2020 12:51 PM GFR est AA >60 02/07/2020 12:51 PM    GFR est non-AA >60 02/07/2020 12:51 PM    Calcium 8.7 02/07/2020 12:51 PM    Sodium (POC) 141 10/15/2011 03:12 PM    Potassium (POC) 3.5 10/15/2011 03:12 PM    Chloride (POC) 104 10/15/2011 03:12 PM    Glucose (POC) 83 07/26/2016 12:03 PM    Glucose POC 90 12/05/2016 11:08 AM    BUN (POC) 10 10/15/2011 03:12 PM    Creatinine (POC) 1.1 10/15/2011 03:12 PM    Calcium, ionized (POC) 1.11 (L) 10/15/2011 03:12 PM     Lab Results   Component Value Date/Time    Bilirubin, total 0.5 08/07/2019 11:03 AM    Alk. phosphatase 70 08/07/2019 11:03 AM    Protein, total 6.8 08/07/2019 11:03 AM    Albumin 4.4 08/07/2019 11:03 AM    Globulin 3.7 07/26/2016 12:36 PM    A-G Ratio 1.8 08/07/2019 11:03 AM       Lab Results   Component Value Date/Time    Reticulocyte count 1.9 02/20/2014 10:50 AM    Iron % saturation 21 01/26/2021 03:18 PM    TIBC 345 01/26/2021 03:18 PM    Ferritin 25 (L) 01/26/2021 03:18 PM    Vitamin B12 380 08/21/2012 09:45 AM    Folate 15.5 08/21/2012 09:45 AM    Haptoglobin 101 02/20/2014 10:50 AM     (H) 12/08/2011 02:38 PM    Sed rate (ESR) 5 05/05/2014 10:40 AM    TSH 3.090 09/25/2018 09:50 AM    INR 1.0 10/15/2011 03:07 PM    INR (POC) 1.0 07/26/2016 12:07 PM    Lipase 70 (L) 05/10/2012 12:30 PM     HGB (g/dL)   Date Value   01/26/2021 15.4   11/06/2020 9.3 (L)   10/27/2020 9.4 (L)   02/07/2020 11.4 (L)   10/25/2019 13.0   10/26/2018 15.1   09/25/2018 16.2 (H)   04/25/2018 15.6   01/16/2018 11.1   07/17/2017 12.3   04/17/2017 13.8   01/05/2017 15.1   07/27/2016 13.0   07/26/2016 13.9   07/15/2016 13.7   05/03/2016 9.0 (L)   12/07/2015 12.3   06/03/2015 14.4   03/17/2015 14.9   12/08/2014 16.4 (H)   06/11/2014 15.5   05/05/2014 14.0   04/11/2014 13.4   03/05/2014 9.2 (L)   02/18/2014 8.5 (L)   04/09/2013 12.8   07/16/2012 11.6   05/11/2012 11.9   05/10/2012 13.2   01/19/2012 14.0   12/08/2011 13.6   10/15/2011 8.7 (L)   09/29/2011 8.5 (L)       Ferritin   Date Value  01/26/2021 25 NG/ML (L)   11/06/2020 4 NG/ML (L)   10/27/2020 4 NG/ML (L)   10/25/2019 12 ng/mL (L)   10/26/2018 29 ng/mL   04/25/2018 72 ng/mL   01/16/2018 8 ng/mL (L)   07/17/2017 12 ng/mL (L)   01/05/2017 24 ng/mL   07/15/2016 18 ng/mL   05/03/2016 4 ng/mL (L)   12/07/2015 10 ng/mL (L)   06/03/2015 17 ng/mL   12/08/2014 79 ng/mL   06/11/2014 40 NG/ML   04/11/2014 19 NG/ML   02/20/2014 5 ng/mL (L)   10/07/2011 5 ng/mL (L)       Stool Blood 2/24/2014: negative  Celiac panel 12/8/2014: negative    EGD and Colonscopy with Dr. Edgardo Cavazos 4/9/2014: severe diverticulosis, nonbleeding  Capsule study 4/30/2014: normal    EGD and Colonoscopy with [facility]. [facility] 6/16/2016: large cecum AVM, one polyp (tubular adenoma), otherwise normal.  Capsule endoscopy 6/30/2016: normal    EGD and Colonoscopy with Dr. [facility] 5/22/2018: Normal esophagus, stomach, duodenum, jejunum. AVM in colon treated. EGD and Colonoscopy with Dr. [facility] 6/2/2020:  EGD:  Esophageal ring, acquired. Probably related to GERD. Colonoscopy: Colon polyps, diverticulosis and right colon AVM. Assessment:   1) Anemia, Iron Deficiency  Recurrent. S/p feraheme in 5/2016 and 7/2016, and injectafer in 2/2018 and 11/2020. Anemia resolves with IV iron, but subsequently recurs. She cannot tolerate oral iron. HGB is up to 15.4 after recent IV iron. Ferritin improved from 4 to 25, which is a somewhat disappointing rise. The source of her iron deficiency is likely recurrent AVMs. Celiac panel was negative, and no reason to suggest another malabsorption issue. No evidence of bleeding from elsewhere. She is at risk for recurrence in the future, so we will monitor. 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 She is at risk for recurrence. Monitor. 3) Fatigue  Persists despite improvement in anemia. Monitor. 4) COPD  Follows with pulmonary. Receiving an injection once a month which has helped her symptoms quite a bit.     Plan: Labs in 6 months: CBC, iron profile, ferritin ([facility])  Return to see me in 6 months    [id number] Neha Sargent for video visits.     Signed By: Osman Laboy MD

## 2021-02-03 ENCOUNTER — OFFICE VISIT (OUTPATIENT)
Dept: ONCOLOGY | Age: 71
End: 2021-02-03
Payer: MEDICARE

## 2021-02-03 VITALS
BODY MASS INDEX: 37.73 KG/M2 | WEIGHT: 221 LBS | TEMPERATURE: 97.9 F | SYSTOLIC BLOOD PRESSURE: 135 MMHG | DIASTOLIC BLOOD PRESSURE: 72 MMHG | RESPIRATION RATE: 16 BRPM | OXYGEN SATURATION: 93 % | HEIGHT: 64 IN | HEART RATE: 64 BPM

## 2021-02-03 DIAGNOSIS — D50.0 IRON DEFICIENCY ANEMIA DUE TO CHRONIC BLOOD LOSS: Primary | ICD-10-CM

## 2021-02-03 PROCEDURE — G9899 SCRN MAM PERF RSLTS DOC: HCPCS | Performed by: INTERNAL MEDICINE

## 2021-02-03 PROCEDURE — 1090F PRES/ABSN URINE INCON ASSESS: CPT | Performed by: INTERNAL MEDICINE

## 2021-02-03 PROCEDURE — G8752 SYS BP LESS 140: HCPCS | Performed by: INTERNAL MEDICINE

## 2021-02-03 PROCEDURE — 3017F COLORECTAL CA SCREEN DOC REV: CPT | Performed by: INTERNAL MEDICINE

## 2021-02-03 PROCEDURE — 1101F PT FALLS ASSESS-DOCD LE1/YR: CPT | Performed by: INTERNAL MEDICINE

## 2021-02-03 PROCEDURE — G8417 CALC BMI ABV UP PARAM F/U: HCPCS | Performed by: INTERNAL MEDICINE

## 2021-02-03 PROCEDURE — 99214 OFFICE O/P EST MOD 30 MIN: CPT | Performed by: INTERNAL MEDICINE

## 2021-02-03 PROCEDURE — G8432 DEP SCR NOT DOC, RNG: HCPCS | Performed by: INTERNAL MEDICINE

## 2021-02-03 PROCEDURE — G0463 HOSPITAL OUTPT CLINIC VISIT: HCPCS | Performed by: INTERNAL MEDICINE

## 2021-02-03 PROCEDURE — G8754 DIAS BP LESS 90: HCPCS | Performed by: INTERNAL MEDICINE

## 2021-02-03 PROCEDURE — G8427 DOCREV CUR MEDS BY ELIG CLIN: HCPCS | Performed by: INTERNAL MEDICINE

## 2021-02-03 PROCEDURE — G8536 NO DOC ELDER MAL SCRN: HCPCS | Performed by: INTERNAL MEDICINE

## 2021-02-03 PROCEDURE — G8400 PT W/DXA NO RESULTS DOC: HCPCS | Performed by: INTERNAL MEDICINE

## 2021-02-03 NOTE — PROGRESS NOTES
Anna Swan is a 78 y.o. female follow up for anemia. 1. Have you been to the ER, urgent care clinic since your last visit? Hospitalized since your last visit?no     2. Have you seen or consulted any other health care providers outside of the [address] [place] [address] since your last visit? Include any pap smears or colon screening.  no

## 2021-03-05 ENCOUNTER — OFFICE VISIT (OUTPATIENT)
Dept: FAMILY MEDICINE CLINIC | Age: 71
End: 2021-03-05
Payer: MEDICARE

## 2021-03-05 VITALS
BODY MASS INDEX: 37.76 KG/M2 | SYSTOLIC BLOOD PRESSURE: 119 MMHG | HEART RATE: 56 BPM | RESPIRATION RATE: 16 BRPM | HEIGHT: 64 IN | WEIGHT: 221.2 LBS | OXYGEN SATURATION: 97 % | TEMPERATURE: 97.7 F | DIASTOLIC BLOOD PRESSURE: 83 MMHG

## 2021-03-05 DIAGNOSIS — Z71.89 ADVANCED DIRECTIVES, COUNSELING/DISCUSSION: ICD-10-CM

## 2021-03-05 DIAGNOSIS — E78.00 HYPERCHOLESTEROLEMIA: ICD-10-CM

## 2021-03-05 DIAGNOSIS — J45.41 MODERATE PERSISTENT ASTHMA WITH ACUTE EXACERBATION: ICD-10-CM

## 2021-03-05 DIAGNOSIS — Z00.00 MEDICARE ANNUAL WELLNESS VISIT, SUBSEQUENT: Primary | ICD-10-CM

## 2021-03-05 DIAGNOSIS — Z78.0 POSTMENOPAUSAL STATE: ICD-10-CM

## 2021-03-05 DIAGNOSIS — J30.89 NON-SEASONAL ALLERGIC RHINITIS, UNSPECIFIED TRIGGER: ICD-10-CM

## 2021-03-05 DIAGNOSIS — I10 ESSENTIAL HYPERTENSION WITH GOAL BLOOD PRESSURE LESS THAN 130/80: ICD-10-CM

## 2021-03-05 DIAGNOSIS — Z12.31 ENCOUNTER FOR SCREENING MAMMOGRAM FOR MALIGNANT NEOPLASM OF BREAST: ICD-10-CM

## 2021-03-05 DIAGNOSIS — F41.1 GAD (GENERALIZED ANXIETY DISORDER): ICD-10-CM

## 2021-03-05 PROCEDURE — G0463 HOSPITAL OUTPT CLINIC VISIT: HCPCS | Performed by: NURSE PRACTITIONER

## 2021-03-05 PROCEDURE — 1090F PRES/ABSN URINE INCON ASSESS: CPT | Performed by: NURSE PRACTITIONER

## 2021-03-05 PROCEDURE — G0439 PPPS, SUBSEQ VISIT: HCPCS | Performed by: NURSE PRACTITIONER

## 2021-03-05 PROCEDURE — G8400 PT W/DXA NO RESULTS DOC: HCPCS | Performed by: NURSE PRACTITIONER

## 2021-03-05 PROCEDURE — G8536 NO DOC ELDER MAL SCRN: HCPCS | Performed by: NURSE PRACTITIONER

## 2021-03-05 PROCEDURE — 3017F COLORECTAL CA SCREEN DOC REV: CPT | Performed by: NURSE PRACTITIONER

## 2021-03-05 PROCEDURE — G8417 CALC BMI ABV UP PARAM F/U: HCPCS | Performed by: NURSE PRACTITIONER

## 2021-03-05 PROCEDURE — G8432 DEP SCR NOT DOC, RNG: HCPCS | Performed by: NURSE PRACTITIONER

## 2021-03-05 PROCEDURE — G8427 DOCREV CUR MEDS BY ELIG CLIN: HCPCS | Performed by: NURSE PRACTITIONER

## 2021-03-05 PROCEDURE — G9899 SCRN MAM PERF RSLTS DOC: HCPCS | Performed by: NURSE PRACTITIONER

## 2021-03-05 PROCEDURE — 99214 OFFICE O/P EST MOD 30 MIN: CPT | Performed by: NURSE PRACTITIONER

## 2021-03-05 PROCEDURE — G8754 DIAS BP LESS 90: HCPCS | Performed by: NURSE PRACTITIONER

## 2021-03-05 PROCEDURE — 1101F PT FALLS ASSESS-DOCD LE1/YR: CPT | Performed by: NURSE PRACTITIONER

## 2021-03-05 PROCEDURE — G8752 SYS BP LESS 140: HCPCS | Performed by: NURSE PRACTITIONER

## 2021-03-05 NOTE — PATIENT INSTRUCTIONS
Medicare Wellness Visit, Female The best way to live healthy is to have a lifestyle where you eat a well-balanced diet, exercise regularly, limit alcohol use, and quit all forms of tobacco/nicotine, if applicable. Regular preventive services are another way to keep healthy. Preventive services (vaccines, screening tests, monitoring & exams) can help personalize your care plan, which helps you manage your own care. Screening tests can find health problems at the earliest stages, when they are easiest to treat. [facility] follows the current, evidence-based guidelines published by the [facility] Omid Bryson ([facility]) when recommending preventive services for our patients. Because we follow these guidelines, sometimes recommendations change over time as research supports it. (For example, mammograms used to be recommended annually. Even though Medicare will still pay for an annual mammogram, the newer guidelines recommend a mammogram every two years for women of average risk). Of course, you and your doctor may decide to screen more often for some diseases, based on your risk and your co-morbidities (chronic disease you are already diagnosed with). Preventive services for you include: - Medicare offers their members a free annual wellness visit, which is time for you and your primary care provider to discuss and plan for your preventive service needs. Take advantage of this benefit every year! 
-All adults over the age of 72 should receive the recommended pneumonia vaccines. Current USPSTF guidelines recommend a series of two vaccines for the best pneumonia protection.  
-All adults should have a flu vaccine yearly and a tetanus vaccine every 10 years.  
-All adults age 48 and older should receive the shingles vaccines (series of two vaccines). -All adults age 38-68 who are overweight should have a diabetes screening test once every three years. -All adults born between [birth year] and 1963 should be screened once for Hepatitis C. 
-Other screening tests and preventive services for persons with diabetes include: an eye exam to screen for diabetic retinopathy, a kidney function test, a foot exam, and stricter control over your cholesterol.  
-Cardiovascular screening for adults with routine risk involves an electrocardiogram (ECG) at intervals determined by your doctor.  
-Colorectal cancer screenings should be done for adults age 54-65 with no increased risk factors for colorectal cancer. There are a number of acceptable methods of screening for this type of cancer. Each test has its own benefits and drawbacks. Discuss with your doctor what is most appropriate for you during your annual wellness visit. The different tests include: colonoscopy (considered the best screening method), a fecal occult blood test, a fecal DNA test, and sigmoidoscopy. 
 
-A bone mass density test is recommended when a woman turns 65 to screen for osteoporosis. This test is only recommended one time, as a screening. Some providers will use this same test as a disease monitoring tool if you already have osteoporosis. -Breast cancer screenings are recommended every other year for women of normal risk, age 54-69. 
-Cervical cancer screenings for women over age 72 are only recommended with certain risk factors. Here is a list of your current Health Maintenance items (your personalized list of preventive services) with a due date: 
Health Maintenance Due Topic Date Due  
 Hepatitis C Test  Never done  COVID-19 Vaccine (1 of 2) Never done  Shingles Vaccine (1 of 2) Never done  Glaucoma Screening   Never done  Bone Mineral Density   Never done  Cholesterol Test   08/07/2020  Mammogram  11/01/2020

## 2021-03-05 NOTE — ACP (ADVANCE CARE PLANNING)
Advance Care Planning     Advance Care Planning     Advance Care Planning (ACP) Provider Conversation Snapshot     Date of ACP Conversation: 03/05/21    Persons included in Conversation:  patient  Length of ACP Conversation in minutes:  <16 minutes (Non-Billable)     Authorized Decision Maker (if patient is incapable of making informed decisions): This person is:   Named in acp document                                                       For Patients with Decision Making Capacity:   Values/Goals: Exploration of values, goals, and preferences if recovery is not expected, even with continued medical treatment in the event of:  Imminent death  Severe, permanent brain injury  \"In these circumstances, what matters most to you? \"  Care focused more on comfort or quality of life. Conversation Outcomes / Follow-Up Plan:   Patient reports existing ACP document reflects current wishes. Recommended communicating the plan and making copies for the healthcare agent, personal physician, and others as appropriate (e.g., health system)  Recommended review of completed ACP document annually or upon change in health status      Requested copy of acp document for her chart.     Howard Liang NP

## 2021-03-05 NOTE — PROGRESS NOTES
This is the Subsequent Medicare Annual Wellness Exam, performed 12 months or more after the Initial AWV or the last Subsequent AWV    I have reviewed the patient's medical history in detail and updated the computerized patient record. Depression Risk Factor Screening:     3 most recent PHQ Screens 3/5/2021   Little interest or pleasure in doing things Not at all   Feeling down, depressed, irritable, or hopeless Not at all   Total Score PHQ 2 0       Alcohol Risk Screen    Do you average more than 1 drink per night or more than 7 drinks a week:  No    On any one occasion in the past three months have you have had more than 3 drinks containing alcohol:  No        Functional Ability and Level of Safety:    Hearing: Hearing is good. Activities of Daily Living: The home contains: handrails  Patient does total self care      Ambulation: with no difficulty     Fall Risk:  Fall Risk Assessment, last 12 mths 3/5/2021   Able to walk? Yes   Fall in past 12 months? 0   Do you feel unsteady? 0   Are you worried about falling 0   Number of falls in past 12 months -   Fall with injury? -      Abuse Screen:  Patient is not abused       Cognitive Screening    Has your family/caregiver stated any concerns about your memory: no     Cognitive Screening: normal          Patient Care Team   Patient Care Team:  Irving Palacios NP as PCP - General (Nurse Practitioner)  Irving Palacios NP as PCP - [facility]  Sujit Azul MD (Hematology and Oncology)  Sheila Arias MD (Gastroenterology)  Yehuda Nicole MD as Physician (Pulmonary Disease)    History     Patient Active Problem List   Diagnosis Code    GERD (gastroesophageal reflux disease) K21.9    Obesity (BMI 30-39. 9) E66.9    Essential hypertension with goal blood pressure less than 130/80 I10    Hypercholesterolemia E78.00    Iron deficiency anemia due to chronic blood loss D50.0    AVM (arteriovenous malformation) of colon with hemorrhage K55.21    Moderate persistent asthma J45.40    ANISA (generalized anxiety disorder) F41.1    Non-seasonal allergic rhinitis J30.89     Past Medical History:   Diagnosis Date    Anemia     Arthritis     knee right    Asthma     Bradycardia 7/26/2016    Chronic obstructive pulmonary disease (HCC)     GERD (gastroesophageal reflux disease)     Hypercholesterolemia     Hypertension     Obesity (BMI 30-39. 9)     Psychiatric disorder     depression/ not currently taking    Vertigo     Vestibulopathy 7/27/2016      Past Surgical History:   Procedure Laterality Date    COLONOSCOPY N/A 5/22/2018    COLONOSCOPY performed by Dash Purvis MD at [address] COLONOSCOPY N/A 6/2/2020    COLONOSCOPY performed by Eagle France MD at [address] HX COLONOSCOPY  10/25/2011    1 polyp which was removed, diverticulosis in distal descending colon & sigmoid colon.  HX ENDOSCOPY  04/2014    HX GYN  1996    hysterectomy    HX ORTHOPAEDIC Left     foot surgery    HX OTHER SURGICAL  2006? L Lower Abd. Basal Cell CA    HX TONSILLECTOMY  1958     Current Outpatient Medications   Medication Sig Dispense Refill    losartan (COZAAR) 25 mg tablet Take 1 Tab by mouth daily. VISIT REQUIRED PRIOR TO ADDITIONAL REFILLS 90 Tab 0    montelukast (SINGULAIR) 10 mg tablet Take 1 Tab by mouth daily. VISIT REQUIRED PRIOR TO ADDITIONAL REFILLS 90 Tab 0    triamterene-hydroCHLOROthiazide (MAXZIDE) 37.5-25 mg per tablet Take 1 Tab by mouth daily. VISIT REQUIRED PRIOR TO ADDITIONAL REFILLS 90 Tab 0    levocetirizine (XYZAL) 5 mg tablet Take 1 Tab by mouth daily. VISIT REQUIRED PRIOR TO ADDITIONAL REFILLS 90 Tab 0    pantoprazole (PROTONIX) 40 mg tablet Take 1 Tab by mouth daily. 90 Tab 1    PARoxetine (PAXIL) 20 mg tablet TAKE 1 TABLET BY MOUTH DAILY 90 Tab 3    rosuvastatin (CRESTOR) 10 mg tablet Take 1 Tab by mouth nightly.  90 Tab 3    budesonide/formoterol fumarate (SYMBICORT IN) Take  by inhalation.  potassium chloride (K-DUR, KLOR-CON) 20 mEq tablet Take one tablet daily 270 Tab 3    albuterol (PROVENTIL VENTOLIN) 2.5 mg /3 mL (0.083 %) nebulizer solution 3 mL by Nebulization route every four (4) hours as needed for Wheezing. 24 Each 1    Nebulizer & Compressor machine Use as directed 1 Each 0    ipratropium (ATROVENT) 0.02 % nebulizer solution 2.5 mL by Nebulization route as needed for Wheezing (every 6 hours as needed). 62.5 mL 0    albuterol (VENTOLIN HFA) 90 mcg/actuation inhaler Take 2 Puffs by inhalation every six (6) hours as needed for Wheezing or Shortness of Breath (may also use for cough). 1 Inhaler 3     Allergies   Allergen Reactions    Loratadine Nausea Only and Other (comments)     Muscle weakness       Family History   Problem Relation Age of Onset    Hypertension Mother     Cancer Father     Stroke Maternal Grandmother     Cancer Paternal Grandmother         breast cancer    Breast Cancer Paternal Grandmother     Cancer Maternal Grandfather      Social History     Tobacco Use    Smoking status: Former Smoker     Packs/day: 1.00     Years: 18.00     Pack years: 18.00     Types: Cigarettes     Quit date: 1988     Years since quittin.6    Smokeless tobacco: Never Used    Tobacco comment: Quit in    Substance Use Topics    Alcohol use: Yes     Comment: rarely       Assessment/Plan   Education and counseling provided:  Are appropriate based on today's review and evaluation  End-of-Life planning (with patient's consent)  Screening Mammography  Bone mass measurement (DEXA)  Screening for glaucoma    Diagnoses and all orders for this visit:    1. Medicare annual wellness visit, subsequent    2. Advanced directives, counseling/discussion  See acp note    3. Encounter for screening mammogram for malignant neoplasm of breast  -     ODESSA MAMMO BI SCREENING INCL CAD; Future    4. Postmenopausal state  -     DEXA BONE DENSITY STUDY AXIAL;  Future        Health Maintenance Due     Health Maintenance Due   Topic Date Due    Hepatitis C Screening  Never done    COVID-19 Vaccine (1 of 2) Never done    Shingrix Vaccine Age 50> (1 of 2) Never done    GLAUCOMA SCREENING Q2Y  Never done    Bone Densitometry (Dexa) Screening  Never done    Lipid Screen  08/07/2020    Breast Cancer Screen Mammogram  11/01/2020     Follow up: next awv due in 12 months    I have discussed the diagnosis with the patient and the intended plan as seen in the above orders. The patient has received an after-visit summary and questions were answered concerning future plans. Patient conveyed understanding of the plan at the time of the visit.     Alicia Dacosta NP  03/05/21

## 2021-03-05 NOTE — PROGRESS NOTES
Subjective:     Isis Mejía is a 78 y.o. female who presents for follow up of hypertension, asthma, ANISA, allergic rhinitis, and hyperlipidemia. Diet and Lifestyle: generally follows a low fat low cholesterol diet, generally follows a low sodium diet, sedentary, nonsmoker  Home BP Monitoring: is not measured at home    Cardiovascular risk analysis - LDL goal is under 100  hypertension  hyperlipidemia  obese. Compliance with treatment thus far has been good. ROS: taking medications as instructed, no medication side effects noted. Cardiovascular ROS: taking medications as instructed, no medication side effects noted, no TIA's, no chest pain on exertion, no dyspnea on exertion, no swelling of ankles, no orthostatic dizziness or lightheadedness, no orthopnea or paroxysmal nocturnal dyspnea, no palpitations, no intermittent claudication symptoms. Anxiety well-controlled on paroxetine, tolerating well, good compliance, no apparent side effects. No thoughts of harming self or others. Had 2 iron infusions with hematology in January, notes fatigue/energy have improved with this. 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 Allergy symptoms well-controlled on current regimen of xyzal and montelukast. Good compliance, tolerating well, no apparent side effects. New concerns: none. Patient Active Problem List   Diagnosis Code    GERD (gastroesophageal reflux disease) K21.9    Obesity (BMI 30-39. 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 9)     Psychiatric disorder     depression/ not currently taking    Vertigo     Vestibulopathy 2016     Past Surgical History:   Procedure Laterality Date    COLONOSCOPY N/A 2018    COLONOSCOPY performed by David Kc MD at [facility] [facility] COLONOSCOPY N/A 2020    COLONOSCOPY performed by Arabella Gilmore MD at [facility] HX COLONOSCOPY  10/25/2011    1 polyp which was removed, diverticulosis in distal descending colon & sigmoid colon.  HX ENDOSCOPY  2014    HX GYN  1996    hysterectomy    HX ORTHOPAEDIC Left     foot surgery    HX OTHER SURGICAL  2006? L Lower Abd.  Basal Cell CA    HX TONSILLECTOMY       Family History   Problem Relation Age of Onset    Hypertension Mother     Cancer Father     Stroke Maternal Grandmother     Cancer Paternal Grandmother         breast cancer    Breast Cancer Paternal Grandmother     Cancer Maternal Grandfather      Social History     Tobacco Use    Smoking status: Former Smoker     Packs/day: 1.00     Years: 18.00     Pack years: 18.00     Types: Cigarettes     Quit date: 1988     Years since quittin.6    Smokeless tobacco: Never Used    Tobacco comment: Quit in    Substance Use Topics    Alcohol use: Yes     Comment: rarely        Lab Results   Component Value Date/Time    WBC 10.5 2021 03:18 PM    HGB 15.4 2021 03:18 PM    Hemoglobin (POC) 8.8 2014 11:34 AM    Hemoglobin (POC) 10.2 (L) 10/15/2011 03:12 PM    HCT 44.7 2021 03:18 PM    Hematocrit (POC) 30 (L) 10/15/2011 03:12 PM    PLATELET 112 7046 03:18 PM    MCV 84.7 2021 03:18 PM     Lab Results   Component Value Date/Time    Hemoglobin A1c 5.1 09/25/2018 09:50 AM    Glucose 93 02/07/2020 12:51 PM    Glucose (POC) 83 07/26/2016 12:03 PM    Glucose POC 90 12/05/2016 11:08 AM    Microalb/Creat ratio (ug/mg creat.) 3.7 07/16/2012 10:51 AM    LDL, calculated 56 08/07/2019 11:03 AM    Creatinine (POC) 1.1 10/15/2011 03:12 PM    Creatinine 0.93 02/07/2020 12:51 PM      Lab Results   Component Value Date/Time    Cholesterol, total 125 08/07/2019 11:03 AM    HDL Cholesterol 27 (L) 08/07/2019 11:03 AM    LDL, calculated 56 08/07/2019 11:03 AM    Triglyceride 211 (H) 08/07/2019 11:03 AM    CHOL/HDL Ratio 5.6 (H) 07/26/2016 12:36 PM     Lab Results   Component Value Date/Time    ALT (SGPT) 22 08/07/2019 11:03 AM    Alk. phosphatase 70 08/07/2019 11:03 AM    Bilirubin, direct 0.1 10/15/2011 03:07 PM    Bilirubin, total 0.5 08/07/2019 11:03 AM    Albumin 4.4 08/07/2019 11:03 AM    Protein, total 6.8 08/07/2019 11:03 AM    INR 1.0 10/15/2011 03:07 PM    INR (POC) 1.0 07/26/2016 12:07 PM    Prothrombin time 10.0 10/15/2011 03:07 PM    PLATELET 194 93/50/0718 03:18 PM     Lab Results   Component Value Date/Time    GFR est non-AA >60 02/07/2020 12:51 PM    GFRNA, POC 54 (L) 10/15/2011 03:12 PM    GFR est AA >60 02/07/2020 12:51 PM    GFRAA, POC >60 10/15/2011 03:12 PM    Creatinine 0.93 02/07/2020 12:51 PM    Creatinine (POC) 1.1 10/15/2011 03:12 PM    BUN 14 02/07/2020 12:51 PM    BUN (POC) 10 10/15/2011 03:12 PM    Sodium 142 02/07/2020 12:51 PM    Sodium (POC) 141 10/15/2011 03:12 PM    Potassium 3.7 02/07/2020 12:51 PM    Potassium (POC) 3.5 10/15/2011 03:12 PM    Chloride 109 (H) 02/07/2020 12:51 PM    Chloride (POC) 104 10/15/2011 03:12 PM    CO2 29 02/07/2020 12:51 PM    Magnesium 1.9 07/27/2016 01:03 AM     Lab Results   Component Value Date/Time    TSH 3.090 09/25/2018 09:50 AM    T4, Free 0.98 04/09/2013 04:10 PM         Review of Systems, additional:  A comprehensive review of systems was negative except for that written in the HPI.     Objective:     Visit Vitals  /83 (BP 1 Location: Left upper arm, BP Patient Position: Sitting)   Pulse (!) 56   Temp 97.7 °F (36.5 °C) (Oral)   Resp 16   Ht 5' 4\" (1.626 m)   Wt 221 lb 3.2 oz (100.3 kg)   LMP 01/01/1996   SpO2 97%   BMI 37.97 kg/m²     Physical Examination: General appearance - alert, well appearing, and in no distress, oriented to person, place, and time and well hydrated  Mental status - normal mood, behavior, speech, dress, motor activity, and thought processes  Eyes - pupils equal and reactive, extraocular eye movements intact, sclera anicteric  Ears - bilateral TM's and external ear canals normal  Nose - normal and patent, no erythema, discharge or polyps  Mouth - mucous membranes moist, pharynx normal without lesions, dental hygiene good and tongue normal  Neck - supple, no significant adenopathy, thyroid exam: thyroid is normal in size without nodules or tenderness  Chest - clear to auscultation, no wheezes, rales or rhonchi, symmetric air entry  Heart - normal rate, regular rhythm, normal S1, S2, no murmurs, rubs, clicks or gallops, normal bilateral carotid upstroke without bruits, no JVD  Abdomen - soft, nontender, nondistended, no masses or organomegaly  bowel sounds normal  Neurological - alert, oriented, normal speech, no focal findings or movement disorder noted  Musculoskeletal - no joint tenderness, deformity or swelling, no muscular tenderness noted  Extremities - no pedal edema noted, intact peripheral pulses  Skin - normal coloration and turgor, no rashes, no suspicious skin lesions noted      Assessment/Plan:     .  reviewed diet, exercise and weight control  copy of written low fat low cholesterol diet provided and reviewed  cardiovascular risk and specific lipid/LDL goals reviewed  reviewed medications and side effects in detail  reviewed potential future medication changes and side effects. Diagnoses and all orders for this visit:    1.  Essential hypertension with goal blood pressure less than 130/80  at goal  Continue losartan, maxide  DASH diet  Weight loss recommended  150 minutes of moderate intensity exercise weekly  -     CBC W/O DIFF; Future  -     TSH 3RD GENERATION; Future    2. Hypercholesterolemia  Control unknown  Fasting lipids today  Continue current dose rosuvastatin pending results  Heart healthy diet recommended  -     LIPID PANEL; Future  -     METABOLIC PANEL, COMPREHENSIVE; Future    3. ANISA (generalized anxiety disorder)  Controlled  Continue paroxetine    4. Moderate persistent asthma with acute exacerbation  Symptom pattern stable  Continue symbicort  Albuterol prn  Continue regular f/u with pulmonary    5. Non-seasonal allergic rhinitis, unspecified trigger  Controlled  Continue xyzal, montelukast    Follow-up and Dispositions    · Return in about 6 months (around 9/5/2021), or if symptoms worsen or fail to improve, for cholesterol and fasting labs, blood pressure. I have discussed the diagnosis with the patient and the intended plan as seen in the above orders. The patient has received an after-visit summary and questions were answered concerning future plans. Patient conveyed understanding of the plan at the time of the visit.     Elaine Matias NP  03/05/21

## 2021-03-05 NOTE — PROGRESS NOTES
Joana Donahue is a 78 y.o. female    Chief Complaint   Patient presents with   Jan Uribe Annual Wellness Visit    Labs       1. Have you been to the ER, urgent care clinic since your last visit? Hospitalized since your last visit? No    2. Have you seen or consulted any other health care providers outside of the [address] [place] [address] since your last visit? Include any pap smears or colon screening.  No

## 2021-03-06 LAB
ALBUMIN SERPL-MCNC: 3.9 G/DL (ref 3.5–5)
ALBUMIN/GLOB SERPL: 1.2 {RATIO} (ref 1.1–2.2)
ALP SERPL-CCNC: 81 U/L (ref 45–117)
ALT SERPL-CCNC: 28 U/L (ref 12–78)
ANION GAP SERPL CALC-SCNC: 8 MMOL/L (ref 5–15)
AST SERPL-CCNC: 21 U/L (ref 15–37)
BILIRUB SERPL-MCNC: 0.5 MG/DL (ref 0.2–1)
BUN SERPL-MCNC: 11 MG/DL (ref 6–20)
BUN/CREAT SERPL: 13 (ref 12–20)
CALCIUM SERPL-MCNC: 8.8 MG/DL (ref 8.5–10.1)
CHLORIDE SERPL-SCNC: 104 MMOL/L (ref 97–108)
CHOLEST SERPL-MCNC: 131 MG/DL
CO2 SERPL-SCNC: 29 MMOL/L (ref 21–32)
CREAT SERPL-MCNC: 0.84 MG/DL (ref 0.55–1.02)
ERYTHROCYTE [DISTWIDTH] IN BLOOD BY AUTOMATED COUNT: 13.9 % (ref 11.5–14.5)
GLOBULIN SER CALC-MCNC: 3.2 G/DL (ref 2–4)
GLUCOSE SERPL-MCNC: 93 MG/DL (ref 65–100)
HCT VFR BLD AUTO: 45.7 % (ref 35–47)
HDLC SERPL-MCNC: 33 MG/DL
HDLC SERPL: 4 {RATIO} (ref 0–5)
HGB BLD-MCNC: 15 G/DL (ref 11.5–16)
LDLC SERPL CALC-MCNC: 54.8 MG/DL (ref 0–100)
LIPID PROFILE,FLP: ABNORMAL
MCH RBC QN AUTO: 29.2 PG (ref 26–34)
MCHC RBC AUTO-ENTMCNC: 32.8 G/DL (ref 30–36.5)
MCV RBC AUTO: 88.9 FL (ref 80–99)
NRBC # BLD: 0 K/UL (ref 0–0.01)
NRBC BLD-RTO: 0 PER 100 WBC
PLATELET # BLD AUTO: 182 K/UL (ref 150–400)
PMV BLD AUTO: 10.1 FL (ref 8.9–12.9)
POTASSIUM SERPL-SCNC: 3.4 MMOL/L (ref 3.5–5.1)
PROT SERPL-MCNC: 7.1 G/DL (ref 6.4–8.2)
RBC # BLD AUTO: 5.14 M/UL (ref 3.8–5.2)
SODIUM SERPL-SCNC: 141 MMOL/L (ref 136–145)
TRIGL SERPL-MCNC: 216 MG/DL (ref ?–150)
TSH SERPL DL<=0.05 MIU/L-ACNC: 1.98 UIU/ML (ref 0.36–3.74)
VLDLC SERPL CALC-MCNC: 43.2 MG/DL
WBC # BLD AUTO: 9.6 K/UL (ref 3.6–11)

## 2021-03-11 NOTE — PROGRESS NOTES
Thyroid level is normal.  Blood counts normal.  Electrolytes, liver and kidney function normal with exception of potassium level just below normal. Recommend eating a banana every day to help with this. Triglycerides are elevated. Recommend decreasing sugar and simple carbs in diet, repeating fasting lipids in 6 months.  LDL is at goal, continue rosuvastatin

## 2021-03-30 RX ORDER — POTASSIUM CHLORIDE 20 MEQ/1
TABLET, EXTENDED RELEASE ORAL
Qty: 90 TAB | Refills: 3 | Status: SHIPPED | OUTPATIENT
Start: 2021-03-30 | End: 2022-02-14 | Stop reason: SDUPTHER

## 2021-05-06 ENCOUNTER — HOSPITAL ENCOUNTER (OUTPATIENT)
Dept: MAMMOGRAPHY | Age: 71
Discharge: HOME | End: 2021-05-06
Attending: NURSE PRACTITIONER
Payer: MEDICARE

## 2021-05-06 DIAGNOSIS — Z12.31 ENCOUNTER FOR SCREENING MAMMOGRAM FOR MALIGNANT NEOPLASM OF BREAST: ICD-10-CM

## 2021-05-06 DIAGNOSIS — Z78.0 POSTMENOPAUSAL STATE: ICD-10-CM

## 2021-05-06 PROCEDURE — 77067 SCR MAMMO BI INCL CAD: CPT

## 2021-05-06 PROCEDURE — 77080 DXA BONE DENSITY AXIAL: CPT

## 2021-05-07 NOTE — PROGRESS NOTES
Normal bone density test. Recommend continuing calcium and vitamin D, along with weight bearing exercise, to maintain bone mass. Repeat screening is recommended in 2 years.

## 2021-05-18 ENCOUNTER — TELEPHONE (OUTPATIENT)
Dept: FAMILY MEDICINE CLINIC | Age: 71
End: 2021-05-18

## 2021-05-18 NOTE — TELEPHONE ENCOUNTER
Pt called needing Rosuvastatin refill. Pt needs new script sent in to [facility] DR ABRAM HURTADO on file.

## 2021-09-02 ENCOUNTER — TELEPHONE (OUTPATIENT)
Dept: ONCOLOGY | Age: 71
End: 2021-09-02

## 2021-09-02 NOTE — TELEPHONE ENCOUNTER
[address] at [place] [address]  [phone]    09/02/21- Phone call placed to pt to remind pt to have labs drawn prior to her follow up appointment with .  left for patient.

## 2021-09-10 NOTE — PROGRESS NOTES
[facility] at [address], [address]  Selene Munozne: [phone]  F: [phone]      Reason for Visit:   Rubin Dubin is a 73 y.o. female who is seen for follow up of iron deficiency anemia. History of Present Illness:   She reports good energy in the morning, but runs out of steam around 5pm.  Naps occasionally. Dizziness intermittent, but overall better than it used to be. Does not tolerate iron pills, but tries to get some iron in her diet. Review of systems was obtained and pertinent findings reviewed above. Past medical history, social history, family history, medications, and allergies are located in the electronic medical record. Physical Exam:     Visit Vitals  LMP 01/01/1996     General: alert, cooperative, no distress   Mental  status: normal mood, behavior, speech, dress, motor activity, and thought processes, able to follow commands   HENT: NCAT   Neck: no visualized mass   Resp: no respiratory distress   Neuro: no gross deficits   Skin: no discoloration or lesions of concern on visible areas   Psychiatric: normal affect, consistent with stated mood, no evidence of hallucinations       Due to this being a TeleHealth evaluation (During XXDVL-83 public health emergency), many elements of the physical examination are unable to be assessed. Evaluation of the following organ systems was limited: Vitals/Constitutional/EENT/Resp/CV/GI//MS/Neuro/Skin/Heme-Lymph-Imm. Results:     Lab Results   Component Value Date/Time    WBC 11.5 (H) 09/09/2021 03:34 PM    HGB 15.0 09/09/2021 03:34 PM    HCT 46.8 09/09/2021 03:34 PM    PLATELET 630 62/20/7680 03:34 PM    MCV 86.0 09/09/2021 03:34 PM    ABS.  NEUTROPHILS 8.2 (H) 09/09/2021 03:34 PM    Hemoglobin (POC) 8.8 02/20/2014 11:34 AM    Hemoglobin (POC) 10.2 (L) 10/15/2011 03:12 PM    Hematocrit (POC) 30 (L) 10/15/2011 03:12 PM     Lab Results   Component Value Date/Time    Sodium 141 03/05/2021 10:13 AM    Potassium 3.4 (L) 03/05/2021 10:13 AM    Chloride 104 03/05/2021 10:13 AM    CO2 29 03/05/2021 10:13 AM    Glucose 93 03/05/2021 10:13 AM    BUN 11 03/05/2021 10:13 AM    Creatinine 0.84 03/05/2021 10:13 AM    GFR est AA >60 03/05/2021 10:13 AM    GFR est non-AA >60 03/05/2021 10:13 AM    Calcium 8.8 03/05/2021 10:13 AM    Sodium (POC) 141 10/15/2011 03:12 PM    Potassium (POC) 3.5 10/15/2011 03:12 PM    Chloride (POC) 104 10/15/2011 03:12 PM    Glucose (POC) 83 07/26/2016 12:03 PM    Glucose POC 90 12/05/2016 11:08 AM    BUN (POC) 10 10/15/2011 03:12 PM    Creatinine (POC) 1.1 10/15/2011 03:12 PM    Calcium, ionized (POC) 1.11 (L) 10/15/2011 03:12 PM     Lab Results   Component Value Date/Time    Bilirubin, total 0.5 03/05/2021 10:13 AM    Alk.  phosphatase 81 03/05/2021 10:13 AM    Protein, total 7.1 03/05/2021 10:13 AM    Albumin 3.9 03/05/2021 10:13 AM    Globulin 3.2 03/05/2021 10:13 AM    A-G Ratio 1.2 03/05/2021 10:13 AM       Lab Results   Component Value Date/Time    Reticulocyte count 1.9 02/20/2014 10:50 AM    Iron % saturation 11 (L) 09/09/2021 03:34 PM    TIBC 425 09/09/2021 03:34 PM    Ferritin 14 09/09/2021 03:34 PM    Vitamin B12 380 08/21/2012 09:45 AM    Folate 15.5 08/21/2012 09:45 AM    Haptoglobin 101 02/20/2014 10:50 AM     (H) 12/08/2011 02:38 PM    Sed rate (ESR) 5 05/05/2014 10:40 AM    TSH 1.98 03/05/2021 10:13 AM    INR 1.0 10/15/2011 03:07 PM    INR (POC) 1.0 07/26/2016 12:07 PM    Lipase 70 (L) 05/10/2012 12:30 PM     HGB (g/dL)   Date Value   09/09/2021 15.0   03/05/2021 15.0   01/26/2021 15.4   11/06/2020 9.3 (L)   10/27/2020 9.4 (L)   02/07/2020 11.4 (L)   10/25/2019 13.0   10/26/2018 15.1   09/25/2018 16.2 (H)   04/25/2018 15.6   01/16/2018 11.1   07/17/2017 12.3   04/17/2017 13.8   01/05/2017 15.1   07/27/2016 13.0   07/26/2016 13.9   07/15/2016 13.7   05/03/2016 9.0 (L)   12/07/2015 12.3   06/03/2015 14.4   03/17/2015 14.9   12/08/2014 16.4 (H) 06/11/2014 15.5   05/05/2014 14.0   04/11/2014 13.4   03/05/2014 9.2 (L)   02/18/2014 8.5 (L)   04/09/2013 12.8   07/16/2012 11.6   05/11/2012 11.9   05/10/2012 13.2   01/19/2012 14.0   12/08/2011 13.6   10/15/2011 8.7 (L)   09/29/2011 8.5 (L)       Ferritin   Date Value   09/09/2021 14 NG/ML   01/26/2021 25 NG/ML (L)   11/06/2020 4 NG/ML (L)   10/27/2020 4 NG/ML (L)   10/25/2019 12 ng/mL (L)   10/26/2018 29 ng/mL   04/25/2018 72 ng/mL   01/16/2018 8 ng/mL (L)   07/17/2017 12 ng/mL (L)   01/05/2017 24 ng/mL   07/15/2016 18 ng/mL   05/03/2016 4 ng/mL (L)   12/07/2015 10 ng/mL (L)   06/03/2015 17 ng/mL   12/08/2014 79 ng/mL   06/11/2014 40 NG/ML   04/11/2014 19 NG/ML   02/20/2014 5 ng/mL (L)   10/07/2011 5 ng/mL (L)       Stool Blood 2/24/2014: negative  Celiac panel 12/8/2014: negative    EGD and Colonscopy with Dr. Rayne Del Valle 4/9/2014: severe diverticulosis, nonbleeding  Capsule study 4/30/2014: normal    EGD and Colonoscopy with Dr. Oniel Rosales 6/16/2016: large cecum AVM, one polyp (tubular adenoma), otherwise normal.  Capsule endoscopy 6/30/2016: normal    EGD and Colonoscopy with Dr. Oniel Rosales 5/22/2018: Normal esophagus, stomach, duodenum, jejunum. AVM in colon treated. EGD and Colonoscopy with Dr. Oniel Rosales 6/2/2020:  EGD:  Esophageal ring, acquired. Probably related to GERD. Colonoscopy: Colon polyps, diverticulosis and right colon AVM. Assessment:   1) Anemia, Iron Deficiency  Recurrent. S/p feraheme in 5/2016 and 7/2016, and injectafer in 2/2018 and 11/2020. Anemia resolves with IV iron, but subsequently recurs. She cannot tolerate oral iron. HGB remains normal now at 15, but ferritin falling over time, down to 14. She is at risk of recurrence anemia, so we will continue to monitor. Check again in 6 months, then consider reducing frequency if stable. The source of her iron deficiency is likely recurrent AVMs. Celiac panel was negative, and no reason to suggest another malabsorption issue.   No evidence of bleeding from elsewhere. She is at risk for recurrence in the future, so we will monitor. 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 She is at risk for recurrence. Monitor. 3) COPD  Follows with pulmonary. Receiving an injection once a month which has helped her symptoms quite a bit. Plan:     Labs in 6 months: CBC, iron profile, ferritin ([facility])  Return to see me in 6 months    Chidi Shaw for video visits. Signed By: Kristy Griffin MD      The patient was evaluated through a synchronous (real-time) audio-video encounter. The patient (or guardian if applicable) is aware that this is a billable service. Verbal consent to proceed has been obtained within the past 12 months. The visit was conducted pursuant to the emergency declaration under the [facility], [address] [place] [address] [facility] authority and the [employer] and [employer] General Act. Patient identification was verified, and a caregiver was present when appropriate. The patient was located in a state where the provider was credentialed to provide care.

## 2021-09-14 ENCOUNTER — VIRTUAL VISIT (OUTPATIENT)
Dept: ONCOLOGY | Age: 71
End: 2021-09-14
Payer: MEDICARE

## 2021-09-14 DIAGNOSIS — D50.0 IRON DEFICIENCY ANEMIA DUE TO CHRONIC BLOOD LOSS: Primary | ICD-10-CM

## 2021-09-14 PROCEDURE — 1101F PT FALLS ASSESS-DOCD LE1/YR: CPT | Performed by: INTERNAL MEDICINE

## 2021-09-14 PROCEDURE — G8756 NO BP MEASURE DOC: HCPCS | Performed by: INTERNAL MEDICINE

## 2021-09-14 PROCEDURE — G8427 DOCREV CUR MEDS BY ELIG CLIN: HCPCS | Performed by: INTERNAL MEDICINE

## 2021-09-14 PROCEDURE — G8417 CALC BMI ABV UP PARAM F/U: HCPCS | Performed by: INTERNAL MEDICINE

## 2021-09-14 PROCEDURE — 1090F PRES/ABSN URINE INCON ASSESS: CPT | Performed by: INTERNAL MEDICINE

## 2021-09-14 PROCEDURE — 99214 OFFICE O/P EST MOD 30 MIN: CPT | Performed by: INTERNAL MEDICINE

## 2021-09-14 PROCEDURE — G8536 NO DOC ELDER MAL SCRN: HCPCS | Performed by: INTERNAL MEDICINE

## 2021-09-14 PROCEDURE — 3017F COLORECTAL CA SCREEN DOC REV: CPT | Performed by: INTERNAL MEDICINE

## 2021-09-14 PROCEDURE — G9899 SCRN MAM PERF RSLTS DOC: HCPCS | Performed by: INTERNAL MEDICINE

## 2021-09-14 PROCEDURE — G8432 DEP SCR NOT DOC, RNG: HCPCS | Performed by: INTERNAL MEDICINE

## 2021-09-14 PROCEDURE — G8399 PT W/DXA RESULTS DOCUMENT: HCPCS | Performed by: INTERNAL MEDICINE

## 2021-09-14 PROCEDURE — G0463 HOSPITAL OUTPT CLINIC VISIT: HCPCS | Performed by: INTERNAL MEDICINE

## 2021-09-14 NOTE — PATIENT INSTRUCTIONS
Thank you for participating in the virtual visit with Dr. Gisele Ferrell. We will call you soon to schedule a follow up appointment with us in 6 months. If you do not hear from us, please call us at [phone] to schedule your appointment. Please use the enclosed lab slip to have your labs done before your next appointment.

## 2021-09-14 NOTE — PROGRESS NOTES
Luly Quiñones is a 73 y.o. female follow up for anemia. 1. Have you been to the ER, urgent care clinic since your last visit? Hospitalized since your last visit?no    2. Have you seen or consulted any other health care providers outside of the [address] [place] [address] since your last visit? Include any pap smears or colon screening.  no

## 2021-10-25 DIAGNOSIS — F41.1 GAD (GENERALIZED ANXIETY DISORDER): ICD-10-CM

## 2021-10-25 RX ORDER — PANTOPRAZOLE SODIUM 40 MG/1
TABLET, DELAYED RELEASE ORAL
Qty: 90 TABLET | Refills: 1 | Status: SHIPPED | OUTPATIENT
Start: 2021-10-25 | End: 2022-02-14 | Stop reason: SDUPTHER

## 2021-10-25 RX ORDER — PAROXETINE HYDROCHLORIDE 20 MG/1
TABLET, FILM COATED ORAL
Qty: 90 TABLET | Refills: 1 | Status: SHIPPED | OUTPATIENT
Start: 2021-10-25 | End: 2022-02-14 | Stop reason: SDUPTHER

## 2021-11-11 DIAGNOSIS — E78.00 HYPERCHOLESTEROLEMIA: ICD-10-CM

## 2021-11-11 DIAGNOSIS — I10 ESSENTIAL HYPERTENSION WITH GOAL BLOOD PRESSURE LESS THAN 130/80: ICD-10-CM

## 2021-11-11 DIAGNOSIS — J30.89 NON-SEASONAL ALLERGIC RHINITIS, UNSPECIFIED TRIGGER: ICD-10-CM

## 2021-11-12 RX ORDER — LOSARTAN POTASSIUM 25 MG/1
TABLET ORAL
Qty: 90 TABLET | Refills: 0 | Status: SHIPPED | OUTPATIENT
Start: 2021-11-12 | End: 2022-02-14 | Stop reason: SDUPTHER

## 2021-11-12 RX ORDER — LEVOCETIRIZINE DIHYDROCHLORIDE 5 MG/1
TABLET, FILM COATED ORAL
Qty: 90 TABLET | Refills: 0 | Status: SHIPPED | OUTPATIENT
Start: 2021-11-12 | End: 2022-02-14 | Stop reason: SDUPTHER

## 2021-11-12 RX ORDER — TRIAMTERENE/HYDROCHLOROTHIAZID 37.5-25 MG
TABLET ORAL
Qty: 90 TABLET | Refills: 0 | Status: SHIPPED | OUTPATIENT
Start: 2021-11-12 | End: 2022-02-14 | Stop reason: SDUPTHER

## 2021-11-12 RX ORDER — MONTELUKAST SODIUM 10 MG/1
TABLET ORAL
Qty: 90 TABLET | Refills: 0 | Status: SHIPPED | OUTPATIENT
Start: 2021-11-12 | End: 2022-02-14 | Stop reason: SDUPTHER

## 2021-11-12 RX ORDER — ROSUVASTATIN CALCIUM 10 MG/1
TABLET, COATED ORAL
Qty: 90 TABLET | Refills: 0 | Status: SHIPPED | OUTPATIENT
Start: 2021-11-12 | End: 2022-02-14 | Stop reason: SDUPTHER

## 2022-02-14 ENCOUNTER — VIRTUAL VISIT (OUTPATIENT)
Dept: FAMILY MEDICINE CLINIC | Age: 72
End: 2022-02-14
Payer: MEDICARE

## 2022-02-14 DIAGNOSIS — J45.50 SEVERE PERSISTENT ASTHMA WITHOUT COMPLICATION: ICD-10-CM

## 2022-02-14 DIAGNOSIS — K21.9 GASTROESOPHAGEAL REFLUX DISEASE WITHOUT ESOPHAGITIS: ICD-10-CM

## 2022-02-14 DIAGNOSIS — E78.00 HYPERCHOLESTEROLEMIA: ICD-10-CM

## 2022-02-14 DIAGNOSIS — F41.1 GAD (GENERALIZED ANXIETY DISORDER): ICD-10-CM

## 2022-02-14 DIAGNOSIS — Z71.89 ADVANCED DIRECTIVES, COUNSELING/DISCUSSION: ICD-10-CM

## 2022-02-14 DIAGNOSIS — Z00.00 MEDICARE ANNUAL WELLNESS VISIT, SUBSEQUENT: Primary | ICD-10-CM

## 2022-02-14 DIAGNOSIS — I10 ESSENTIAL HYPERTENSION WITH GOAL BLOOD PRESSURE LESS THAN 130/80: ICD-10-CM

## 2022-02-14 DIAGNOSIS — Z11.59 NEED FOR HEPATITIS C SCREENING TEST: ICD-10-CM

## 2022-02-14 DIAGNOSIS — J30.89 NON-SEASONAL ALLERGIC RHINITIS, UNSPECIFIED TRIGGER: ICD-10-CM

## 2022-02-14 PROCEDURE — G0463 HOSPITAL OUTPT CLINIC VISIT: HCPCS | Performed by: NURSE PRACTITIONER

## 2022-02-14 PROCEDURE — G8536 NO DOC ELDER MAL SCRN: HCPCS | Performed by: NURSE PRACTITIONER

## 2022-02-14 PROCEDURE — G8417 CALC BMI ABV UP PARAM F/U: HCPCS | Performed by: NURSE PRACTITIONER

## 2022-02-14 PROCEDURE — G8427 DOCREV CUR MEDS BY ELIG CLIN: HCPCS | Performed by: NURSE PRACTITIONER

## 2022-02-14 PROCEDURE — G8432 DEP SCR NOT DOC, RNG: HCPCS | Performed by: NURSE PRACTITIONER

## 2022-02-14 PROCEDURE — G8399 PT W/DXA RESULTS DOCUMENT: HCPCS | Performed by: NURSE PRACTITIONER

## 2022-02-14 PROCEDURE — 99214 OFFICE O/P EST MOD 30 MIN: CPT | Performed by: NURSE PRACTITIONER

## 2022-02-14 PROCEDURE — G9899 SCRN MAM PERF RSLTS DOC: HCPCS | Performed by: NURSE PRACTITIONER

## 2022-02-14 PROCEDURE — 3017F COLORECTAL CA SCREEN DOC REV: CPT | Performed by: NURSE PRACTITIONER

## 2022-02-14 PROCEDURE — 1090F PRES/ABSN URINE INCON ASSESS: CPT | Performed by: NURSE PRACTITIONER

## 2022-02-14 PROCEDURE — 1101F PT FALLS ASSESS-DOCD LE1/YR: CPT | Performed by: NURSE PRACTITIONER

## 2022-02-14 PROCEDURE — G0439 PPPS, SUBSEQ VISIT: HCPCS | Performed by: NURSE PRACTITIONER

## 2022-02-14 PROCEDURE — G8756 NO BP MEASURE DOC: HCPCS | Performed by: NURSE PRACTITIONER

## 2022-02-14 RX ORDER — LEVOCETIRIZINE DIHYDROCHLORIDE 5 MG/1
5 TABLET, FILM COATED ORAL DAILY
Qty: 90 TABLET | Refills: 3 | Status: SHIPPED | OUTPATIENT
Start: 2022-02-14

## 2022-02-14 RX ORDER — PANTOPRAZOLE SODIUM 40 MG/1
40 TABLET, DELAYED RELEASE ORAL DAILY
Qty: 90 TABLET | Refills: 3 | Status: SHIPPED | OUTPATIENT
Start: 2022-02-14 | End: 2022-04-21

## 2022-02-14 RX ORDER — MONTELUKAST SODIUM 10 MG/1
10 TABLET ORAL DAILY
Qty: 90 TABLET | Refills: 3 | Status: SHIPPED | OUTPATIENT
Start: 2022-02-14

## 2022-02-14 RX ORDER — LOSARTAN POTASSIUM 25 MG/1
25 TABLET ORAL DAILY
Qty: 90 TABLET | Refills: 3 | Status: SHIPPED | OUTPATIENT
Start: 2022-02-14

## 2022-02-14 RX ORDER — POTASSIUM CHLORIDE 20 MEQ/1
TABLET, EXTENDED RELEASE ORAL
Qty: 90 TABLET | Refills: 3 | Status: SHIPPED | OUTPATIENT
Start: 2022-02-14

## 2022-02-14 RX ORDER — TRIAMTERENE/HYDROCHLOROTHIAZID 37.5-25 MG
1 TABLET ORAL DAILY
Qty: 90 TABLET | Refills: 3 | Status: SHIPPED | OUTPATIENT
Start: 2022-02-14

## 2022-02-14 RX ORDER — ROSUVASTATIN CALCIUM 10 MG/1
10 TABLET, COATED ORAL
Qty: 90 TABLET | Refills: 3 | Status: SHIPPED | OUTPATIENT
Start: 2022-02-14

## 2022-02-14 RX ORDER — PAROXETINE HYDROCHLORIDE 20 MG/1
20 TABLET, FILM COATED ORAL DAILY
Qty: 90 TABLET | Refills: 3 | Status: SHIPPED | OUTPATIENT
Start: 2022-02-14 | End: 2022-04-21

## 2022-02-14 RX ORDER — MEPOLIZUMAB 100 MG/ML
100 INJECTION, SOLUTION SUBCUTANEOUS
COMMUNITY

## 2022-02-14 NOTE — ACP (ADVANCE CARE PLANNING)
Advance Care Planning     Advance Care Planning (ACP) Provider Conversation Snapshot     Date of ACP Conversation: 2/14/2022    Persons included in Conversation:  patient  Length of ACP Conversation in minutes:  <16 minutes (Non-Billable)     Authorized Decision Maker (if patient is incapable of making informed decisions): This person is:    Shanice Sellers (named in advanced directive)                                                       For Patients with Decision Making Capacity:   Values/Goals: Exploration of values, goals, and preferences if recovery is not expected, even with continued medical treatment in the event of:  Imminent death  Severe, permanent brain injury  \"In these circumstances, what matters most to you? \"  Care focused more on comfort or quality of life. Conversation Outcomes / Follow-Up Plan:   Patient reports existing advanced directive which reflects her current wishes. Recommended communicating the plan and making copies for the healthcare agent, personal physician, and others as appropriate (e.g., health system)  Recommended review of completed ACP document annually or upon change in health status      Requested a copy for her chart. She can drop by the office or take with her to her upcoming appt with dr. Charisma Watkins.     Lizet Graff NP  2/14/2022

## 2022-02-14 NOTE — PROGRESS NOTES
This is the Subsequent Medicare Annual Wellness Exam, performed 12 months or more after the Initial AWV or the last Subsequent AWV    I have reviewed the patient's medical history in detail and updated the computerized patient record. Assessment/Plan       1. Medicare annual wellness visit, subsequent  Education and counseling provided:  Are appropriate based on today's review and evaluation  End-of-Life planning (with patient's consent)  hep c screening  shingrix vaccine    2. Advanced directives, counseling/discussion  See acp note 2/14/2022    3. Need for hepatitis C screening test  -     HEPATITIS C AB; Future     Follow up: next AWV recommended in 12 months    I have discussed the diagnosis with the patient and the intended plan as seen in the above orders. The patient has received an after-visit summary and questions were answered concerning future plans. Patient conveyed understanding of the plan at the time of the visit. Depression Risk Factor Screening:     3 most recent PHQ Screens 3/5/2021   Little interest or pleasure in doing things Not at all   Feeling down, depressed, irritable, or hopeless Not at all   Total Score PHQ 2 0       Alcohol & Drug Abuse Risk Screen    Do you average more than 1 drink per night or more than 7 drinks a week:  No    On any one occasion in the past three months have you have had more than 3 drinks containing alcohol:  No          Functional Ability and Level of Safety:    Hearing: Hearing is good. Activities of Daily Living: The home contains: handrails  Patient does total self care      Ambulation: with no difficulty     Fall Risk:  Fall Risk Assessment, last 12 mths 3/5/2021   Able to walk? Yes   Fall in past 12 months? 0   Do you feel unsteady? 0   Are you worried about falling 0   Number of falls in past 12 months -   Fall with injury?  -      Abuse Screen:  Patient is not abused       Cognitive Screening    Has your family/caregiver stated any concerns about your memory: no    Cognitive Screening: normal    Health Maintenance Due     Health Maintenance Due   Topic Date Due    Hepatitis C Screening  Never done    Shingrix Vaccine Age 50> (1 of 2) Never done       Patient Care Team   Patient Care Team:  Camelia Bartlett NP as PCP - General (Nurse Practitioner)  Camelia Bartlett NP as PCP [facility]  Paulette Azul MD (Hematology and Oncology)  Mary Joel MD (Gastroenterology)  Alvarado Ryan MD as Physician (Pulmonary Disease)    History     Patient Active Problem List   Diagnosis Code    GERD (gastroesophageal reflux disease) K21.9    Obesity (BMI 30-39. 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 9)     Psychiatric disorder     depression/ not currently taking    Vertigo     Vestibulopathy 7/27/2016      Past Surgical History:   Procedure Laterality Date    COLONOSCOPY N/A 5/22/2018    COLONOSCOPY performed by Odilia Tony MD at [address] [place] [address] COLONOSCOPY N/A 6/2/2020    COLONOSCOPY performed by Mary Joel MD at [address] [place] [address] HX COLONOSCOPY  10/25/2011    1 polyp which was removed, diverticulosis in distal descending colon & sigmoid colon.  HX ENDOSCOPY  04/2014    HX GYN  1996    hysterectomy    HX ORTHOPAEDIC Left     foot surgery    HX OTHER SURGICAL  2006? L Lower Abd. Basal Cell CA    HX TONSILLECTOMY       Current Outpatient Medications   Medication Sig Dispense Refill    PARoxetine (PAXIL) 20 mg tablet Take 1 Tablet by mouth daily. 90 Tablet 3    pantoprazole (PROTONIX) 40 mg tablet Take 1 Tablet by mouth daily. 90 Tablet 3    montelukast (SINGULAIR) 10 mg tablet Take 1 Tablet by mouth daily. 90 Tablet 3    levocetirizine (XYZAL) 5 mg tablet Take 1 Tablet by mouth daily. 90 Tablet 3    triamterene-hydroCHLOROthiazide (MAXZIDE) 37.5-25 mg per tablet Take 1 Tablet by mouth daily. 90 Tablet 3    losartan (COZAAR) 25 mg tablet Take 1 Tablet by mouth daily. 90 Tablet 3    rosuvastatin (CRESTOR) 10 mg tablet Take 1 Tablet by mouth nightly. 90 Tablet 3    potassium chloride (K-DUR, KLOR-CON M20) 20 mEq tablet Take 1 tablet by mouth once daily 90 Tablet 3    mepolizumab (Nucala) 100 mg/mL atIn injection 100 mg by SubCUTAneous route every month.  budesonide/formoterol fumarate (SYMBICORT IN) Take  by inhalation.  Nebulizer & Compressor machine Use as directed 1 Each 0    ipratropium (ATROVENT) 0.02 % nebulizer solution 2.5 mL by Nebulization route as needed for Wheezing (every 6 hours as needed).  62.5 mL 0     Allergies   Allergen Reactions    Loratadine Nausea Only and Other (comments)     Muscle weakness       Family History   Problem Relation Age of Onset    Hypertension Mother     Cancer Father     Stroke Maternal Grandmother     Cancer Paternal Grandmother         breast cancer    Breast Cancer Paternal Grandmother     Cancer Maternal Grandfather      Social History     Tobacco Use    Smoking status: Former Smoker     Packs/day: 1.00     Years: 18.00     Pack years: 18.00     Types: Cigarettes     Quit date: 1988     Years since quittin.5    Smokeless tobacco: Never Used    Tobacco comment: Quit in    Substance Use Topics    Alcohol use: Yes     Comment: rarely       Niki Rushing, was evaluated through a synchronous (real-time) audio-video encounter. The patient (or guardian if applicable) is aware that this is a billable service, which includes applicable co-pays. Verbal consent to proceed has been obtained. The visit was conducted pursuant to the emergency declaration under the [address] [place] [address], [address] [place] [address] authority and the [employer] General Act. Patient identification was verified, and a caregiver was present when appropriate. The patient was located at home in a state where the provider was licensed to provide care.        Nora Olvera NP    2/14/2022

## 2022-02-14 NOTE — PATIENT INSTRUCTIONS

## 2022-02-15 NOTE — PROGRESS NOTES
Niki Rushing is a 73 y.o. female who was seen by synchronous (real-time) audio-video technology on 2/14/2022 for Medication Refill        Assessment & Plan:   Diagnoses and all orders for this visit:    1. Essential hypertension with goal blood pressure less than 130/80  At goal at last check  Continue losartan, Maxide  Continue potassium supplement  Low-sodium diet recommended  Weight loss recommended  Increase physical activity recommended  -     triamterene-hydroCHLOROthiazide (MAXZIDE) 37.5-25 mg per tablet; Take 1 Tablet by mouth daily. -     losartan (COZAAR) 25 mg tablet; Take 1 Tablet by mouth daily. -     potassium chloride (K-DUR, KLOR-CON M20) 20 mEq tablet; Take 1 tablet by mouth once daily  -     TSH 3RD GENERATION; Future    2. Hypercholesterolemia   Last LDL at goal  Due for repeat fasting lipids  Continue rosuvastatin at current dose pending results  Heart healthy diet low in saturated fat recommended  -     rosuvastatin (CRESTOR) 10 mg tablet; Take 1 Tablet by mouth nightly. -     LIPID PANEL; Future  -     METABOLIC PANEL, COMPREHENSIVE; Future    3. Severe persistent asthma without complication  Stable  No acute symptoms warranting change in current treatment plan  Follow-up with pulmonology for ongoing management as scheduled    4. Non-seasonal allergic rhinitis, unspecified trigger  Symptoms controlled  Continue montelukast, levocetirizine  -     montelukast (SINGULAIR) 10 mg tablet; Take 1 Tablet by mouth daily. -     levocetirizine (XYZAL) 5 mg tablet; Take 1 Tablet by mouth daily. 5. Gastroesophageal reflux disease without esophagitis  Symptoms controlled  Continue PPI  GERD diet recommended  -     pantoprazole (PROTONIX) 40 mg tablet; Take 1 Tablet by mouth daily. 6. ANISA (generalized anxiety disorder)  Doing well on current treatment  Continue Rx  -     PARoxetine (PAXIL) 20 mg tablet; Take 1 Tablet by mouth daily.       Follow-up and Dispositions    · Return in about 6 months (around 8/14/2022), or if symptoms worsen or fail to improve, for blood pressure, cholesterol and fasting labs. I have discussed the diagnosis with the patient and the intended plan as seen in the above orders, and questions were answered concerning future plans. Patient conveyed understanding of the plan at the time of the visit. 712  Subjective:     Presents for follow-up of hypertension, hyperlipidemia, asthma, allergies, anxiety, and reflux. Cardiovascular risk analysis - LDL goal is under 100  hypertension  hyperlipidemia  former smoker  obese  sedentary lifestyle. Compliance with treatment thus far has been good. Diet and Lifestyle: generally follows a low fat low cholesterol diet, generally follows a low sodium diet, exercises sporadically, nonsmoker  Home BP Monitoring: is well controlled at home    Cardiovascular ROS: taking medications as instructed, no medication side effects noted, no TIA's, no chest pain on exertion, no dyspnea on exertion, no swelling of ankles, no orthostatic dizziness or lightheadedness, no orthopnea or paroxysmal nocturnal dyspnea, no palpitations, no intermittent claudication symptoms. Anxiety well controlled on paroxetine. Good compliance, tolerating well, no apparent side effects. No thoughts of harming self or others. Patient wants to continue current treatment plan. Reflux symptoms are well controlled on pantoprazole. Symptoms were uncontrolled on a lower dose of the medication. Patient reports good compliance, tolerating well, no apparent side effects. Wants to continue current therapy. Reports she is generally compliant with recommended diet restrictions. Allergy symptoms currently controlled with levo cetirizine and montelukast.  Patient reports good compliance, tolerating well, no apparent side effects. 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 [facility].   Her current symptom pattern is classified as severe but controlled. Current treatment plan includes Nucala monthly injections, Symbicort twice daily, and albuterol and Atrovent via nebulizer as needed. No recent emergency room visits or admissions related to asthma. Prior to Admission medications    Medication Sig Start Date End Date Taking? Authorizing Provider   PARoxetine (PAXIL) 20 mg tablet Take 1 Tablet by mouth daily. 2/14/22  Yes Kota Hart NP   pantoprazole (PROTONIX) 40 mg tablet Take 1 Tablet by mouth daily. 2/14/22  Yes Kota Hart NP   montelukast (SINGULAIR) 10 mg tablet Take 1 Tablet by mouth daily. 2/14/22  Yes Kota Hart NP   levocetirizine (XYZAL) 5 mg tablet Take 1 Tablet by mouth daily. 2/14/22  Yes Kota Hart NP   triamterene-hydroCHLOROthiazide (MAXZIDE) 37.5-25 mg per tablet Take 1 Tablet by mouth daily. 2/14/22  Yes Kota Hart NP   losartan (COZAAR) 25 mg tablet Take 1 Tablet by mouth daily. 2/14/22  Yes Kota Hart NP   rosuvastatin (CRESTOR) 10 mg tablet Take 1 Tablet by mouth nightly. 2/14/22  Yes Kota Hart NP   potassium chloride (K-DUR, KLOR-CON M20) 20 mEq tablet Take 1 tablet by mouth once daily 2/14/22  Yes Kota Hart NP   mepolizumab (Nucala) 100 mg/mL atIn injection 100 mg by SubCUTAneous route every month. Yes Provider, Historical   budesonide/formoterol fumarate (SYMBICORT IN) Take  by inhalation. Yes Provider, Historical   Nebulizer & Compressor machine Use as directed 4/5/17  Yes Kota Hart NP   ipratropium (ATROVENT) 0.02 % nebulizer solution 2.5 mL by Nebulization route as needed for Wheezing (every 6 hours as needed).  4/5/17  Yes Stacey Betancur NP   levocetirizine (XYZAL) 5 mg tablet Take 1 tablet by mouth once daily 11/12/21 2/14/22  Theadore Feast, NP   rosuvastatin (CRESTOR) 10 mg tablet Take 1 tablet by mouth nightly 11/12/21 2/14/22  Stacey Betancur NP   losartan (COZAAR) 25 mg tablet Take 1 tablet by mouth once daily 11/12/21 2/14/22  Amarilis Arana NP   montelukast (SINGULAIR) 10 mg tablet Take 1 tablet by mouth once daily 11/12/21 2/14/22  Amarilis Arana NP   triamterene-hydroCHLOROthiazide (MAXZIDE) 37.5-25 mg per tablet Take 1 tablet by mouth once daily 11/12/21 2/14/22  Amarilis Arana NP   PARoxetine (PAXIL) 20 mg tablet TAKE 1 TABLET BY MOUTH DAILY 10/25/21 2/14/22  Amarilis Arana NP   pantoprazole (PROTONIX) 40 mg tablet TAKE 1 TABLET BY MOUTH DAILY 10/25/21 2/14/22  Amarilis Arana NP   potassium chloride (K-DUR, KLOR-CON) 20 mEq tablet Take 1 tablet by mouth once daily 3/30/21 2/14/22  Amarilis Arana NP     Patient Active Problem List   Diagnosis Code    GERD (gastroesophageal reflux disease) K21.9    Obesity (BMI 30-39. 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 9)     Psychiatric disorder     depression/ not currently taking    Vertigo     Vestibulopathy 7/27/2016     Past Surgical History:   Procedure Laterality Date    COLONOSCOPY N/A 5/22/2018    COLONOSCOPY performed by Ralf Morales MD at [address] [place] [address] COLONOSCOPY N/A 6/2/2020    COLONOSCOPY performed by Mallika Arreaga MD at [address] [place] [address] HX COLONOSCOPY  10/25/2011    1 polyp which was removed, diverticulosis in distal descending colon & sigmoid colon.  HX ENDOSCOPY  2014    HX GYN  1996    hysterectomy    HX ORTHOPAEDIC Left     foot surgery    HX OTHER SURGICAL  2006? L Lower Abd. Basal Cell CA    HX TONSILLECTOMY       Family History   Problem Relation Age of Onset    Hypertension Mother     Cancer Father     Stroke Maternal Grandmother     Cancer Paternal Grandmother         breast cancer    Breast Cancer Paternal Grandmother     Cancer Maternal Grandfather      Social History     Tobacco Use    Smoking status: Former Smoker     Packs/day: 1.00     Years: 18.00     Pack years: 18.00     Types: Cigarettes     Quit date: 1988     Years since quittin.5    Smokeless tobacco: Never Used    Tobacco comment: Quit in    Substance Use Topics    Alcohol use: Yes     Comment: rarely       Review of Systems   Constitutional: Negative for chills, fever, malaise/fatigue and weight loss. HENT: Negative. Eyes: Negative. Respiratory: Negative for cough, hemoptysis, sputum production, shortness of breath and wheezing. Cardiovascular: Negative for chest pain, palpitations, orthopnea, claudication, leg swelling and PND. Gastrointestinal: Negative. Genitourinary: Negative. Musculoskeletal: Negative. Skin: Negative. Neurological: Negative. Endo/Heme/Allergies: Negative. Psychiatric/Behavioral: Negative for depression, hallucinations, memory loss, substance abuse and suicidal ideas. The patient is not nervous/anxious and does not have insomnia.       Lab Results   Component Value Date/Time    Cholesterol, total 131 2021 10:13 AM    HDL Cholesterol 33 2021 10:13 AM    LDL, calculated 54.8 2021 10:13 AM    VLDL, calculated 43.2 2021 10:13 AM    Triglyceride 216 (H) 2021 10:13 AM    CHOL/HDL Ratio 4.0 2021 10:13 AM     Lab Results   Component Value Date/Time    Sodium 141 2021 10:13 AM Potassium 3.4 (L) 03/05/2021 10:13 AM    Chloride 104 03/05/2021 10:13 AM    CO2 29 03/05/2021 10:13 AM    Anion gap 8 03/05/2021 10:13 AM    Glucose 93 03/05/2021 10:13 AM    BUN 11 03/05/2021 10:13 AM    Creatinine 0.84 03/05/2021 10:13 AM    BUN/Creatinine ratio 13 03/05/2021 10:13 AM    GFR est AA >60 03/05/2021 10:13 AM    GFR est non-AA >60 03/05/2021 10:13 AM    Calcium 8.8 03/05/2021 10:13 AM    Bilirubin, total 0.5 03/05/2021 10:13 AM    Alk. phosphatase 81 03/05/2021 10:13 AM    Protein, total 7.1 03/05/2021 10:13 AM    Albumin 3.9 03/05/2021 10:13 AM    Globulin 3.2 03/05/2021 10:13 AM    A-G Ratio 1.2 03/05/2021 10:13 AM    ALT (SGPT) 28 03/05/2021 10:13 AM    AST (SGOT) 21 03/05/2021 10:13 AM       Objective:   No flowsheet data found. General: alert, cooperative, no distress   Mental  status: normal mood, behavior, speech, dress, motor activity, and thought processes, able to follow commands   HENT: NCAT   Neck: no visualized mass   Resp: no respiratory distress   Neuro: no gross deficits   Skin: no discoloration or lesions of concern on visible areas   Psychiatric: normal affect, consistent with stated mood, no evidence of hallucinations     Additional exam findings:   none    We discussed the expected course, resolution and complications of the diagnosis(es) in detail. Medication risks, benefits, costs, interactions, and alternatives were discussed as indicated. I advised her to contact the office if her condition worsens, changes or fails to improve as anticipated. She expressed understanding with the diagnosis(es) and plan. Katrin Hall, was evaluated through a synchronous (real-time) audio-video encounter. The patient (or guardian if applicable) is aware that this is a billable service, which includes applicable co-pays. Verbal consent to proceed has been obtained.  The visit was conducted pursuant to the emergency declaration under the [facility] waiver authority and the [email or web address] Act. Patient identification was verified, and a caregiver was present when appropriate. The patient was located at home in a state where the provider was licensed to provide care.     Shital Saucedo NP  2/14/2022

## 2022-03-08 ENCOUNTER — TELEPHONE (OUTPATIENT)
Dept: ONCOLOGY | Age: 72
End: 2022-03-08

## 2022-03-08 NOTE — TELEPHONE ENCOUNTER
E Pyreos at [facility]  [phone]    03/08/22- Phone call placed to pt to remind pt to have labs drawn prior to her follow up appointment with .  left for patient.

## 2022-03-09 LAB — LDLC SERPL DIRECT ASSAY-MCNC: 65 MG/DL (ref 0–100)

## 2022-03-09 NOTE — PROGRESS NOTES
[facility] at [address] [facility]  Kathleen Grater: [phone]  F: [phone]      Reason for Visit:   Katrin Hall is a 73 y.o. female who is seen for follow up of iron deficiency anemia. History of Present Illness:   She reports progressive fatigue. Intermittent dizziness, feels like she is losing her balance. The dizziness is fairly stable in the past few months. She denies any bleeding. Not on any oral iron, does not tolerate these. Trying to get iron in her diet. No melena. Review of systems was obtained and pertinent findings reviewed above. Past medical history, social history, family history, medications, and allergies are located in the electronic medical record. Physical Exam:     Visit Vitals  LMP 01/01/1996     General: alert, cooperative, no distress   Mental  status: normal mood, behavior, speech, dress, motor activity, and thought processes, able to follow commands   HENT: NCAT   Neck: no visualized mass   Resp: no respiratory distress   Neuro: no gross deficits   Skin: no discoloration or lesions of concern on visible areas   Psychiatric: normal affect, consistent with stated mood, no evidence of hallucinations       Due to this being a TeleHealth evaluation (During IPQLU-33 public health emergency), many elements of the physical examination are unable to be assessed. Evaluation of the following organ systems was limited: Vitals/Constitutional/EENT/Resp/CV/GI//MS/Neuro/Skin/Heme-Lymph-Imm. Results:     Lab Results   Component Value Date/Time    WBC 10.3 03/08/2022 02:56 PM    HGB 12.9 03/08/2022 02:56 PM    HCT 41.9 03/08/2022 02:56 PM    PLATELET 190 35/96/3749 02:56 PM    MCV 82.6 03/08/2022 02:56 PM    ABS.  NEUTROPHILS 8.2 (H) 09/09/2021 03:34 PM    Hemoglobin (POC) 8.8 02/20/2014 11:34 AM    Hemoglobin (POC) 10.2 (L) 10/15/2011 03:12 PM    Hematocrit (POC) 30 (L) 10/15/2011 03:12 PM     Lab Results Component Value Date/Time    Sodium 139 03/08/2022 05:00 PM    Potassium 3.5 03/08/2022 05:00 PM    Chloride 105 03/08/2022 05:00 PM    CO2 28 03/08/2022 05:00 PM    Glucose 133 (H) 03/08/2022 05:00 PM    BUN 13 03/08/2022 05:00 PM    Creatinine 0.84 03/08/2022 05:00 PM    GFR est AA >60 03/08/2022 05:00 PM    GFR est non-AA >60 03/08/2022 05:00 PM    Calcium 9.1 03/08/2022 05:00 PM    Sodium (POC) 141 10/15/2011 03:12 PM    Potassium (POC) 3.5 10/15/2011 03:12 PM    Chloride (POC) 104 10/15/2011 03:12 PM    Glucose (POC) 83 07/26/2016 12:03 PM    Glucose POC 90 12/05/2016 11:08 AM    BUN (POC) 10 10/15/2011 03:12 PM    Creatinine (POC) 1.1 10/15/2011 03:12 PM    Calcium, ionized (POC) 1.11 (L) 10/15/2011 03:12 PM     Lab Results   Component Value Date/Time    Bilirubin, total 0.3 03/08/2022 05:00 PM    Alk. phosphatase 74 03/08/2022 05:00 PM    Protein, total 7.3 03/08/2022 05:00 PM    Albumin 3.9 03/08/2022 05:00 PM    Globulin 3.4 03/08/2022 05:00 PM    A-G Ratio 1.1 03/08/2022 05:00 PM       Lab Results   Component Value Date/Time    Reticulocyte count 1.9 02/20/2014 10:50 AM    Iron % saturation 8 (L) 03/08/2022 02:56 PM    TIBC 418 03/08/2022 02:56 PM    Ferritin 7 (L) 03/08/2022 02:56 PM    Vitamin B12 380 08/21/2012 09:45 AM    Folate 15.5 08/21/2012 09:45 AM    Haptoglobin 101 02/20/2014 10:50 AM     (H) 12/08/2011 02:38 PM    Sed rate (ESR) 5 05/05/2014 10:40 AM    TSH 1.88 03/08/2022 05:00 PM    INR 1.0 10/15/2011 03:07 PM    INR (POC) 1.0 07/26/2016 12:07 PM    Lipase 70 (L) 05/10/2012 12:30 PM    Hep C virus Ab Interp.  NONREACTIVE 03/08/2022 05:00 PM     HGB (g/dL)   Date Value   03/08/2022 12.9   09/09/2021 15.0   03/05/2021 15.0   01/26/2021 15.4   11/06/2020 9.3 (L)   10/27/2020 9.4 (L)   02/07/2020 11.4 (L)   10/25/2019 13.0   10/26/2018 15.1   09/25/2018 16.2 (H)   04/25/2018 15.6   01/16/2018 11.1   07/17/2017 12.3   04/17/2017 13.8   01/05/2017 15.1   07/27/2016 13.0   07/26/2016 13.9 07/15/2016 13.7   05/03/2016 9.0 (L)   12/07/2015 12.3   06/03/2015 14.4   03/17/2015 14.9   12/08/2014 16.4 (H)   06/11/2014 15.5   05/05/2014 14.0   04/11/2014 13.4   03/05/2014 9.2 (L)   02/18/2014 8.5 (L)   04/09/2013 12.8   07/16/2012 11.6   05/11/2012 11.9   05/10/2012 13.2   01/19/2012 14.0   12/08/2011 13.6   10/15/2011 8.7 (L)   09/29/2011 8.5 (L)       Ferritin   Date Value   03/08/2022 7 NG/ML (L)   09/09/2021 14 NG/ML   01/26/2021 25 NG/ML (L)   11/06/2020 4 NG/ML (L)   10/27/2020 4 NG/ML (L)   10/25/2019 12 ng/mL (L)   10/26/2018 29 ng/mL   04/25/2018 72 ng/mL   01/16/2018 8 ng/mL (L)   07/17/2017 12 ng/mL (L)   01/05/2017 24 ng/mL   07/15/2016 18 ng/mL   05/03/2016 4 ng/mL (L)   12/07/2015 10 ng/mL (L)   06/03/2015 17 ng/mL   12/08/2014 79 ng/mL   06/11/2014 40 NG/ML   04/11/2014 19 NG/ML   02/20/2014 5 ng/mL (L)   10/07/2011 5 ng/mL (L)       Stool Blood 2/24/2014: negative  Celiac panel 12/8/2014: negative    EGD and Colonscopy with Dr. Micah Garcia 4/9/2014: severe diverticulosis, nonbleeding  Capsule study 4/30/2014: normal    EGD and Colonoscopy with Dr. Imagene Runner 6/16/2016: large cecum AVM, one polyp (tubular adenoma), otherwise normal.  Capsule endoscopy 6/30/2016: normal    EGD and Colonoscopy with Dr. Imagene Runner 5/22/2018: Normal esophagus, stomach, duodenum, jejunum. AVM in colon treated. EGD and Colonoscopy with Dr. Imagene Runner 6/2/2020:  EGD:  Esophageal ring, acquired. Probably related to GERD. Colonoscopy: Colon polyps, diverticulosis and right colon AVM. Assessment:   1) Anemia, Iron Deficiency  Recurrent. S/p feraheme in 5/2016 and 7/2016, and injectafer in 2/2018 and 11/2020. Anemia resolves with IV iron, but subsequently recurs. She cannot tolerate oral iron. Her HGB remains normal at 12.9, though drifting down from 15.0 last check. Iron levels have fallen as well, now with significant iron deficiency. We discussed the option of repeat IV iron now versus continued surveillance.   As she is increasingly symptomatic, and given her prior pattern of recurring iron deficiency anemia, she would like to proceed with treatment now. The source of her iron deficiency is likely recurrent AVMs. Celiac panel was negative, and no reason to suggest another malabsorption issue. No evidence of bleeding from elsewhere. She is at risk for recurrence in the future, so we will monitor. 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 She is at risk for recurrence. Monitor. 3) COPD  Follows with pulmonary. Receiving an injection once a month which has helped her symptoms quite a bit. 4) Dizziness  Uncertain etiology. I'm not convinced it is related to her iron deficiency, given that she has no anemia. However, we will see if it improves with IV iron. I also advised her to check her BP and HR next time she has a spell, as she has a history of bradycardia. Consider follow up with PCP or referral to ENT. Plan:     Injectafer 750mg IV weekly for 2 doses  Labs in 6 months: CBC, iron profile, ferritin [employer])  Return to see me in 6 months    Genie Garcia for video visits. Signed By: Herb Fagan MD      The patient was evaluated through a synchronous (real-time) audio-video encounter. The patient (or guardian if applicable) is aware that this is a billable service. Verbal consent to proceed has been obtained within the past 12 months. The visit was conducted pursuant to the emergency declaration under the [facility], [address] [place] [address] authority and the [employer] General Act. Patient identification was verified, and a caregiver was present when appropriate. The patient was located in a state where the provider was credentialed to provide care.

## 2022-03-17 ENCOUNTER — VIRTUAL VISIT (OUTPATIENT)
Dept: ONCOLOGY | Age: 72
End: 2022-03-17
Payer: MEDICARE

## 2022-03-17 DIAGNOSIS — D50.0 IRON DEFICIENCY ANEMIA DUE TO CHRONIC BLOOD LOSS: Primary | ICD-10-CM

## 2022-03-17 PROCEDURE — 99214 OFFICE O/P EST MOD 30 MIN: CPT | Performed by: INTERNAL MEDICINE

## 2022-03-17 PROCEDURE — G9899 SCRN MAM PERF RSLTS DOC: HCPCS | Performed by: INTERNAL MEDICINE

## 2022-03-17 PROCEDURE — 3017F COLORECTAL CA SCREEN DOC REV: CPT | Performed by: INTERNAL MEDICINE

## 2022-03-17 PROCEDURE — 1090F PRES/ABSN URINE INCON ASSESS: CPT | Performed by: INTERNAL MEDICINE

## 2022-03-17 PROCEDURE — G8399 PT W/DXA RESULTS DOCUMENT: HCPCS | Performed by: INTERNAL MEDICINE

## 2022-03-17 PROCEDURE — 1101F PT FALLS ASSESS-DOCD LE1/YR: CPT | Performed by: INTERNAL MEDICINE

## 2022-03-17 PROCEDURE — G8421 BMI NOT CALCULATED: HCPCS | Performed by: INTERNAL MEDICINE

## 2022-03-17 PROCEDURE — G8536 NO DOC ELDER MAL SCRN: HCPCS | Performed by: INTERNAL MEDICINE

## 2022-03-17 PROCEDURE — G8432 DEP SCR NOT DOC, RNG: HCPCS | Performed by: INTERNAL MEDICINE

## 2022-03-17 PROCEDURE — G8756 NO BP MEASURE DOC: HCPCS | Performed by: INTERNAL MEDICINE

## 2022-03-17 PROCEDURE — G8427 DOCREV CUR MEDS BY ELIG CLIN: HCPCS | Performed by: INTERNAL MEDICINE

## 2022-03-17 PROCEDURE — G0463 HOSPITAL OUTPT CLINIC VISIT: HCPCS | Performed by: INTERNAL MEDICINE

## 2022-03-17 RX ORDER — SODIUM CHLORIDE 9 MG/ML
10 INJECTION INTRAMUSCULAR; INTRAVENOUS; SUBCUTANEOUS AS NEEDED
Status: CANCELLED | OUTPATIENT
Start: 2022-04-06

## 2022-03-17 RX ORDER — EPINEPHRINE 1 MG/ML
0.3 INJECTION, SOLUTION, CONCENTRATE INTRAVENOUS AS NEEDED
Status: CANCELLED | OUTPATIENT
Start: 2022-04-06

## 2022-03-17 RX ORDER — HYDROCORTISONE SODIUM SUCCINATE 100 MG/2ML
100 INJECTION, POWDER, FOR SOLUTION INTRAMUSCULAR; INTRAVENOUS AS NEEDED
OUTPATIENT
Start: 2022-04-12

## 2022-03-17 RX ORDER — HEPARIN 100 UNIT/ML
300-500 SYRINGE INTRAVENOUS AS NEEDED
Status: CANCELLED | OUTPATIENT
Start: 2022-04-06

## 2022-03-17 RX ORDER — SODIUM CHLORIDE 9 MG/ML
10 INJECTION INTRAMUSCULAR; INTRAVENOUS; SUBCUTANEOUS AS NEEDED
OUTPATIENT
Start: 2022-04-12

## 2022-03-17 RX ORDER — SODIUM CHLORIDE 0.9 % (FLUSH) 0.9 %
10 SYRINGE (ML) INJECTION AS NEEDED
OUTPATIENT
Start: 2022-04-12

## 2022-03-17 RX ORDER — DIPHENHYDRAMINE HYDROCHLORIDE 50 MG/ML
25 INJECTION, SOLUTION INTRAMUSCULAR; INTRAVENOUS AS NEEDED
Status: CANCELLED
Start: 2022-04-06

## 2022-03-17 RX ORDER — ONDANSETRON 2 MG/ML
8 INJECTION INTRAMUSCULAR; INTRAVENOUS AS NEEDED
Status: CANCELLED | OUTPATIENT
Start: 2022-04-06

## 2022-03-17 RX ORDER — DIPHENHYDRAMINE HYDROCHLORIDE 50 MG/ML
50 INJECTION, SOLUTION INTRAMUSCULAR; INTRAVENOUS AS NEEDED
Start: 2022-04-12

## 2022-03-17 RX ORDER — SODIUM CHLORIDE 0.9 % (FLUSH) 0.9 %
10 SYRINGE (ML) INJECTION AS NEEDED
Status: CANCELLED | OUTPATIENT
Start: 2022-04-06

## 2022-03-17 RX ORDER — HEPARIN 100 UNIT/ML
300-500 SYRINGE INTRAVENOUS AS NEEDED
OUTPATIENT
Start: 2022-04-12

## 2022-03-17 RX ORDER — ONDANSETRON 2 MG/ML
8 INJECTION INTRAMUSCULAR; INTRAVENOUS AS NEEDED
OUTPATIENT
Start: 2022-04-12

## 2022-03-17 RX ORDER — HYDROCORTISONE SODIUM SUCCINATE 100 MG/2ML
100 INJECTION, POWDER, FOR SOLUTION INTRAMUSCULAR; INTRAVENOUS AS NEEDED
Status: CANCELLED | OUTPATIENT
Start: 2022-04-06

## 2022-03-17 RX ORDER — DIPHENHYDRAMINE HYDROCHLORIDE 50 MG/ML
25 INJECTION, SOLUTION INTRAMUSCULAR; INTRAVENOUS AS NEEDED
Start: 2022-04-12

## 2022-03-17 RX ORDER — EPINEPHRINE 1 MG/ML
0.3 INJECTION, SOLUTION, CONCENTRATE INTRAVENOUS AS NEEDED
OUTPATIENT
Start: 2022-04-12

## 2022-03-17 RX ORDER — ALBUTEROL SULFATE 0.83 MG/ML
2.5 SOLUTION RESPIRATORY (INHALATION) AS NEEDED
Start: 2022-04-12

## 2022-03-17 RX ORDER — ACETAMINOPHEN 325 MG/1
650 TABLET ORAL AS NEEDED
Status: CANCELLED
Start: 2022-04-06

## 2022-03-17 RX ORDER — ALBUTEROL SULFATE 0.83 MG/ML
2.5 SOLUTION RESPIRATORY (INHALATION) AS NEEDED
Status: CANCELLED
Start: 2022-04-06

## 2022-03-17 RX ORDER — ACETAMINOPHEN 325 MG/1
650 TABLET ORAL AS NEEDED
Start: 2022-04-12

## 2022-03-17 RX ORDER — DIPHENHYDRAMINE HYDROCHLORIDE 50 MG/ML
50 INJECTION, SOLUTION INTRAMUSCULAR; INTRAVENOUS AS NEEDED
Status: CANCELLED
Start: 2022-04-06

## 2022-03-17 NOTE — PATIENT INSTRUCTIONS
Thank you for participating in the virtual visit with Dr. Janes Almanza. We will call you soon to schedule a follow up appointment with us in 6 months. If you do not hear from us, please call us at [phone] to schedule your appointment. Please use the enclosed lab slip to have your labs done before your next appointment.

## 2022-03-17 NOTE — PROGRESS NOTES
Cas Shay is a 73 y.o. female follow up for anemia. 1. Have you been to the ER, urgent care clinic since your last visit? Hospitalized since your last visit?no     2. Have you seen or consulted any other health care providers outside of the [address] [place] [address] since your last visit? Include any pap smears or colon screening.  no

## 2022-03-19 PROBLEM — J30.89 NON-SEASONAL ALLERGIC RHINITIS: Status: ACTIVE | Noted: 2020-01-27

## 2022-03-19 PROBLEM — F41.1 GAD (GENERALIZED ANXIETY DISORDER): Status: ACTIVE | Noted: 2020-01-27

## 2022-03-19 PROBLEM — J45.50 SEVERE PERSISTENT ASTHMA WITHOUT COMPLICATION: Status: ACTIVE | Noted: 2020-01-27

## 2022-03-19 PROBLEM — D50.0 IRON DEFICIENCY ANEMIA DUE TO CHRONIC BLOOD LOSS: Status: ACTIVE | Noted: 2017-01-12

## 2022-03-20 PROBLEM — K55.21 AVM (ARTERIOVENOUS MALFORMATION) OF COLON WITH HEMORRHAGE: Status: ACTIVE | Noted: 2018-01-31

## 2022-03-25 ENCOUNTER — TELEPHONE (OUTPATIENT)
Dept: ONCOLOGY | Age: 72
End: 2022-03-25

## 2022-03-25 NOTE — TELEPHONE ENCOUNTER
Patient called to find out about her infusion appt. She is due for injectafers based on her 3/17 appt. Patient states that she has not heard from our office. Reassured patient that we will leave a message for the . She should be calling on Monday. Patient was understanding and will look forward to the call.  CB# for scheduling of her injectafer to have next week per Dr. Sherryle Carte is [phone]

## 2022-04-06 ENCOUNTER — HOSPITAL ENCOUNTER (OUTPATIENT)
Dept: INFUSION THERAPY | Age: 72
Discharge: HOME | End: 2022-04-06
Payer: MEDICARE

## 2022-04-06 VITALS
OXYGEN SATURATION: 98 % | DIASTOLIC BLOOD PRESSURE: 58 MMHG | TEMPERATURE: 97.8 F | HEART RATE: 69 BPM | SYSTOLIC BLOOD PRESSURE: 113 MMHG | RESPIRATION RATE: 18 BRPM

## 2022-04-06 DIAGNOSIS — D50.0 IRON DEFICIENCY ANEMIA DUE TO CHRONIC BLOOD LOSS: Primary | ICD-10-CM

## 2022-04-06 PROCEDURE — 96365 THER/PROPH/DIAG IV INF INIT: CPT

## 2022-04-06 PROCEDURE — 74011250636 HC RX REV CODE- 250/636: Performed by: NURSE PRACTITIONER

## 2022-04-06 RX ORDER — SODIUM CHLORIDE 9 MG/ML
10 INJECTION INTRAMUSCULAR; INTRAVENOUS; SUBCUTANEOUS AS NEEDED
Status: DISCONTINUED | OUTPATIENT
Start: 2022-04-06 | End: 2022-04-07 | Stop reason: HOSPADM

## 2022-04-06 RX ORDER — HEPARIN 100 UNIT/ML
300-500 SYRINGE INTRAVENOUS AS NEEDED
Status: DISCONTINUED | OUTPATIENT
Start: 2022-04-06 | End: 2022-04-07 | Stop reason: HOSPADM

## 2022-04-06 RX ORDER — SODIUM CHLORIDE 0.9 % (FLUSH) 0.9 %
10 SYRINGE (ML) INJECTION AS NEEDED
Status: DISCONTINUED | OUTPATIENT
Start: 2022-04-06 | End: 2022-04-07 | Stop reason: HOSPADM

## 2022-04-06 RX ADMIN — FERRIC CARBOXYMALTOSE INJECTION 750 MG: 50 INJECTION, SOLUTION INTRAVENOUS at 16:10

## 2022-04-06 NOTE — PROGRESS NOTES
[facility] Progress Note    Date: 2022    Name: Charmaine Reyes    MRN: [id number]         : 1950    Ms. Marley Riley Arrived ambulatory and in no distress for Injectafer Infusion. Assessment was completed, no acute issues at this time. 24 G PIV established to left arm, + blood return. Ms. Julius Oakley vitals were reviewed. Visit Vitals  BP (!) 113/58   Pulse 69   Temp 97.8 °F (36.6 °C)   Resp 18   SpO2 98%   Breastfeeding No       Medications:  Medications Administered     ferric carboxymaltose (INJECTAFER) 750 mg in 0.9% sodium chloride 250 mL, overfill volume 25 mL IVPB     Admin Date  2022 Action  New Bag Dose  750 mg Rate  870 mL/hr Route  IntraVENous Administered By  Meeta Nicholas RN                Ms. Marley Riley tolerated treatment well and was discharged from [place] [address] in stable condition. PIV flushed & removed. She is aware of future appointments.     Future Appointments   Date Time Provider Garcia Ruiz   2022  3:00 PM SS INF2 CH4 <2H [facility]   2022  8:00 AM Hernandez Nicholson NP [facility] BS AMB   2022 10:30 AM Kailee Azul MD [facility] BS JOSY Armenta RN  2022

## 2022-04-12 ENCOUNTER — HOSPITAL ENCOUNTER (OUTPATIENT)
Dept: INFUSION THERAPY | Age: 72
Discharge: HOME | End: 2022-04-12
Payer: MEDICARE

## 2022-04-12 VITALS
TEMPERATURE: 98 F | HEART RATE: 82 BPM | OXYGEN SATURATION: 97 % | RESPIRATION RATE: 18 BRPM | DIASTOLIC BLOOD PRESSURE: 55 MMHG | SYSTOLIC BLOOD PRESSURE: 120 MMHG

## 2022-04-12 DIAGNOSIS — D50.0 IRON DEFICIENCY ANEMIA DUE TO CHRONIC BLOOD LOSS: Primary | ICD-10-CM

## 2022-04-12 PROCEDURE — 74011250636 HC RX REV CODE- 250/636: Performed by: NURSE PRACTITIONER

## 2022-04-12 PROCEDURE — 96365 THER/PROPH/DIAG IV INF INIT: CPT

## 2022-04-12 RX ADMIN — FERRIC CARBOXYMALTOSE INJECTION 750 MG: 50 INJECTION, SOLUTION INTRAVENOUS at 14:45

## 2022-04-12 NOTE — PROGRESS NOTES
hospitals Progress Note    Date: 2022    Name: Fior Parekh    MRN: [id number]         : 1950    Ms. Keri Peter Arrived ambulatory and in no distress for Dose 2 of 2  for Injectafer. Assessment was completed, no acute issues at this time, no new complaints voiced. 24 G PIV established to Right hand without difficulty. Ms. Greg Lee vitals were reviewed. Visit Vitals  BP (!) 120/55   Pulse 82   Temp 98 °F (36.7 °C)   Resp 18   SpO2 97%       Medications:  Medications Administered     ferric carboxymaltose (INJECTAFER) 750 mg in 0.9% sodium chloride 250 mL, overfill volume 25 mL IVPB     Admin Date  2022 Action  New Bag Dose  750 mg Rate  870 mL/hr Route  IntraVENous Administered By  Gregorio Ghotra RN                Ms. Keri Peter tolerated treatment well and was discharged from [place] [address] in stable condition . PIV flushed & removed. Discharge instructions reviewed with patient, verbalized understanding. Patient politely declined to stay 30 minutes post infusion for monitoring. Patient to follow-up with the provider regarding future appointments.      Ross Hager RN  2022

## 2022-04-13 ENCOUNTER — HOSPITAL ENCOUNTER (OUTPATIENT)
Dept: INFUSION THERAPY | Age: 72
End: 2022-04-13

## 2022-04-20 DIAGNOSIS — F41.1 GAD (GENERALIZED ANXIETY DISORDER): ICD-10-CM

## 2022-04-21 RX ORDER — PAROXETINE HYDROCHLORIDE 20 MG/1
20 TABLET, FILM COATED ORAL DAILY
Qty: 90 TABLET | Refills: 3 | Status: SHIPPED | OUTPATIENT
Start: 2022-04-21

## 2022-04-21 RX ORDER — PANTOPRAZOLE SODIUM 40 MG/1
40 TABLET, DELAYED RELEASE ORAL DAILY
Qty: 90 TABLET | Refills: 3 | Status: SHIPPED | OUTPATIENT
Start: 2022-04-21

## 2022-08-19 ENCOUNTER — OFFICE VISIT (OUTPATIENT)
Dept: FAMILY MEDICINE CLINIC | Age: 72
End: 2022-08-19
Payer: MEDICARE

## 2022-08-19 VITALS
HEART RATE: 63 BPM | SYSTOLIC BLOOD PRESSURE: 126 MMHG | HEIGHT: 64 IN | DIASTOLIC BLOOD PRESSURE: 87 MMHG | OXYGEN SATURATION: 95 % | RESPIRATION RATE: 14 BRPM | BODY MASS INDEX: 37.32 KG/M2 | TEMPERATURE: 98.4 F | WEIGHT: 218.6 LBS

## 2022-08-19 DIAGNOSIS — I10 ESSENTIAL HYPERTENSION WITH GOAL BLOOD PRESSURE LESS THAN 130/80: ICD-10-CM

## 2022-08-19 DIAGNOSIS — M79.671 PAIN OF RIGHT HEEL: Primary | ICD-10-CM

## 2022-08-19 PROCEDURE — 1090F PRES/ABSN URINE INCON ASSESS: CPT | Performed by: NURSE PRACTITIONER

## 2022-08-19 PROCEDURE — G8536 NO DOC ELDER MAL SCRN: HCPCS | Performed by: NURSE PRACTITIONER

## 2022-08-19 PROCEDURE — 1101F PT FALLS ASSESS-DOCD LE1/YR: CPT | Performed by: NURSE PRACTITIONER

## 2022-08-19 PROCEDURE — G8754 DIAS BP LESS 90: HCPCS | Performed by: NURSE PRACTITIONER

## 2022-08-19 PROCEDURE — G8427 DOCREV CUR MEDS BY ELIG CLIN: HCPCS | Performed by: NURSE PRACTITIONER

## 2022-08-19 PROCEDURE — G0463 HOSPITAL OUTPT CLINIC VISIT: HCPCS | Performed by: NURSE PRACTITIONER

## 2022-08-19 PROCEDURE — 3017F COLORECTAL CA SCREEN DOC REV: CPT | Performed by: NURSE PRACTITIONER

## 2022-08-19 PROCEDURE — G8417 CALC BMI ABV UP PARAM F/U: HCPCS | Performed by: NURSE PRACTITIONER

## 2022-08-19 PROCEDURE — G8432 DEP SCR NOT DOC, RNG: HCPCS | Performed by: NURSE PRACTITIONER

## 2022-08-19 PROCEDURE — 1123F ACP DISCUSS/DSCN MKR DOCD: CPT | Performed by: NURSE PRACTITIONER

## 2022-08-19 PROCEDURE — G8752 SYS BP LESS 140: HCPCS | Performed by: NURSE PRACTITIONER

## 2022-08-19 PROCEDURE — G8399 PT W/DXA RESULTS DOCUMENT: HCPCS | Performed by: NURSE PRACTITIONER

## 2022-08-19 PROCEDURE — 99214 OFFICE O/P EST MOD 30 MIN: CPT | Performed by: NURSE PRACTITIONER

## 2022-08-19 PROCEDURE — G9899 SCRN MAM PERF RSLTS DOC: HCPCS | Performed by: NURSE PRACTITIONER

## 2022-08-19 RX ORDER — DICLOFENAC SODIUM 10 MG/G
2 GEL TOPICAL
Qty: 200 G | Refills: 1 | Status: SHIPPED | OUTPATIENT
Start: 2022-08-19

## 2022-08-19 NOTE — PROGRESS NOTES
Joana Donahue (: 1950) is a 81 y.o. female, established patient, here for evaluation of the following chief complaint(s): Foot Pain (Right Foot Pain X this week. Pt stated that she took Tylenol as needed. No Injury )       ASSESSMENT/PLAN:  Below is the assessment and plan developed based on review of pertinent history, physical exam, labs, studies, and medications. 1. Pain of right heel  Suspect this is related to repetitive strain of getting up and down quickly with a toddler for the past week  Recommended supportive footwear such as athletic or running shoes to help dissipate force away from foot and heel  Add diclofenac gel, ice prn  -     diclofenac (VOLTAREN) 1 % gel; Apply 2 g to affected area four (4) times daily as needed for Pain., Normal, Disp-200 g, R-1    2. Essential Hypertension  At goal   Continue maxzide, losartan at current doses  Low sodium diet recommended   Weight loss recommended   150 minutes exercise weekly recommended    Return in about 6 months (around 2023) for medicare wellness visit. I have discussed the diagnosis with the patient and the intended plan as seen in the above orders. The patient has received an after-visit summary and questions were answered concerning future plans. Patient conveyed understanding of the plan at the time of the visit. SUBJECTIVE/OBJECTIVE:  Presents for evaluation of right foot pain. Complains of 3 to 4-day history of right foot and ankle pain, localized to the back of the heel and ankle, extending into the lower leg at times. Symptoms improve overnight and then returned over the course of the day. Tylenol did provide some relief. Rest also improved symptoms. She can recall no injury or trauma to the affected area. She does note she has been watching her toddler granddaughter this week and has been getting up and down from a seated position much more frequently. She usually wears sandals on her feet.   Has noticed some slight swelling in the right heel and ankle, but no redness, increased joint warmth, or bruising. Diet and Lifestyle: generally follows a low fat low cholesterol diet, generally follows a low sodium diet, exercises sporadically, nonsmoker  Home BP Monitoring: is not measured at home    Cardiovascular ROS: taking medications as instructed, no medication side effects noted, no TIA's, no chest pain on exertion, no dyspnea on exertion, no swelling of ankles, no orthostatic dizziness or lightheadedness, no orthopnea or paroxysmal nocturnal dyspnea, no palpitations, no intermittent claudication symptoms. Past Medical History:   Diagnosis Date    Anemia     Arthritis     knee right    Asthma     Bradycardia 7/26/2016    Chronic obstructive pulmonary disease (HCC)     GERD (gastroesophageal reflux disease)     Hypercholesterolemia     Hypertension     Obesity (BMI 30-39. 9)     Psychiatric disorder     depression/ not currently taking    Vertigo     Vestibulopathy 7/27/2016     Past Surgical History:   Procedure Laterality Date    COLONOSCOPY N/A 5/22/2018    COLONOSCOPY performed by Ned Soria MD at [facility] N/A 6/2/2020    COLONOSCOPY performed by Ida Ba MD at [address] [place] [address] 10    HX COLONOSCOPY  10/25/2011    1 polyp which was removed, diverticulosis in distal descending colon & sigmoid colon. HX ENDOSCOPY  04/2014    HX GYN  1996    hysterectomy    HX ORTHOPAEDIC Left     foot surgery    HX OTHER SURGICAL  2006? L Lower Abd.  Basal Cell CA    HX TONSILLECTOMY  1958     Family History   Problem Relation Age of Onset    Hypertension Mother     Cancer Father     Stroke Maternal Grandmother     Cancer Paternal Grandmother         breast cancer    Breast Cancer Paternal Grandmother     Cancer Maternal Grandfather      Social History     Tobacco Use    Smoking status: Former     Packs/day: 1.00     Years: 18.00     Pack years: 18.00     Types: Cigarettes     Quit date: 7/24/1988 Years since quittin.0    Smokeless tobacco: Never    Tobacco comments:     Quit in    Substance Use Topics    Alcohol use: Yes     Comment: rarely       Review of Systems   Constitutional: Negative. HENT: Negative. Eyes: Negative. Respiratory: Negative. Cardiovascular: Negative. Gastrointestinal: Negative. Endocrine: Negative. Genitourinary: Negative. Musculoskeletal:  Positive for arthralgias and joint swelling. Skin: Negative. Allergic/Immunologic: Negative. Neurological: Negative. Hematological: Negative. Psychiatric/Behavioral: Negative. Visit Vitals  /87 (BP 1 Location: Left upper arm, BP Patient Position: Sitting, BP Cuff Size: Adult)   Pulse 63   Temp 98.4 °F (36.9 °C) (Oral)   Resp 14   Ht 5' 4\" (1.626 m)   Wt 218 lb 9.6 oz (99.2 kg)   SpO2 95%   BMI 37.52 kg/m²     Physical Examination: General appearance - alert, well appearing, and in no distress and oriented to person, place, and time  Mental status - normal mood, behavior, speech, dress, motor activity, and thought processes  Eyes - sclera anicteric  Neck - supple, no significant adenopathy, thyroid exam: thyroid is normal in size without nodules or tenderness  Chest - clear to auscultation, no wheezes, rales or rhonchi, symmetric air entry  Heart - normal rate, regular rhythm, normal S1, S2, no murmurs, rubs, clicks or gallops, normal bilateral carotid upstroke without bruits, no JVD  Abdomen - soft, nontender, nondistended, no masses or organomegaly  bowel sounds normal  Neurological - alert, oriented, normal speech, no focal findings or movement disorder noted  Extremities - no pedal edema noted, intact peripheral pulses, no edema, redness or tenderness in the calves or thighs  No tenderness of R achilles. No localized tenderness or swelling of R foot, heel, or ankle. No erythema or ecchymosis of R foot or ankle.   Skin - normal coloration and turgor, no rashes      An electronic signature was used to authenticate this note.   -- Paola Alonzo, NP

## 2022-08-19 NOTE — PROGRESS NOTES
Pavithra Casas is a 81 y.o. female      Chief Complaint   Patient presents with    Foot Pain     Right Foot Pain X this week. Pt stated that she took Tylenol as needed. No Injury      1. Have you been to the ER, urgent care clinic since your last visit? Hospitalized since your last visit? No    2. Have you seen or consulted any other health care providers outside of the [address] [place] [address] since your last visit? Include any pap smears or colon screening.  No

## 2022-09-11 NOTE — PROGRESS NOTES
[facility] [place] [facility] [place] [address] [facility] [address] [facility]  Estrellita Gals: [phone]  F: [phone]      Reason for Visit:   Saritha Souza is a 81 y.o. female who is seen for follow up of iron deficiency anemia. History of Present Illness:   She had two additional doses of injectafer in 4/2022, tolerated these well. Energy improved a little bit after this, but has subsequently dropped. Dizziness overall improved. No bleeding, no melena. Review of systems was obtained and pertinent findings reviewed above. Past medical history, social history, family history, medications, and allergies are located in the electronic medical record. Physical Exam:     Visit Vitals  LMP 01/01/1996     General: alert, cooperative, no distress   Mental  status: normal mood, behavior, speech, dress, motor activity, and thought processes, able to follow commands   HENT: NCAT   Neck: no visualized mass   Resp: no respiratory distress   Neuro: no gross deficits   Skin: no discoloration or lesions of concern on visible areas   Psychiatric: normal affect, consistent with stated mood, no evidence of hallucinations       Due to this being a TeleHealth evaluation (During HBRXW-72 public health emergency), many elements of the physical examination are unable to be assessed. Evaluation of the following organ systems was limited: Vitals/Constitutional/EENT/Resp/CV/GI//MS/Neuro/Skin/Heme-Lymph-Imm. Results:     Lab Results   Component Value Date/Time    WBC 9.8 09/08/2022 01:50 PM    HGB 16.6 (H) 09/08/2022 01:50 PM    HCT 47.3 (H) 09/08/2022 01:50 PM    PLATELET 278 74/09/7206 01:50 PM    MCV 87.6 09/08/2022 01:50 PM    ABS.  NEUTROPHILS 6.6 09/08/2022 01:50 PM    Hemoglobin (POC) 8.8 02/20/2014 11:34 AM    Hemoglobin (POC) 10.2 (L) 10/15/2011 03:12 PM    Hematocrit (POC) 30 (L) 10/15/2011 03:12 PM     Lab Results   Component Value Date/Time    Sodium 139 03/08/2022 05:00 PM Potassium 3.5 03/08/2022 05:00 PM    Chloride 105 03/08/2022 05:00 PM    CO2 28 03/08/2022 05:00 PM    Glucose 133 (H) 03/08/2022 05:00 PM    BUN 13 03/08/2022 05:00 PM    Creatinine 0.84 03/08/2022 05:00 PM    GFR est AA >60 03/08/2022 05:00 PM    GFR est non-AA >60 03/08/2022 05:00 PM    Calcium 9.1 03/08/2022 05:00 PM    Sodium (POC) 141 10/15/2011 03:12 PM    Potassium (POC) 3.5 10/15/2011 03:12 PM    Chloride (POC) 104 10/15/2011 03:12 PM    Glucose (POC) 83 07/26/2016 12:03 PM    Glucose POC 90 12/05/2016 11:08 AM    BUN (POC) 10 10/15/2011 03:12 PM    Creatinine (POC) 1.1 10/15/2011 03:12 PM    Calcium, ionized (POC) 1.11 (L) 10/15/2011 03:12 PM     Lab Results   Component Value Date/Time    Bilirubin, total 0.3 03/08/2022 05:00 PM    Alk. phosphatase 74 03/08/2022 05:00 PM    Protein, total 7.3 03/08/2022 05:00 PM    Albumin 3.9 03/08/2022 05:00 PM    Globulin 3.4 03/08/2022 05:00 PM    A-G Ratio 1.1 03/08/2022 05:00 PM       Lab Results   Component Value Date/Time    Reticulocyte count 1.9 02/20/2014 10:50 AM    Iron % saturation 23 09/08/2022 01:50 PM    TIBC 363 09/08/2022 01:50 PM    Ferritin 46 09/08/2022 01:50 PM    Vitamin B12 380 08/21/2012 09:45 AM    Folate 15.5 08/21/2012 09:45 AM    Haptoglobin 101 02/20/2014 10:50 AM     (H) 12/08/2011 02:38 PM    Sed rate (ESR) 5 05/05/2014 10:40 AM    TSH 1.88 03/08/2022 05:00 PM    INR 1.0 10/15/2011 03:07 PM    INR (POC) 1.0 07/26/2016 12:07 PM    Lipase 70 (L) 05/10/2012 12:30 PM    Hep C virus Ab Interp.  NONREACTIVE 03/08/2022 05:00 PM     HGB (g/dL)   Date Value   09/08/2022 16.6 (H)   03/08/2022 12.9   09/09/2021 15.0   03/05/2021 15.0   01/26/2021 15.4   11/06/2020 9.3 (L)   10/27/2020 9.4 (L)   02/07/2020 11.4 (L)   10/25/2019 13.0   10/26/2018 15.1   09/25/2018 16.2 (H)   04/25/2018 15.6   01/16/2018 11.1   07/17/2017 12.3   04/17/2017 13.8   01/05/2017 15.1   07/27/2016 13.0   07/26/2016 13.9   07/15/2016 13.7   05/03/2016 9.0 (L)   12/07/2015 12.3   06/03/2015 14.4   03/17/2015 14.9   12/08/2014 16.4 (H)   06/11/2014 15.5   05/05/2014 14.0   04/11/2014 13.4   03/05/2014 9.2 (L)   02/18/2014 8.5 (L)   04/09/2013 12.8   07/16/2012 11.6   05/11/2012 11.9   05/10/2012 13.2   01/19/2012 14.0   12/08/2011 13.6   10/15/2011 8.7 (L)   09/29/2011 8.5 (L)       Ferritin   Date Value   09/08/2022 46 NG/ML   03/08/2022 7 NG/ML (L)   09/09/2021 14 NG/ML   01/26/2021 25 NG/ML (L)   11/06/2020 4 NG/ML (L)   10/27/2020 4 NG/ML (L)   10/25/2019 12 ng/mL (L)   10/26/2018 29 ng/mL   04/25/2018 72 ng/mL   01/16/2018 8 ng/mL (L)   07/17/2017 12 ng/mL (L)   01/05/2017 24 ng/mL   07/15/2016 18 ng/mL   05/03/2016 4 ng/mL (L)   12/07/2015 10 ng/mL (L)   06/03/2015 17 ng/mL   12/08/2014 79 ng/mL   06/11/2014 40 NG/ML   04/11/2014 19 NG/ML   02/20/2014 5 ng/mL (L)   10/07/2011 5 ng/mL (L)       Stool Blood 2/24/2014: negative  Celiac panel 12/8/2014: negative    EGD and Colonscopy with Dr. Marcos Mckeon 4/9/2014: severe diverticulosis, nonbleeding  Capsule study 4/30/2014: normal    EGD and Colonoscopy with Dr. Katerina Galvin 6/16/2016: large cecum AVM, one polyp (tubular adenoma), otherwise normal.  Capsule endoscopy 6/30/2016: normal    EGD and Colonoscopy with Dr. Katerina Galvin 5/22/2018: Normal esophagus, stomach, duodenum, jejunum. AVM in colon treated. EGD and Colonoscopy with Dr. Katerina Galvin 6/2/2020:  EGD:  Esophageal ring, acquired. Probably related to GERD. Colonoscopy: Colon polyps, diverticulosis and right colon AVM. Assessment:   1) Anemia, Iron Deficiency  Recurrent. S/p feraheme in 5/2016 and 7/2016, and injectafer in 2/2018, 11/2020, and 4/2022. Anemia resolves with IV iron, but subsequently recurs. She cannot tolerate oral iron. Her HGB is now elevated at 16.6 and iron studies are normal.    The source of her iron deficiency is likely recurrent AVMs. Celiac panel was negative, and no reason to suggest another malabsorption issue. No evidence of bleeding from elsewhere.   She is at risk for recurrence in the future, so we will monitor. 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 She is at risk for recurrence. Monitor. 3) COPD  Follows with pulmonary. Receiving an injection once a month which has helped her symptoms quite a bit. 4) Fatigue  Persists despite resolution of her iron deficiency and anemia. Appears unrelated. Follow up with PCP to explore other causes. 5) Dizziness  Persists despite resolution of her iron deficiency and anemia, though a little better. Appears unrelated. Follow up with PCP to explore other causes. Plan:     Labs in 12 months: CBC, iron profile, ferritin [facility])  Return to see me in 12 months      Signed By: Vinod Acosta MD      The patient was evaluated through a synchronous (real-time) audio-video encounter. The patient (or guardian if applicable) is aware that this is a billable service. Verbal consent to proceed has been obtained within the past 12 months. The visit was conducted pursuant to the emergency declaration under the [address] [place] [address] waiver authority and the [employer] General Act. Patient identification was verified, and a caregiver was present when appropriate. The patient was located in a state where the provider was credentialed to provide care.

## 2022-09-21 ENCOUNTER — VIRTUAL VISIT (OUTPATIENT)
Dept: ONCOLOGY | Age: 72
End: 2022-09-21
Payer: MEDICARE

## 2022-09-21 DIAGNOSIS — D50.0 IRON DEFICIENCY ANEMIA DUE TO CHRONIC BLOOD LOSS: Primary | ICD-10-CM

## 2022-09-21 PROCEDURE — 99214 OFFICE O/P EST MOD 30 MIN: CPT | Performed by: INTERNAL MEDICINE

## 2022-09-21 PROCEDURE — G8427 DOCREV CUR MEDS BY ELIG CLIN: HCPCS | Performed by: INTERNAL MEDICINE

## 2022-09-21 PROCEDURE — 3017F COLORECTAL CA SCREEN DOC REV: CPT | Performed by: INTERNAL MEDICINE

## 2022-09-21 PROCEDURE — G8417 CALC BMI ABV UP PARAM F/U: HCPCS | Performed by: INTERNAL MEDICINE

## 2022-09-21 PROCEDURE — G8432 DEP SCR NOT DOC, RNG: HCPCS | Performed by: INTERNAL MEDICINE

## 2022-09-21 PROCEDURE — 1090F PRES/ABSN URINE INCON ASSESS: CPT | Performed by: INTERNAL MEDICINE

## 2022-09-21 PROCEDURE — G8399 PT W/DXA RESULTS DOCUMENT: HCPCS | Performed by: INTERNAL MEDICINE

## 2022-09-21 PROCEDURE — G9899 SCRN MAM PERF RSLTS DOC: HCPCS | Performed by: INTERNAL MEDICINE

## 2022-09-21 PROCEDURE — G0463 HOSPITAL OUTPT CLINIC VISIT: HCPCS | Performed by: INTERNAL MEDICINE

## 2022-09-21 PROCEDURE — G8756 NO BP MEASURE DOC: HCPCS | Performed by: INTERNAL MEDICINE

## 2022-09-21 PROCEDURE — 1101F PT FALLS ASSESS-DOCD LE1/YR: CPT | Performed by: INTERNAL MEDICINE

## 2022-09-21 PROCEDURE — G8536 NO DOC ELDER MAL SCRN: HCPCS | Performed by: INTERNAL MEDICINE

## 2022-09-21 PROCEDURE — 1123F ACP DISCUSS/DSCN MKR DOCD: CPT | Performed by: INTERNAL MEDICINE

## 2022-09-21 NOTE — PROGRESS NOTES
Pavithra Casas is a 81 y.o. female follow up for anemia. 1. Have you been to the ER, urgent care clinic since your last visit? Hospitalized since your last visit?no     2. Have you seen or consulted any other health care providers outside of the [address] [place] [address] since your last visit? Include any pap smears or colon screening.  no

## 2022-09-21 NOTE — PATIENT INSTRUCTIONS
Thank you for participating in the virtual visit with Dr. Em Meneses. We will call you soon to schedule a follow up appointment with us in 1 year. If you do not hear from us, please call us at [phone] to schedule your appointment. Please use the enclosed lab slip to have your labs done before your next appointment.

## 2022-12-05 ENCOUNTER — TRANSCRIBE ORDER (OUTPATIENT)
Dept: SCHEDULING | Age: 72
End: 2022-12-05

## 2022-12-05 DIAGNOSIS — Z12.31 OTHER SCREENING MAMMOGRAM: Primary | ICD-10-CM

## 2022-12-09 ENCOUNTER — HOSPITAL ENCOUNTER (OUTPATIENT)
Dept: MAMMOGRAPHY | Age: 72
End: 2022-12-09
Attending: NURSE PRACTITIONER
Payer: MEDICARE

## 2022-12-09 DIAGNOSIS — Z12.31 OTHER SCREENING MAMMOGRAM: ICD-10-CM

## 2022-12-09 PROCEDURE — 77063 BREAST TOMOSYNTHESIS BI: CPT

## 2022-12-22 ENCOUNTER — NURSE TRIAGE (OUTPATIENT)
Dept: OTHER | Facility: CLINIC | Age: 72
End: 2022-12-22

## 2022-12-22 ENCOUNTER — TELEPHONE (OUTPATIENT)
Dept: FAMILY MEDICINE CLINIC | Age: 72
End: 2022-12-22

## 2022-12-22 NOTE — TELEPHONE ENCOUNTER
PT stated that she is experiencing shortness of breath, difficulty breathing and coughing, she stated she has COPD. PSR spoke with nurse Lashonda Haynes and was told directed to advise PT to be seen at an urgent care due to no appointments available in office today or tomorrow. PT stated that she understood what she was being advised.

## 2022-12-22 NOTE — TELEPHONE ENCOUNTER
[facility]    Received call from Niels at [facility] with Red Flag Complaint. Subjective: Caller states \"URI\"     Current Symptoms:   Productive cough of yellow sputum  Sore throat  Head congestion  Chest tightness  Worsening SOB upon exertion  Denies wheezing    Hx of COPD    Onset: 2 days ago; worsening    Pain Severity: 0/10    Temperature: Denies    What has been tried: Symbicort inhaler    Recommended disposition: See in Office Today    Care advice provided, patient verbalizes understanding; denies any other questions or concerns; instructed to call back for any new or worsening symptoms. Patient/Caller agrees with recommended disposition; writer provided warm transfer to Ramiro Ca at [facility] for appointment scheduling    Attention Provider: Thank you for allowing me to participate in the care of your patient. The patient was connected to triage in response to information provided to the ECC. Please do not respond through this encounter as the response is not directed to a shared pool.     Reason for Disposition   Known COPD or other severe lung disease (i.e., bronchiectasis, cystic fibrosis, lung surgery) and worsening symptoms (i.e., increased sputum purulence or amount, increased breathing difficulty)    Protocols used: Cough-ADULT-[place]

## 2023-03-01 ENCOUNTER — OFFICE VISIT (OUTPATIENT)
Dept: FAMILY MEDICINE CLINIC | Age: 73
End: 2023-03-01
Payer: MEDICARE

## 2023-03-01 VITALS
TEMPERATURE: 98.2 F | WEIGHT: 216.8 LBS | BODY MASS INDEX: 37.01 KG/M2 | HEIGHT: 64 IN | SYSTOLIC BLOOD PRESSURE: 120 MMHG | OXYGEN SATURATION: 95 % | HEART RATE: 61 BPM | DIASTOLIC BLOOD PRESSURE: 73 MMHG | RESPIRATION RATE: 18 BRPM

## 2023-03-01 DIAGNOSIS — J30.89 NON-SEASONAL ALLERGIC RHINITIS, UNSPECIFIED TRIGGER: ICD-10-CM

## 2023-03-01 DIAGNOSIS — R73.9 HYPERGLYCEMIA: ICD-10-CM

## 2023-03-01 DIAGNOSIS — E66.01 SEVERE OBESITY (BMI 35.0-39.9) WITH COMORBIDITY (HCC): ICD-10-CM

## 2023-03-01 DIAGNOSIS — Z71.89 ADVANCED DIRECTIVES, COUNSELING/DISCUSSION: ICD-10-CM

## 2023-03-01 DIAGNOSIS — E78.00 HYPERCHOLESTEROLEMIA: ICD-10-CM

## 2023-03-01 DIAGNOSIS — K21.9 GASTROESOPHAGEAL REFLUX DISEASE WITHOUT ESOPHAGITIS: ICD-10-CM

## 2023-03-01 DIAGNOSIS — J45.50 SEVERE PERSISTENT ASTHMA WITHOUT COMPLICATION: ICD-10-CM

## 2023-03-01 DIAGNOSIS — I10 ESSENTIAL HYPERTENSION WITH GOAL BLOOD PRESSURE LESS THAN 130/80: ICD-10-CM

## 2023-03-01 DIAGNOSIS — Z00.00 MEDICARE ANNUAL WELLNESS VISIT, SUBSEQUENT: Primary | ICD-10-CM

## 2023-03-01 DIAGNOSIS — F41.1 GAD (GENERALIZED ANXIETY DISORDER): ICD-10-CM

## 2023-03-01 LAB
ALBUMIN SERPL-MCNC: 3.9 G/DL (ref 3.5–5)
ALBUMIN/GLOB SERPL: 1.3 (ref 1.1–2.2)
ALP SERPL-CCNC: 62 U/L (ref 45–117)
ALT SERPL-CCNC: 27 U/L (ref 12–78)
ANION GAP SERPL CALC-SCNC: 5 MMOL/L (ref 5–15)
AST SERPL-CCNC: 34 U/L (ref 15–37)
BILIRUB SERPL-MCNC: 0.6 MG/DL (ref 0.2–1)
BUN SERPL-MCNC: 15 MG/DL (ref 6–20)
BUN/CREAT SERPL: 16 (ref 12–20)
CALCIUM SERPL-MCNC: 9.4 MG/DL (ref 8.5–10.1)
CHLORIDE SERPL-SCNC: 104 MMOL/L (ref 97–108)
CHOLEST SERPL-MCNC: 123 MG/DL
CO2 SERPL-SCNC: 32 MMOL/L (ref 21–32)
CREAT SERPL-MCNC: 0.94 MG/DL (ref 0.55–1.02)
ERYTHROCYTE [DISTWIDTH] IN BLOOD BY AUTOMATED COUNT: 14 % (ref 11.5–14.5)
EST. AVERAGE GLUCOSE BLD GHB EST-MCNC: 100 MG/DL
GLOBULIN SER CALC-MCNC: 3.1 G/DL (ref 2–4)
GLUCOSE SERPL-MCNC: 93 MG/DL (ref 65–100)
HBA1C MFR BLD: 5.1 % (ref 4–5.6)
HCT VFR BLD AUTO: 47.3 % (ref 35–47)
HDLC SERPL-MCNC: 34 MG/DL
HDLC SERPL: 3.6 (ref 0–5)
HGB BLD-MCNC: 15.8 G/DL (ref 11.5–16)
LDLC SERPL CALC-MCNC: 41.4 MG/DL (ref 0–100)
MCH RBC QN AUTO: 28.8 PG (ref 26–34)
MCHC RBC AUTO-ENTMCNC: 33.4 G/DL (ref 30–36.5)
MCV RBC AUTO: 86.2 FL (ref 80–99)
NRBC # BLD: 0 K/UL (ref 0–0.01)
NRBC BLD-RTO: 0 PER 100 WBC
PLATELET # BLD AUTO: 201 K/UL (ref 150–400)
PMV BLD AUTO: 9.4 FL (ref 8.9–12.9)
POTASSIUM SERPL-SCNC: 3.9 MMOL/L (ref 3.5–5.1)
PROT SERPL-MCNC: 7 G/DL (ref 6.4–8.2)
RBC # BLD AUTO: 5.49 M/UL (ref 3.8–5.2)
SODIUM SERPL-SCNC: 141 MMOL/L (ref 136–145)
TRIGL SERPL-MCNC: 238 MG/DL (ref ?–150)
TSH SERPL DL<=0.05 MIU/L-ACNC: 1.83 UIU/ML (ref 0.36–3.74)
VLDLC SERPL CALC-MCNC: 47.6 MG/DL
WBC # BLD AUTO: 8.6 K/UL (ref 3.6–11)

## 2023-03-01 PROCEDURE — G0463 HOSPITAL OUTPT CLINIC VISIT: HCPCS | Performed by: NURSE PRACTITIONER

## 2023-03-01 RX ORDER — TRIAMTERENE/HYDROCHLOROTHIAZID 37.5-25 MG
1 TABLET ORAL DAILY
Qty: 90 TABLET | Refills: 1 | Status: SHIPPED | OUTPATIENT
Start: 2023-03-01

## 2023-03-01 RX ORDER — PANTOPRAZOLE SODIUM 40 MG/1
40 TABLET, DELAYED RELEASE ORAL DAILY
Qty: 90 TABLET | Refills: 1 | Status: SHIPPED | OUTPATIENT
Start: 2023-03-01

## 2023-03-01 RX ORDER — LOSARTAN POTASSIUM 25 MG/1
25 TABLET ORAL DAILY
Qty: 90 TABLET | Refills: 1 | Status: SHIPPED | OUTPATIENT
Start: 2023-03-01

## 2023-03-01 RX ORDER — POTASSIUM CHLORIDE 20 MEQ/1
TABLET, EXTENDED RELEASE ORAL
Qty: 90 TABLET | Refills: 1 | Status: SHIPPED | OUTPATIENT
Start: 2023-03-01

## 2023-03-01 RX ORDER — PAROXETINE HYDROCHLORIDE 20 MG/1
20 TABLET, FILM COATED ORAL DAILY
Qty: 90 TABLET | Refills: 1 | Status: SHIPPED | OUTPATIENT
Start: 2023-03-01

## 2023-03-01 RX ORDER — ROSUVASTATIN CALCIUM 10 MG/1
10 TABLET, COATED ORAL
Qty: 90 TABLET | Refills: 1 | Status: SHIPPED | OUTPATIENT
Start: 2023-03-01

## 2023-03-01 RX ORDER — LEVOCETIRIZINE DIHYDROCHLORIDE 5 MG/1
5 TABLET, FILM COATED ORAL DAILY
Qty: 90 TABLET | Refills: 1 | Status: SHIPPED | OUTPATIENT
Start: 2023-03-01

## 2023-03-01 RX ORDER — MONTELUKAST SODIUM 10 MG/1
10 TABLET ORAL DAILY
Qty: 90 TABLET | Refills: 1 | Status: SHIPPED | OUTPATIENT
Start: 2023-03-01

## 2023-03-01 NOTE — PATIENT INSTRUCTIONS
Medicare Wellness Visit, Female     The best way to live healthy is to have a lifestyle where you eat a well-balanced diet, exercise regularly, limit alcohol use, and quit all forms of tobacco/nicotine, if applicable. Regular preventive services are another way to keep healthy. Preventive services (vaccines, screening tests, monitoring & exams) can help personalize your care plan, which helps you manage your own care. Screening tests can find health problems at the earliest stages, when they are easiest to treat. [facility] follows the current, evidence-based guidelines published by the [facility] ([facility]) when recommending preventive services for our patients. Because we follow these guidelines, sometimes recommendations change over time as research supports it. (For example, mammograms used to be recommended annually. Even though Medicare will still pay for an annual mammogram, the newer guidelines recommend a mammogram every two years for women of average risk). Of course, you and your doctor may decide to screen more often for some diseases, based on your risk and your co-morbidities (chronic disease you are already diagnosed with). Preventive services for you include:  - Medicare offers their members a free annual wellness visit, which is time for you and your primary care provider to discuss and plan for your preventive service needs.  Take advantage of this benefit every year!    -Over the age of 72 should receive the recommended pneumonia vaccines.    -All adults should have a flu vaccine yearly.  -All adults should have a tetanus vaccine every 10 years.   -Over the age 48 should receive the shingles vaccines.        -All adults should be screened once for Hepatitis C.  -All adults age 38-68 who are overweight should have a diabetes screening test once every three years.   -Other screening tests and preventive services for persons with diabetes include: an eye exam to screen for diabetic retinopathy, a kidney function test, a foot exam, and stricter control over your cholesterol.   -Cardiovascular screening for adults with routine risk involves an electrocardiogram (ECG) at intervals determined by your doctor.     -Colorectal cancer screenings should be done for adults age 39-70 with no increased risk factors for colorectal cancer. There are a number of acceptable methods of screening for this type of cancer. Each test has its own benefits and drawbacks. Discuss with your doctor what is most appropriate for you during your annual wellness visit. The different tests include: colonoscopy (considered the best screening method), a fecal occult blood test, a fecal DNA test, and sigmoidoscopy.    -Lung cancer screening is recommended annually with a low dose CT scan for adults between age 54 and 68, who have smoked at least 30 pack years (equivalent of 1 pack per day for 30 days), and who is a current smoker or quit less than 15 years ago.    -A bone mass density test is recommended when a woman turns 65 to screen for osteoporosis. This test is only recommended one time, as a screening. Some providers will use this same test as a disease monitoring tool if you already have osteoporosis. -Breast cancer screenings are recommended every other year for women of normal risk, age 54-69.    -Cervical cancer screenings for women over age 72 are only recommended with certain risk factors.      Here is a list of your current Health Maintenance items (your personalized list of preventive services) with a due date:  Health Maintenance Due   Topic Date Due    Shingles Vaccine (1 of 2) Never done    COVID-19 Vaccine (4 - Booster for Moderna series) 01/10/2022    Yearly Flu Vaccine (1) 08/01/2022    Cholesterol Test   03/08/2023

## 2023-03-01 NOTE — PROGRESS NOTES
This is the Subsequent Medicare Annual Wellness Exam, performed 12 months or more after the Initial AWV or the last Subsequent AWV    I have reviewed the patient's medical history in detail and updated the computerized patient record. Assessment/Plan       1. Medicare annual wellness visit, subsequent  Education and counseling provided:  Are appropriate based on today's review and evaluation  End-of-Life planning (with patient's consent)  Influenza Vaccine- completed oct 2022  Screening Mammography- up to date, next screening due Dec 2023  Colorectal cancer screening tests- up to date, next screening due 2030    2. Advanced directives, counseling/discussion  See acp note    3. Severe obesity (BMI 35.0-39. 9) with comorbidity ([address]  I have reviewed/discussed the above normal BMI with the patient. I have recommended the following interventions: dietary management education, guidance, and counseling, encourage exercise, and monitor weight . Roberta Lino Follow up: Next Medicare wellness visit recommended in 12 months    I have discussed the diagnosis with the patient and the intended plan as seen in the above orders. The patient has received an after-visit summary and questions were answered concerning future plans. Patient conveyed understanding of the plan at the time of the visit. Depression Risk Factor Screening     3 most recent PHQ Screens 3/1/2023   Little interest or pleasure in doing things Not at all   Feeling down, depressed, irritable, or hopeless Not at all   Total Score PHQ 2 0       Alcohol & Drug Abuse Risk Screen    Do you average more than 1 drink per night or more than 7 drinks a week:  No    On any one occasion in the past three months have you have had more than 3 drinks containing alcohol:  No          Functional Ability and Level of Safety    Hearing: Hearing is good. Activities of Daily Living:   The home contains: handrails  Patient does total self care      Ambulation: with no difficulty     Fall Risk:  Fall Risk Assessment, last 12 mths 3/1/2023   Able to walk? Yes   Fall in past 12 months? 1   Do you feel unsteady? 1   Are you worried about falling 0   Is the gait abnormal? 0   Number of falls in past 12 months 1   Fall with injury? 0      Abuse Screen:  Patient is not abused       Cognitive Screening    Has your family/caregiver stated any concerns about your memory: no     Cognitive Screening: normal    Health Maintenance Due     Health Maintenance Due   Topic Date Due    Shingles Vaccine (1 of 2) Never done    Lipid Screen  03/08/2023       Patient Care Team   Patient Care Team:  Nyla Gonzales NP as PCP - General (Nurse Practitioner)  Nyla Gonzales NP as PCP - [facility]  Kacy Azul MD (Hematology and Oncology)  Puma Walsh MD (Gastroenterology)  Nayan Arreola MD as Physician (Pulmonary Disease)    History     Patient Active Problem List   Diagnosis Code    GERD (gastroesophageal reflux disease) K21.9    Obesity (BMI 30-39. 9) E66.9    Essential hypertension with goal blood pressure less than 130/80 I10    Hypercholesterolemia E78.00    Iron deficiency anemia due to chronic blood loss D50.0    AVM (arteriovenous malformation) of colon with hemorrhage K55.21    Severe persistent asthma without complication V84.93    ANISA (generalized anxiety disorder) F41.1    Non-seasonal allergic rhinitis J30.89    Severe obesity (BMI 35.0-39. 9) with comorbidity ([facility] [address] E66.01     Past Medical History:   Diagnosis Date    Anemia     Arthritis     knee right    Asthma     Bradycardia 7/26/2016    Chronic obstructive pulmonary disease (HCC)     GERD (gastroesophageal reflux disease)     Hypercholesterolemia     Hypertension     Obesity (BMI 30-39. 9)     Psychiatric disorder     depression/ not currently taking    Vertigo     Vestibulopathy 7/27/2016      Past Surgical History:   Procedure Laterality Date    COLONOSCOPY N/A 5/22/2018    COLONOSCOPY performed by Shannan Nicole MD at [address] [place] [address] 10    COLONOSCOPY N/A 6/2/2020    COLONOSCOPY performed by Rajesh Kendall MD at [address] [place] [address] 10    HX COLONOSCOPY  10/25/2011    1 polyp which was removed, diverticulosis in distal descending colon & sigmoid colon. HX ENDOSCOPY  04/2014    HX GYN  1996    hysterectomy    HX ORTHOPAEDIC Left     foot surgery    HX OTHER SURGICAL  2006? L Lower Abd. Basal Cell CA    HX TONSILLECTOMY  1958     Current Outpatient Medications   Medication Sig Dispense Refill    PARoxetine (PAXIL) 20 mg tablet Take 1 Tablet by mouth daily. 90 Tablet 1    pantoprazole (PROTONIX) 40 mg tablet Take 1 Tablet by mouth daily. 90 Tablet 1    montelukast (SINGULAIR) 10 mg tablet Take 1 Tablet by mouth daily. 90 Tablet 1    levocetirizine (XYZAL) 5 mg tablet Take 1 Tablet by mouth daily. 90 Tablet 1    triamterene-hydroCHLOROthiazide (MAXZIDE) 37.5-25 mg per tablet Take 1 Tablet by mouth daily. 90 Tablet 1    losartan (COZAAR) 25 mg tablet Take 1 Tablet by mouth daily. 90 Tablet 1    rosuvastatin (CRESTOR) 10 mg tablet Take 1 Tablet by mouth nightly. 90 Tablet 1    potassium chloride (K-DUR, KLOR-CON M20) 20 mEq tablet Take 1 tablet by mouth once daily 90 Tablet 1    mepolizumab (Nucala) 100 mg/mL atIn injection 100 mg by SubCUTAneous route every month.      budesonide/formoterol fumarate (SYMBICORT IN) Take  by inhalation. Nebulizer & Compressor machine Use as directed 1 Each 0    ipratropium (ATROVENT) 0.02 % nebulizer solution 2.5 mL by Nebulization route as needed for Wheezing (every 6 hours as needed). 62.5 mL 0    diclofenac (VOLTAREN) 1 % gel Apply 2 g to affected area four (4) times daily as needed for Pain.  (Patient not taking: Reported on 3/1/2023) 200 g 1     Allergies   Allergen Reactions    Loratadine Nausea Only and Other (comments)     Muscle weakness       Family History   Problem Relation Age of Onset    Hypertension Mother     Cancer Father     Stroke Maternal Grandmother Cancer Paternal Grandmother         breast cancer    Breast Cancer Paternal Grandmother     Cancer Maternal Grandfather      Social History     Tobacco Use    Smoking status: Former     Packs/day: 1.00     Years: 18.00     Pack years: 18.00     Types: Cigarettes     Quit date: 1988     Years since quittin.6    Smokeless tobacco: Never    Tobacco comments:     Quit in    Substance Use Topics    Alcohol use: Yes     Comment: rarely         Mayra Mitchell NP   3/1/2023

## 2023-03-01 NOTE — PROGRESS NOTES
Room 14     Identified pt with two pt identifiers(name and ). Reviewed record in preparation for visit and have obtained necessary documentation. All patient medications has been reviewed. Chief Complaint   Patient presents with    Annual Wellness Visit       3 most recent PHQ Screens 3/1/2023   Little interest or pleasure in doing things Not at all   Feeling down, depressed, irritable, or hopeless Not at all   Total Score PHQ 2 0     Abuse Screening Questionnaire 2015   Do you ever feel afraid of your partner? N   Are you in a relationship with someone who physically or mentally threatens you? N   Is it safe for you to go home? Y       Health Maintenance Due   Topic    Shingles Vaccine (1 of 2)    COVID-19 Vaccine (4 - Booster for Moderna series)    Flu Vaccine (1)    Medicare Yearly Exam     Lipid Screen      Health Maintenance Review: Patient reminded of \"due or due soon\" health maintenance. I have asked the patient to contact his/her primary care provider (PCP) for follow-up on his/her health maintenance. Vitals:    23 0908   BP: 120/73   Pulse: 61   Resp: 18   Temp: 98.2 °F (36.8 °C)   TempSrc: Oral   SpO2: 95%   Weight: 216 lb 12.8 oz (98.3 kg)   Height: 5' 4\" (1.626 m)   PainSc:   0 - No pain   LMP: 1996       Wt Readings from Last 3 Encounters:   23 216 lb 12.8 oz (98.3 kg)   22 218 lb 9.6 oz (99.2 kg)   21 221 lb 3.2 oz (100.3 kg)     Temp Readings from Last 3 Encounters:   23 98.2 °F (36.8 °C) (Oral)   22 98.4 °F (36.9 °C) (Oral)   22 98 °F (36.7 °C)     BP Readings from Last 3 Encounters:   23 120/73   22 126/87   22 (!) 120/55     Pulse Readings from Last 3 Encounters:   23 61   22 63   22 82       1. \"Have you been to the ER, urgent care clinic since your last visit? Hospitalized since your last visit? \" No    2.  \"Have you seen or consulted any other health care providers outside of the [facility] since your last visit? \" No     3. For patients aged 39-70: Has the patient had a colonoscopy / FIT/ Cologuard? Yes - Care Gap present. Most recent result on file      If the patient is female:    4. For patients aged 41-77: Has the patient had a mammogram within the past 2 years? Yes - Care Gap present. Most recent result on file      5. For patients aged 21-65: Has the patient had a pap smear? NA - based on age or sex       Patient is accompanied by  I have received verbal consent from Ashely Cristobal to discuss any/all medical information while they are present in the room.

## 2023-03-01 NOTE — PROGRESS NOTES
Subjective:     Miles Medina is a 81 y.o. female who presents for follow up of hypertension, hyperlipidemia, asthma, ANISA, allergic rhinitis. Diet and Lifestyle: generally follows a low fat low cholesterol diet, generally follows a low sodium diet, sedentary, nonsmoker  Home BP Monitoring: is well controlled at home    Cardiovascular risk analysis - LDL goal is under 100  hypertension  hyperlipidemia  former smoker  obese  sedentary lifestyle. Compliance with treatment thus far has been good. Cardiovascular ROS: taking medications as instructed, no medication side effects noted, no TIA's, no chest pain on exertion, no dyspnea on exertion, no swelling of ankles, no orthostatic dizziness or lightheadedness, no orthopnea or paroxysmal nocturnal dyspnea, no palpitations, no intermittent claudication symptoms    Asthma symptom pattern stable, reports good compliance with Symbicort twice daily and remains on Nucala injections once monthly. Sees her pulmonologist regularly. Allergic rhinitis symptoms are currently well controlled on levocetirizine 5 mg daily and montelukast 10 mg daily, reports good compliance, tolerating well, no apparent side effects. Anxiety symptoms are well controlled on paroxetine 20 mg daily, good compliance, tolerating well. No apparent side effects. No thoughts of harming self or others. Patient was to continue current treatment plan. New concerns: none. Patient Active Problem List   Diagnosis Code    GERD (gastroesophageal reflux disease) K21.9    Obesity (BMI 30-39. 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 9) with comorbidity ([facility]) E66.01     Allergies   Allergen Reactions    Loratadine Nausea Only and Other (comments)     Muscle weakness     Past Medical History:   Diagnosis Date    Anemia     Arthritis     knee right    Asthma     Bradycardia 2016    Chronic obstructive pulmonary disease (HCC)     GERD (gastroesophageal reflux disease)     Hypercholesterolemia     Hypertension     Obesity (BMI 30-39. 9)     Psychiatric disorder     depression/ not currently taking    Vertigo     Vestibulopathy 2016     Past Surgical History:   Procedure Laterality Date    COLONOSCOPY N/A 2018    COLONOSCOPY performed by Olayinka Oliveira MD at [address] N/A 2020    [address] [facility]u [facility] [address]    HX COLONOSCOPY  10/25/2011    1 polyp which was removed, diverticulosis in distal descending colon & sigmoid colon. HX ENDOSCOPY  2014    HX GYN  1996    hysterectomy    HX ORTHOPAEDIC Left     foot surgery    HX OTHER SURGICAL  2006? L Lower Abd.  Basal Cell CA    HX TONSILLECTOMY       Family History   Problem Relation Age of Onset    Hypertension Mother     Cancer Father     Stroke Maternal Grandmother     Cancer Paternal Grandmother         breast cancer    Breast Cancer Paternal Grandmother     Cancer Maternal Grandfather      Social History     Tobacco Use    Smoking status: Former     Packs/day: 1.00     Years: 18.00     Pack years: 18.00     Types: Cigarettes     Quit date: 1988     Years since quittin.6    Smokeless tobacco: Never    Tobacco comments:     Quit in    Substance Use Topics    Alcohol use: Yes     Comment: rarely        Lab Results   Component Value Date/Time    WBC 9.8 2022 01:50 PM    HGB 16.6 (H) 2022 01:50 PM    Hemoglobin (POC) 8.8 2014 11:34 AM    Hemoglobin (POC) 10.2 (L) 10/15/2011 03:12 PM    HCT 47.3 (H) 2022 01:50 PM    Hematocrit (POC) 30 (L) 10/15/2011 03:12 PM    PLATELET 191 09/08/2022 01:50 PM    MCV 87.6 09/08/2022 01:50 PM     Lab Results   Component Value Date/Time    Hemoglobin A1c 5.1 09/25/2018 09:50 AM    Glucose 133 (H) 03/08/2022 05:00 PM    Glucose (POC) 83 07/26/2016 12:03 PM    Glucose POC 90 12/05/2016 11:08 AM    Microalb/Creat ratio (ug/mg creat.) 3.7 07/16/2012 10:51 AM    LDL,Direct 65 03/08/2022 05:00 PM    LDL, calculated Not calculated due to elevated triglyceride level 03/08/2022 05:00 PM    Creatinine (POC) 1.1 10/15/2011 03:12 PM    Creatinine 0.84 03/08/2022 05:00 PM      Lab Results   Component Value Date/Time    Cholesterol, total 124 03/08/2022 05:00 PM    HDL Cholesterol 28 03/08/2022 05:00 PM    LDL,Direct 65 03/08/2022 05:00 PM    LDL, calculated Not calculated due to elevated triglyceride level 03/08/2022 05:00 PM    Triglyceride 426 (H) 03/08/2022 05:00 PM    CHOL/HDL Ratio 4.4 03/08/2022 05:00 PM     Lab Results   Component Value Date/Time    ALT (SGPT) 26 03/08/2022 05:00 PM    Alk.  phosphatase 74 03/08/2022 05:00 PM    Bilirubin, direct 0.1 10/15/2011 03:07 PM    Bilirubin, total 0.3 03/08/2022 05:00 PM    Albumin 3.9 03/08/2022 05:00 PM    Protein, total 7.3 03/08/2022 05:00 PM    INR 1.0 10/15/2011 03:07 PM    INR (POC) 1.0 07/26/2016 12:07 PM    Prothrombin time 10.0 10/15/2011 03:07 PM    PLATELET 098 46/66/3286 01:50 PM       Lab Results   Component Value Date/Time    GFR est non-AA >60 03/08/2022 05:00 PM    GFRNA, POC 54 (L) 10/15/2011 03:12 PM    GFR est AA >60 03/08/2022 05:00 PM    GFRAA, POC >60 10/15/2011 03:12 PM    Creatinine 0.84 03/08/2022 05:00 PM    Creatinine (POC) 1.1 10/15/2011 03:12 PM    BUN 13 03/08/2022 05:00 PM    BUN (POC) 10 10/15/2011 03:12 PM    Sodium 139 03/08/2022 05:00 PM    Sodium (POC) 141 10/15/2011 03:12 PM    Potassium 3.5 03/08/2022 05:00 PM    Potassium (POC) 3.5 10/15/2011 03:12 PM    Chloride 105 03/08/2022 05:00 PM    Chloride (POC) 104 10/15/2011 03:12 PM    CO2 28 03/08/2022 05:00 PM    Magnesium 1.9 07/27/2016 01:03 AM     Lab Results   Component Value Date/Time    TSH 1.88 03/08/2022 05:00 PM    T4, Free 0.98 04/09/2013 04:10 PM         Review of Systems, additional:  A comprehensive review of systems was negative except for that written in the HPI.     Objective:     Visit Vitals  /73 (BP 1 Location: Left upper arm, BP Patient Position: Sitting, BP Cuff Size: Adult)   Pulse 61   Temp 98.2 °F (36.8 °C) (Oral)   Resp 18   Ht 5' 4\" (1.626 m)   Wt 216 lb 12.8 oz (98.3 kg)   LMP 01/01/1996   SpO2 95%   BMI 37.21 kg/m²     Physical Examination: General appearance - alert, well appearing, and in no distress, oriented to person, place, and time, and well hydrated  Mental status - normal mood, behavior, speech, dress, motor activity, and thought processes  Eyes - sclera anicteric  Ears - bilateral TM's and external ear canals normal  Nose - normal and patent, no erythema, discharge or polyps  Mouth - mucous membranes moist, pharynx normal without lesions, dental hygiene good, and tongue normal  Neck - supple, no significant adenopathy, thyroid exam: thyroid is normal in size without nodules or tenderness  Chest - clear to auscultation, no wheezes, rales or rhonchi, symmetric air entry  Heart - normal rate, regular rhythm, normal S1, S2, no murmurs, rubs, clicks or gallops, normal bilateral carotid upstroke without bruits, no JVD  Abdomen - soft, nontender, nondistended, no masses or organomegaly  bowel sounds normal  Neurological - alert, oriented, normal speech, no focal findings or movement disorder noted  Musculoskeletal - no joint tenderness, deformity or swelling, no muscular tenderness noted  Extremities - no pedal edema noted, intact peripheral pulses  Skin - normal coloration and turgor, no rashes, no suspicious skin lesions noted      Assessment/Plan:       reviewed diet, exercise and weight control  copy of written low fat low cholesterol diet provided and reviewed  cardiovascular risk and specific lipid/LDL goals reviewed  reviewed medications and side effects in detail. Diagnoses and all orders for this visit:    1. Essential hypertension with goal blood pressure less than 130/80  At goal goal  Continue losartan 25 mg daily, triamterene-hydrochlorothiazide 37.5-25 mg daily  Recommended:  Low sodium diet  Weight loss  150 minutes of moderate intensity exercise weekly  -     METABOLIC PANEL, COMPREHENSIVE; Future  -     CBC W/O DIFF; Future  -     TSH 3RD GENERATION; Future  -     triamterene-hydroCHLOROthiazide (MAXZIDE) 37.5-25 mg per tablet; Take 1 Tablet by mouth daily. -     losartan (COZAAR) 25 mg tablet; Take 1 Tablet by mouth daily. -     potassium chloride (K-DUR, KLOR-CON M20) 20 mEq tablet; Take 1 tablet by mouth once daily    2. Hypercholesterolemia  Last LDL at goal  Repeat fasting lipid panel today  Continue rosuvastatin 10 mg daily pending results  Continue heart healthy diet low in saturated fat  -     LIPID PANEL; Future  -     METABOLIC PANEL, COMPREHENSIVE; Future  -     rosuvastatin (CRESTOR) 10 mg tablet; Take 1 Tablet by mouth nightly. 3. Severe persistent asthma without complication  Symptom pattern stable  Encouraged to continue Symbicort and Nucala as prescribed  Follow-up with pulmonary as scheduled    4. Hyperglycemia  Noted on previous lab work  Check A1c  -     HEMOGLOBIN A1C WITH EAG; Future    5. ANISA (generalized anxiety disorder)  Symptoms well controlled  Continue paroxetine 20 mg daily  -     PARoxetine (PAXIL) 20 mg tablet; Take 1 Tablet by mouth daily. 6. Non-seasonal allergic rhinitis, unspecified trigger  Symptoms controlled  Continue levothyroxine 5 mg daily, montelukast 10 mg daily  -     montelukast (SINGULAIR) 10 mg tablet; Take 1 Tablet by mouth daily. -     levocetirizine (XYZAL) 5 mg tablet; Take 1 Tablet by mouth daily.     7. GERD  Symptoms controlled  Continue pantoprazole 40 mg daily  GERD diet recommended  Weight loss recommended  -     pantoprazole (PROTONIX) 40 mg tablet; Take 1 Tablet by mouth daily. Follow-up and Dispositions    Return in about 6 months (around 9/1/2023), or if symptoms worsen or fail to improve. I have discussed the diagnosis with the patient and the intended plan as seen in the above orders. The patient has received an after-visit summary and questions were answered concerning future plans. Patient conveyed understanding of the plan at the time of the visit.     Nafisa Vicente NP  3/1/2023

## 2023-03-01 NOTE — ACP (ADVANCE CARE PLANNING)
Advance Care Planning     Advance Care Planning (ACP) Provider Conversation Snapshot     Date of ACP Conversation: 3/1/2023    Persons included in Conversation:  patient, spouse  Length of ACP Conversation in minutes:  <16 minutes (Non-Billable)     Authorized Decision Maker (if patient is incapable of making informed decisions): This person is:    Emiliaene Li                                                       For Patients with Decision Making Capacity:   Values/Goals: Exploration of values, goals, and preferences if recovery is not expected, even with continued medical treatment in the event of:  Imminent death  Severe, permanent brain injury  \"In these circumstances, what matters most to you? \"  Care focused more on comfort or quality of life. Conversation Outcomes / Follow-Up Plan:   Patient reports existing advanced directive which reflects her current wishes. Requested copy of advance directive for her chart.   Recommended communicating the plan and making copies for the healthcare agent, personal physician, and others as appropriate (e.g., health system)  Recommended review of completed ACP document annually or upon change in health status      Maria Eugenia Pichardo NP  3/1/2023

## 2023-04-18 ENCOUNTER — HOSPITAL ENCOUNTER (EMERGENCY)
Age: 73
Discharge: HOME | End: 2023-04-18
Attending: EMERGENCY MEDICINE
Payer: MEDICARE

## 2023-04-18 VITALS
RESPIRATION RATE: 17 BRPM | HEIGHT: 64 IN | HEART RATE: 56 BPM | OXYGEN SATURATION: 95 % | TEMPERATURE: 98.2 F | DIASTOLIC BLOOD PRESSURE: 67 MMHG | WEIGHT: 219.4 LBS | SYSTOLIC BLOOD PRESSURE: 118 MMHG | BODY MASS INDEX: 37.46 KG/M2

## 2023-04-18 DIAGNOSIS — R00.2 PALPITATIONS: Primary | ICD-10-CM

## 2023-04-18 LAB
ALBUMIN SERPL-MCNC: 3.8 G/DL (ref 3.5–5.2)
ALBUMIN/GLOB SERPL: 1.5 (ref 1.1–2.2)
ALP SERPL-CCNC: 69 U/L (ref 35–104)
ALT SERPL-CCNC: 11 U/L (ref 10–35)
ANION GAP SERPL CALC-SCNC: 9 MMOL/L (ref 5–15)
AST SERPL-CCNC: 14 U/L (ref 10–35)
ATRIAL RATE: 57 BPM
BASOPHILS # BLD: 0.1 K/UL (ref 0–1)
BASOPHILS NFR BLD: 1 % (ref 0–1)
BILIRUB SERPL-MCNC: 0.3 MG/DL (ref 0.2–1)
BUN SERPL-MCNC: 15 MG/DL (ref 8–23)
BUN/CREAT SERPL: 18 (ref 12–20)
CALCIUM SERPL-MCNC: 9.2 MG/DL (ref 8.8–10.2)
CALCULATED P AXIS, ECG09: 22 DEGREES
CALCULATED R AXIS, ECG10: 10 DEGREES
CALCULATED T AXIS, ECG11: 105 DEGREES
CHLORIDE SERPL-SCNC: 104 MMOL/L (ref 98–107)
CO2 SERPL-SCNC: 30 MMOL/L (ref 22–29)
CREAT SERPL-MCNC: 0.85 MG/DL (ref 0.5–0.9)
DIAGNOSIS, 93000: NORMAL
DIFFERENTIAL METHOD BLD: ABNORMAL
EOSINOPHIL # BLD: 0.1 K/UL (ref 0–0.4)
EOSINOPHIL NFR BLD: 1 %
ERYTHROCYTE [DISTWIDTH] IN BLOOD BY AUTOMATED COUNT: 13.9 % (ref 11.5–14.5)
GLOBULIN SER CALC-MCNC: 2.6 G/DL (ref 2–4)
GLUCOSE SERPL-MCNC: 115 MG/DL (ref 65–100)
HCT VFR BLD AUTO: 40.8 % (ref 35–47)
HGB BLD-MCNC: 14.1 G/DL (ref 11.5–16)
IMM GRANULOCYTES # BLD AUTO: 0 K/UL (ref 0–0.04)
IMM GRANULOCYTES NFR BLD AUTO: 0 % (ref 0–0.5)
LYMPHOCYTES # BLD: 1.9 K/UL (ref 0.8–3.5)
LYMPHOCYTES NFR BLD: 21 % (ref 12–49)
MAGNESIUM SERPL-MCNC: 2.1 MG/DL (ref 1.6–2.4)
MCH RBC QN AUTO: 28.8 PG (ref 26–34)
MCHC RBC AUTO-ENTMCNC: 34.6 G/DL (ref 30–36.5)
MCV RBC AUTO: 83.3 FL (ref 80–99)
MONOCYTES # BLD: 1 K/UL (ref 0–1)
MONOCYTES NFR BLD: 12 % (ref 5–13)
NEUTS SEG # BLD: 5.8 K/UL (ref 1.8–8)
NEUTS SEG NFR BLD: 65 % (ref 32–75)
NRBC # BLD: 0 K/UL (ref 0–0.01)
NRBC BLD-RTO: 0 PER 100 WBC
P-R INTERVAL, ECG05: 176 MS
PLATELET # BLD AUTO: 182 K/UL (ref 150–400)
PMV BLD AUTO: 8.9 FL (ref 8.9–12.9)
POTASSIUM SERPL-SCNC: 3.2 MMOL/L (ref 3.5–5.1)
PROT SERPL-MCNC: 6.4 G/DL (ref 6.4–8.3)
Q-T INTERVAL, ECG07: 394 MS
QRS DURATION, ECG06: 98 MS
QTC CALCULATION (BEZET), ECG08: 383 MS
RBC # BLD AUTO: 4.9 M/UL (ref 3.8–5.2)
SODIUM SERPL-SCNC: 143 MMOL/L (ref 136–145)
VENTRICULAR RATE, ECG03: 57 BPM
WBC # BLD AUTO: 8.9 K/UL (ref 3.6–11)

## 2023-04-18 PROCEDURE — 93005 ELECTROCARDIOGRAM TRACING: CPT

## 2023-04-18 PROCEDURE — 85025 COMPLETE CBC W/AUTO DIFF WBC: CPT

## 2023-04-18 PROCEDURE — 36415 COLL VENOUS BLD VENIPUNCTURE: CPT

## 2023-04-18 PROCEDURE — 99284 EMERGENCY DEPT VISIT MOD MDM: CPT

## 2023-04-18 PROCEDURE — 80053 COMPREHEN METABOLIC PANEL: CPT

## 2023-04-18 PROCEDURE — 83735 ASSAY OF MAGNESIUM: CPT

## 2023-04-18 NOTE — ED NOTES
Bedside shift change report given to Charlie Weiner   (oncoming nurse) by Shena Becerra (offgoing nurse). Report included the following information SBAR.

## 2023-04-18 NOTE — ED PROVIDER NOTES
74-year-old female with history of anemia, bradycardia, COPD, GERD, high cholesterol, hypertension, obesity presents to the emergency department with her  with concern for irregular heartbeat. This occurred about 2 hours prior to arrival and has since subsided. She felt that she was having skipped beats and potentially tachycardia as well. She has no history of cardiovascular disease. Does not see cardiology. The history is provided by the patient and medical records. Palpitations   This is a new problem. The problem has been resolved. Past Medical History:   Diagnosis Date    Anemia     Arthritis     knee right    Asthma     Bradycardia 7/26/2016    Chronic obstructive pulmonary disease (HCC)     GERD (gastroesophageal reflux disease)     Hypercholesterolemia     Hypertension     Obesity (BMI 30-39. 9)     Psychiatric disorder     depression/ not currently taking    Vertigo     Vestibulopathy 7/27/2016       Past Surgical History:   Procedure Laterality Date    COLONOSCOPY N/A 5/22/2018    COLONOSCOPY performed by David Velazquez MD at [address] N/A 6/2/2020    COLONOSCOPY performed by Mary Lora MD at [address] [facility] [address]    HX COLONOSCOPY  10/25/2011    1 polyp which was removed, diverticulosis in distal descending colon & sigmoid colon. HX ENDOSCOPY  04/2014    HX GYN  1996    hysterectomy    HX ORTHOPAEDIC Left     foot surgery    HX OTHER SURGICAL  2006? L Lower Abd.  Basal Cell CA    HX TONSILLECTOMY  1958         Family History:   Problem Relation Age of Onset    Hypertension Mother     Cancer Father     Stroke Maternal Grandmother     Cancer Paternal Grandmother         breast cancer    Breast Cancer Paternal Grandmother     Cancer Maternal Grandfather        Social History     Socioeconomic History    Marital status:      Spouse name: Not on file    Number of children: Not on file    Years of education: Not on file    Highest education level: Not on file   Occupational History    Not on file   Tobacco Use    Smoking status: Former     Packs/day: 1.00     Years: 18.00     Pack years: 18.00     Types: Cigarettes     Quit date: 1988     Years since quittin.7    Smokeless tobacco: Never    Tobacco comments:     Quit in    Vaping Use    Vaping Use: Never used   Substance and Sexual Activity    Alcohol use: Yes     Comment: rarely    Drug use: No    Sexual activity: Yes     Partners: Male     Birth control/protection: Surgical     Comment: Hysterectomy   Other Topics Concern    Not on file   Social History Narrative    Not on file     Social Determinants of Health     Financial Resource Strain: Not on file   Food Insecurity: Not on file   Transportation Needs: Not on file   Physical Activity: Not on file   Stress: Not on file   Social Connections: Not on file   Intimate Partner Violence: Not on file   Housing Stability: Not on file         ALLERGIES: Loratadine    Review of Systems   Cardiovascular:  Positive for palpitations. Vitals:    23 0027   BP: (!) 140/77   Pulse: (!) 56   Resp: 17   Temp: 98.2 °F (36.8 °C)   SpO2: 97%   Weight: 99.5 kg (219 lb 6.4 oz)   Height: 5' 4\" (1.626 m)            Physical Exam  Vitals and nursing note reviewed. Constitutional:       General: She is not in acute distress. Appearance: She is obese. She is not ill-appearing. Cardiovascular:      Rate and Rhythm: Bradycardia present. Pulses: Normal pulses. Heart sounds: Normal heart sounds. Pulmonary:      Effort: Pulmonary effort is normal. No respiratory distress. Neurological:      Mental Status: She is alert. Medical Decision Making  74-year-old female presents as above with complaint of irregular heartbeat. EKG is reassuring, labs are without concerns. Plan to discharge with instructions to follow-up with cardiology given her age. Amount and/or Complexity of Data Reviewed  Labs: ordered.   ECG/medicine tests: ordered and independent interpretation performed. Decision-making details documented in ED Course. ED Course as of 04/18/23 0036   Tue Apr 18, 2023   0028   ED EKG interpretation:  Rhythm: Sinus bradycardia at a rate of 57. Axis: normal.  ST segment:  No concerning ST elevations or depressions. This EKG was interpreted by Taz Livingston MD,ED Provider.  [JM]      ED Course User Index  [JM] Eugenio High MD       Procedures

## 2023-04-18 NOTE — ED TRIAGE NOTES
PT reports to ED with  with complaints of an irregular heartbeat. PT states that a couple of hours ago she began to feel a \"fluttering\"  in her chest and felt her pulse in her wrist. PT stated it felt irregular to her and was concerned. Pt states she did not take her blood pressure at home. PT states that she did not feel faint and did not have any vision changes.

## 2023-04-21 DIAGNOSIS — D50.0 IRON DEFICIENCY ANEMIA DUE TO CHRONIC BLOOD LOSS: Primary | ICD-10-CM

## 2023-04-23 DIAGNOSIS — D50.0 IRON DEFICIENCY ANEMIA DUE TO CHRONIC BLOOD LOSS: Primary | ICD-10-CM

## 2023-05-12 ENCOUNTER — OFFICE VISIT (OUTPATIENT)
Age: 73
End: 2023-05-12
Payer: MEDICARE

## 2023-05-12 VITALS
OXYGEN SATURATION: 97 % | HEART RATE: 61 BPM | SYSTOLIC BLOOD PRESSURE: 102 MMHG | HEIGHT: 64 IN | DIASTOLIC BLOOD PRESSURE: 70 MMHG | WEIGHT: 218 LBS | BODY MASS INDEX: 37.22 KG/M2

## 2023-05-12 DIAGNOSIS — R00.2 PALPITATIONS: Primary | ICD-10-CM

## 2023-05-12 DIAGNOSIS — E78.00 HYPERCHOLESTEROLEMIA: ICD-10-CM

## 2023-05-12 DIAGNOSIS — R94.31 ABNORMAL EKG: ICD-10-CM

## 2023-05-12 DIAGNOSIS — I10 ESSENTIAL HYPERTENSION WITH GOAL BLOOD PRESSURE LESS THAN 130/80: ICD-10-CM

## 2023-05-12 PROCEDURE — G8399 PT W/DXA RESULTS DOCUMENT: HCPCS | Performed by: SPECIALIST

## 2023-05-12 PROCEDURE — 99204 OFFICE O/P NEW MOD 45 MIN: CPT | Performed by: SPECIALIST

## 2023-05-12 PROCEDURE — 3074F SYST BP LT 130 MM HG: CPT | Performed by: SPECIALIST

## 2023-05-12 PROCEDURE — G8417 CALC BMI ABV UP PARAM F/U: HCPCS | Performed by: SPECIALIST

## 2023-05-12 PROCEDURE — 1123F ACP DISCUSS/DSCN MKR DOCD: CPT | Performed by: SPECIALIST

## 2023-05-12 PROCEDURE — 1090F PRES/ABSN URINE INCON ASSESS: CPT | Performed by: SPECIALIST

## 2023-05-12 PROCEDURE — 3017F COLORECTAL CA SCREEN DOC REV: CPT | Performed by: SPECIALIST

## 2023-05-12 PROCEDURE — 3078F DIAST BP <80 MM HG: CPT | Performed by: SPECIALIST

## 2023-05-12 PROCEDURE — G8428 CUR MEDS NOT DOCUMENT: HCPCS | Performed by: SPECIALIST

## 2023-05-12 PROCEDURE — 1036F TOBACCO NON-USER: CPT | Performed by: SPECIALIST

## 2023-05-12 RX ORDER — POTASSIUM CHLORIDE 20 MEQ/1
20 TABLET, EXTENDED RELEASE ORAL 2 TIMES DAILY
Qty: 180 TABLET | Refills: 3 | Status: CANCELLED | OUTPATIENT
Start: 2023-05-12

## 2023-05-12 NOTE — PROGRESS NOTES
Maria Isabel Hutchinson MD. [facility]          Patient: Kayleigh Navarrete  : 1950      Today's Date: 2023        HISTORY OF PRESENT ILLNESS:     History of Present Illness:  Referred for palpitations     ED visit 23 - doc noted \"presents to the emergency department with her  with concern for irregular heartbeat. This occurred about 2 hours prior to arrival and has since subsided. She felt that she was having skipped beats and potentially tachycardia as well. \"    Says heart skips a beat. 3 weeks ago, in bed, felt chest fluttering and skipped beat on pulse. Has had it since then. No associated CP or SOB. PAST MEDICAL HISTORY:     Past Medical History:   Diagnosis Date    Anemia     Arthritis     knee right    Asthma     Bradycardia 2016    Chronic obstructive pulmonary disease (HCC)     GERD (gastroesophageal reflux disease)     Hypercholesterolemia     Hypertension     Obesity (BMI 30-39. 9)     Psychiatric disorder     depression/ not currently taking    Vertigo     Vestibulopathy 2016       Past Surgical History:   Procedure Laterality Date    COLONOSCOPY N/A 2020    COLONOSCOPY performed by Erik Benitez MD at [address] [place] [address]  10/25/2011    1 polyp which was removed, diverticulosis in distal descending colon & sigmoid colon. COLONOSCOPY N/A 2018    COLONOSCOPY performed by Ruthann Lott MD at [place] [address]    hysterectomy    ORTHOPEDIC SURGERY Left     foot surgery    OTHER SURGICAL HISTORY  ? L Lower Abd.  Basal Cell CA    TONSILLECTOMY      UPPER GASTROINTESTINAL ENDOSCOPY  2014             CURRENT MEDICATIONS:    .  Current Outpatient Medications   Medication Sig Dispense Refill    diclofenac sodium (VOLTAREN) 1 % GEL Apply topically 4 times daily as needed      ipratropium (ATROVENT) 0.02 % nebulizer solution Inhale into the lungs as needed      levocetirizine (XYZAL) 5 MG tablet Take by mouth daily

## 2023-05-12 NOTE — PROGRESS NOTES
Chief Complaint   Patient presents with    New Patient    Follow-Up from Hospital     ER- 4/18 Palp      Vitals:    05/12/23 1129   BP: 102/70   Site: Right Upper Arm   Position: Sitting   Pulse: 61   SpO2: 97%   Weight: 218 lb (98.9 kg)   Height: 5' 4\" (1.626 m)           Chest pain denied     SOB denied     Palpitations - yes     Swelling in hands/feet denied     Dizziness denied     Recent hospital stays denied     Refills denied

## 2023-05-15 ENCOUNTER — TELEPHONE (OUTPATIENT)
Age: 73
End: 2023-05-15

## 2023-05-30 NOTE — PATIENT INSTRUCTIONS
Dermatitis: Care Instructions  Your Care Instructions  Dermatitis is the general name used for any rash or inflammation of the skin. Different kinds of dermatitis cause different kinds of rashes. Common causes of a rash include new medicines, plants (such as poison oak or poison ivy), heat, and stress. Certain illnesses can also cause a rash. An allergic reaction to something that touches your skin, such as latex, nickel, or poison ivy, is called contact dermatitis. Contact dermatitis may also be caused by something that irritates the skin, such as bleach, a chemical, or soap. These types of rashes cannot be spread from person to person. How long your rash will last depends on what caused it. Rashes may last a few days or months. Follow-up care is a key part of your treatment and safety. Be sure to make and go to all appointments, and call your doctor if you are having problems. It's also a good idea to know your test results and keep a list of the medicines you take. How can you care for yourself at home? · Do not scratch the rash. Cut your nails short, and file them smooth. Or wear gloves if this helps keep you from scratching. · Wash the area with water only. Pat dry. · Put cold, wet cloths on the rash to reduce itching. · Keep cool, and stay out of the sun. · Leave the rash open to the air as much as possible. · If the rash itches, use hydrocortisone cream. Follow the directions on the label. Calamine lotion may help for plant rashes. · Take an over-the-counter antihistamine, such as diphenhydramine (Benadryl) or loratadine (Claritin), to help calm the itching. Read and follow all instructions on the label. · If your doctor prescribed a cream, use it as directed. If your doctor prescribed medicine, take it exactly as directed. When should you call for help?   Call your doctor now or seek immediate medical care if:    · You have symptoms of infection, such as:  ? Increased pain, swelling, warmth, or redness. ? Red streaks leading from the area. ? Pus draining from the area. ? A fever.     · You have joint pain along with the rash.    Watch closely for changes in your health, and be sure to contact your doctor if:    · Your rash is changing or getting worse.     · You are not getting better as expected. Where can you learn more? Go to http://negrita-zuleyka.info/. Enter [id number] [id number] [id number] in the search box to learn more about \"Dermatitis: Care Instructions. \"  Current as of: April 17, 2018  Content Version: 11.9  © 1942-3109 [employer]. Care instructions adapted under license by [employer] (which disclaims liability or warranty for this information). If you have questions about a medical condition or this instruction, always ask your healthcare professional. [address] any warranty or liability for your use of this information. Starting a Weight Loss Plan: Care Instructions  Your Care Instructions    If you are thinking about losing weight, it can be hard to know where to start. Your doctor can help you set up a weight loss plan that best meets your needs. You may want to take a class on nutrition or exercise, or join a weight loss support group. If you have questions about how to make changes to your eating or exercise habits, ask your doctor about seeing a registered dietitian or an exercise specialist.  It can be a big challenge to lose weight. But you do not have to make huge changes at once. Make small changes, and stick with them. When those changes become habit, add a few more changes. If you do not think you are ready to make changes right now, try to pick a date in the future. Make an appointment to see your doctor to discuss whether the time is right for you to start a plan. Follow-up care is a key part of your treatment and safety. Be sure to make and go to all appointments, and call your doctor if you are having problems.  It's also a good idea to know your test results and keep a list of the medicines you take. How can you care for yourself at home? · Set realistic goals. Many people expect to lose much more weight than is likely. A weight loss of 5% to 10% of your body weight may be enough to improve your health. · Get family and friends involved to provide support. Talk to them about why you are trying to lose weight, and ask them to help. They can help by participating in exercise and having meals with you, even if they may be eating something different. · Find what works best for you. If you do not have time or do not like to cook, a program that offers meal replacement bars or shakes may be better for you. Or if you like to prepare meals, finding a plan that includes daily menus and recipes may be best.  · Ask your doctor about other health professionals who can help you achieve your weight loss goals. ? A dietitian can help you make healthy changes in your diet. ? An exercise specialist or  can help you develop a safe and effective exercise program.  ? A counselor or psychiatrist can help you cope with issues such as depression, anxiety, or family problems that can make it hard to focus on weight loss. · Consider joining a support group for people who are trying to lose weight. Your doctor can suggest groups in your area. Where can you learn more? Go to http://negrita-zuleyka.info/. Enter F081 in the search box to learn more about \"Starting a Weight Loss Plan: Care Instructions. \"  Current as of: June 25, 2018  Content Version: 11.9  © 5315-6083 [employer]. Care instructions adapted under license by [employer] (which disclaims liability or warranty for this information).  If you have questions about a medical condition or this instruction, always ask your healthcare professional. [place] [address] any warranty or liability for your use of this information. Tazorac Pregnancy And Lactation Text: This medication is not safe during pregnancy. It is unknown if this medication is excreted in breast milk.

## 2023-06-06 RX ORDER — PANTOPRAZOLE SODIUM 40 MG/1
TABLET, DELAYED RELEASE ORAL
Qty: 90 TABLET | Refills: 1 | Status: SHIPPED | OUTPATIENT
Start: 2023-06-06

## 2023-06-08 ENCOUNTER — TELEPHONE (OUTPATIENT)
Age: 73
End: 2023-06-08

## 2023-06-08 NOTE — TELEPHONE ENCOUNTER
Per Dr. Judy Hardy: Bob Veliz you please let her know that holter overall normal - 7 day study.      Very brief SVT's - longest 8 beats - nothing significant \"

## 2023-06-27 DIAGNOSIS — R00.2 PALPITATIONS: ICD-10-CM

## 2023-06-27 DIAGNOSIS — I10 ESSENTIAL HYPERTENSION WITH GOAL BLOOD PRESSURE LESS THAN 130/80: ICD-10-CM

## 2023-06-28 LAB
ANION GAP SERPL CALC-SCNC: 5 MMOL/L (ref 5–15)
BUN SERPL-MCNC: 12 MG/DL (ref 6–20)
BUN/CREAT SERPL: 11 (ref 12–20)
CALCIUM SERPL-MCNC: 9 MG/DL (ref 8.5–10.1)
CHLORIDE SERPL-SCNC: 104 MMOL/L (ref 97–108)
CO2 SERPL-SCNC: 33 MMOL/L (ref 21–32)
CREAT SERPL-MCNC: 1.11 MG/DL (ref 0.55–1.02)
GLUCOSE SERPL-MCNC: 147 MG/DL (ref 65–100)
POTASSIUM SERPL-SCNC: 3.6 MMOL/L (ref 3.5–5.1)
SODIUM SERPL-SCNC: 142 MMOL/L (ref 136–145)

## 2023-06-30 ENCOUNTER — ANCILLARY PROCEDURE (OUTPATIENT)
Age: 73
End: 2023-06-30
Payer: MEDICARE

## 2023-06-30 VITALS
BODY MASS INDEX: 37.22 KG/M2 | WEIGHT: 218 LBS | HEIGHT: 64 IN | SYSTOLIC BLOOD PRESSURE: 118 MMHG | DIASTOLIC BLOOD PRESSURE: 72 MMHG

## 2023-06-30 DIAGNOSIS — R94.31 ABNORMAL EKG: ICD-10-CM

## 2023-06-30 DIAGNOSIS — R00.2 PALPITATIONS: ICD-10-CM

## 2023-06-30 PROCEDURE — C8929 TTE W OR WO FOL WCON,DOPPLER: HCPCS

## 2023-06-30 RX ADMIN — PERFLUTREN 1.3 ML: 6.52 INJECTION, SUSPENSION INTRAVENOUS at 15:18

## 2023-07-01 LAB
ECHO AO ASC DIAM: 3.1 CM
ECHO AO ASCENDING AORTA INDEX: 1.53 CM/M2
ECHO AO ROOT DIAM: 2.9 CM
ECHO AO ROOT INDEX: 1.43 CM/M2
ECHO AV AREA PEAK VELOCITY: 2.5 CM2
ECHO AV AREA VTI: 2.5 CM2
ECHO AV AREA/BSA PEAK VELOCITY: 1.2 CM2/M2
ECHO AV AREA/BSA VTI: 1.2 CM2/M2
ECHO AV MEAN GRADIENT: 5 MMHG
ECHO AV MEAN VELOCITY: 1 M/S
ECHO AV PEAK GRADIENT: 8 MMHG
ECHO AV PEAK VELOCITY: 1.5 M/S
ECHO AV VELOCITY RATIO: 0.8
ECHO AV VTI: 26.5 CM
ECHO BSA: 2.11 M2
ECHO LA DIAMETER INDEX: 1.82 CM/M2
ECHO LA DIAMETER: 3.7 CM
ECHO LA TO AORTIC ROOT RATIO: 1.28
ECHO LV E' LATERAL VELOCITY: 5 CM/S
ECHO LV E' SEPTAL VELOCITY: 7 CM/S
ECHO LV EDV A2C: 53 ML
ECHO LV EDV A4C: 41 ML
ECHO LV EDV BP: 47 ML (ref 56–104)
ECHO LV EDV INDEX A4C: 20 ML/M2
ECHO LV EDV INDEX BP: 23 ML/M2
ECHO LV EDV NDEX A2C: 26 ML/M2
ECHO LV EJECTION FRACTION A2C: 64 %
ECHO LV EJECTION FRACTION A4C: 63 %
ECHO LV EJECTION FRACTION BIPLANE: 62 % (ref 55–100)
ECHO LV ESV A2C: 19 ML
ECHO LV ESV A4C: 15 ML
ECHO LV ESV BP: 18 ML (ref 19–49)
ECHO LV ESV INDEX A2C: 9 ML/M2
ECHO LV ESV INDEX A4C: 7 ML/M2
ECHO LV ESV INDEX BP: 9 ML/M2
ECHO LV FRACTIONAL SHORTENING: 36 % (ref 28–44)
ECHO LV INTERNAL DIMENSION DIASTOLE INDEX: 1.92 CM/M2
ECHO LV INTERNAL DIMENSION DIASTOLIC: 3.9 CM (ref 3.9–5.3)
ECHO LV INTERNAL DIMENSION SYSTOLIC INDEX: 1.23 CM/M2
ECHO LV INTERNAL DIMENSION SYSTOLIC: 2.5 CM
ECHO LV IVSD: 1.4 CM (ref 0.6–0.9)
ECHO LV MASS 2D: 201.5 G (ref 67–162)
ECHO LV MASS INDEX 2D: 99.3 G/M2 (ref 43–95)
ECHO LV POSTERIOR WALL DIASTOLIC: 1.4 CM (ref 0.6–0.9)
ECHO LV RELATIVE WALL THICKNESS RATIO: 0.72
ECHO LVOT AREA: 2.8 CM2
ECHO LVOT AV VTI INDEX: 0.84
ECHO LVOT DIAM: 1.9 CM
ECHO LVOT MEAN GRADIENT: 3 MMHG
ECHO LVOT PEAK GRADIENT: 6 MMHG
ECHO LVOT PEAK VELOCITY: 1.2 M/S
ECHO LVOT STROKE VOLUME INDEX: 31.1 ML/M2
ECHO LVOT SV: 63.2 ML
ECHO LVOT VTI: 22.3 CM
ECHO MV A VELOCITY: 0.68 M/S
ECHO MV AREA PHT: 2.6 CM2
ECHO MV E DECELERATION TIME (DT): 289.4 MS
ECHO MV E VELOCITY: 0.58 M/S
ECHO MV E/A RATIO: 0.85
ECHO MV E/E' LATERAL: 11.6
ECHO MV E/E' RATIO (AVERAGED): 9.94
ECHO MV E/E' SEPTAL: 8.29
ECHO MV PRESSURE HALF TIME (PHT): 83.9 MS
ECHO RV FREE WALL PEAK S': 13 CM/S
ECHO RV TAPSE: 1.6 CM (ref 1.7–?)

## 2023-07-05 ENCOUNTER — TELEPHONE (OUTPATIENT)
Age: 73
End: 2023-07-05

## 2023-07-05 NOTE — TELEPHONE ENCOUNTER
Can you please let her know echo is stable -- LV function normal   thanks     Spoke with patient, name and  verified. Pt verbalized understanding above message. No other question were asked.    Future Appointments   Date Time Provider [facility] [address]   2023  9:00 AM ANA Wallace - NP [facility] BS AMB   2023 10:30 AM Wanda Rivas MD [facility] BS AMB

## 2023-09-06 ENCOUNTER — OFFICE VISIT (OUTPATIENT)
Age: 73
End: 2023-09-06
Payer: MEDICARE

## 2023-09-06 VITALS
RESPIRATION RATE: 18 BRPM | SYSTOLIC BLOOD PRESSURE: 128 MMHG | OXYGEN SATURATION: 98 % | DIASTOLIC BLOOD PRESSURE: 79 MMHG | TEMPERATURE: 98.2 F | HEART RATE: 68 BPM | BODY MASS INDEX: 36.54 KG/M2 | HEIGHT: 64 IN | WEIGHT: 214 LBS

## 2023-09-06 DIAGNOSIS — E78.00 PURE HYPERCHOLESTEROLEMIA, UNSPECIFIED: ICD-10-CM

## 2023-09-06 DIAGNOSIS — R73.9 HYPERGLYCEMIA, UNSPECIFIED: ICD-10-CM

## 2023-09-06 DIAGNOSIS — J45.50 SEVERE PERSISTENT ASTHMA, UNCOMPLICATED: ICD-10-CM

## 2023-09-06 DIAGNOSIS — F41.1 GENERALIZED ANXIETY DISORDER: ICD-10-CM

## 2023-09-06 DIAGNOSIS — I10 ESSENTIAL (PRIMARY) HYPERTENSION: Primary | ICD-10-CM

## 2023-09-06 DIAGNOSIS — E55.9 VITAMIN D DEFICIENCY: ICD-10-CM

## 2023-09-06 DIAGNOSIS — K21.00 GASTROESOPHAGEAL REFLUX DISEASE WITH ESOPHAGITIS WITHOUT HEMORRHAGE: ICD-10-CM

## 2023-09-06 LAB
25(OH)D3 SERPL-MCNC: 17.8 NG/ML (ref 30–100)
ALBUMIN SERPL-MCNC: 4.1 G/DL (ref 3.5–5)
ALBUMIN/GLOB SERPL: 1.3 (ref 1.1–2.2)
ALP SERPL-CCNC: 72 U/L (ref 45–117)
ALT SERPL-CCNC: 24 U/L (ref 12–78)
ANION GAP SERPL CALC-SCNC: 7 MMOL/L (ref 5–15)
AST SERPL-CCNC: 19 U/L (ref 15–37)
BILIRUB SERPL-MCNC: 0.5 MG/DL (ref 0.2–1)
BUN SERPL-MCNC: 14 MG/DL (ref 6–20)
BUN/CREAT SERPL: 14 (ref 12–20)
CALCIUM SERPL-MCNC: 9.6 MG/DL (ref 8.5–10.1)
CHLORIDE SERPL-SCNC: 103 MMOL/L (ref 97–108)
CHOLEST SERPL-MCNC: 127 MG/DL
CO2 SERPL-SCNC: 32 MMOL/L (ref 21–32)
CREAT SERPL-MCNC: 1.02 MG/DL (ref 0.55–1.02)
GLOBULIN SER CALC-MCNC: 3.2 G/DL (ref 2–4)
GLUCOSE SERPL-MCNC: 104 MG/DL (ref 65–100)
HDLC SERPL-MCNC: 34 MG/DL
HDLC SERPL: 3.7 (ref 0–5)
LDLC SERPL CALC-MCNC: 42 MG/DL (ref 0–100)
POTASSIUM SERPL-SCNC: 3.7 MMOL/L (ref 3.5–5.1)
PROT SERPL-MCNC: 7.3 G/DL (ref 6.4–8.2)
SODIUM SERPL-SCNC: 142 MMOL/L (ref 136–145)
TRIGL SERPL-MCNC: 255 MG/DL
VLDLC SERPL CALC-MCNC: 51 MG/DL

## 2023-09-06 PROCEDURE — G8399 PT W/DXA RESULTS DOCUMENT: HCPCS | Performed by: NURSE PRACTITIONER

## 2023-09-06 PROCEDURE — 1123F ACP DISCUSS/DSCN MKR DOCD: CPT | Performed by: NURSE PRACTITIONER

## 2023-09-06 PROCEDURE — 99214 OFFICE O/P EST MOD 30 MIN: CPT | Performed by: NURSE PRACTITIONER

## 2023-09-06 PROCEDURE — 1090F PRES/ABSN URINE INCON ASSESS: CPT | Performed by: NURSE PRACTITIONER

## 2023-09-06 PROCEDURE — 3078F DIAST BP <80 MM HG: CPT | Performed by: NURSE PRACTITIONER

## 2023-09-06 PROCEDURE — G8417 CALC BMI ABV UP PARAM F/U: HCPCS | Performed by: NURSE PRACTITIONER

## 2023-09-06 PROCEDURE — 3074F SYST BP LT 130 MM HG: CPT | Performed by: NURSE PRACTITIONER

## 2023-09-06 PROCEDURE — 1036F TOBACCO NON-USER: CPT | Performed by: NURSE PRACTITIONER

## 2023-09-06 PROCEDURE — 3017F COLORECTAL CA SCREEN DOC REV: CPT | Performed by: NURSE PRACTITIONER

## 2023-09-06 PROCEDURE — G8427 DOCREV CUR MEDS BY ELIG CLIN: HCPCS | Performed by: NURSE PRACTITIONER

## 2023-09-06 SDOH — ECONOMIC STABILITY: FOOD INSECURITY: WITHIN THE PAST 12 MONTHS, THE FOOD YOU BOUGHT JUST DIDN'T LAST AND YOU DIDN'T HAVE MONEY TO GET MORE.: NEVER TRUE

## 2023-09-06 SDOH — ECONOMIC STABILITY: HOUSING INSECURITY
IN THE LAST 12 MONTHS, WAS THERE A TIME WHEN YOU DID NOT HAVE A STEADY PLACE TO SLEEP OR SLEPT IN A SHELTER (INCLUDING NOW)?: NO

## 2023-09-06 SDOH — ECONOMIC STABILITY: INCOME INSECURITY: HOW HARD IS IT FOR YOU TO PAY FOR THE VERY BASICS LIKE FOOD, HOUSING, MEDICAL CARE, AND HEATING?: NOT VERY HARD

## 2023-09-06 SDOH — ECONOMIC STABILITY: FOOD INSECURITY: WITHIN THE PAST 12 MONTHS, YOU WORRIED THAT YOUR FOOD WOULD RUN OUT BEFORE YOU GOT MONEY TO BUY MORE.: NEVER TRUE

## 2023-09-06 NOTE — PROGRESS NOTES
Chief Complaint   Patient presents with    Hypertension    Cholesterol Problem       1. Patient in office today for follow up and fasting labs. Pt has seen pulm since lov-yesterday for nucala injection; Exam was normal,follow up in [facility]. Pt has seen cardiologist since lov-July for irregular heartbeat,  Test and labs came back ok,  Follow up if needed. 2.Pt have concern regarding acid reflux episode that occurred yesterday. Pt report after taking medication,noted acid reflux resulted in sore throat and pain. Pt states sx lasted for 6hrs. Pt does treat sx with protonix daily. Denies chest pain or arm pain. Denies flutters or palpitations. Denies neck or back pain. Denies n/v/d prior to sx.
rhythm, normal S1, S2, no murmurs, rubs, clicks or gallops, normal bilateral carotid upstroke without bruits, no JVD  Abdomen - soft, nontender, nondistended, no masses or organomegaly  bowel sounds normal  Neurological - alert, oriented, normal speech, no focal findings or movement disorder noted  Extremities - no pedal edema noted, intact peripheral pulses, no edema, redness or tenderness in the calves or thighs  Skin - normal coloration and turgor, no rashes      Luis Alberto Tang, APRN - NP  9/7/2023

## 2023-09-07 LAB
EST. AVERAGE GLUCOSE BLD GHB EST-MCNC: 103 MG/DL
HBA1C MFR BLD: 5.2 % (ref 4–5.6)
TSH SERPL DL<=0.05 MIU/L-ACNC: 2.57 UIU/ML (ref 0.36–3.74)

## 2023-09-10 RX ORDER — LOSARTAN POTASSIUM 25 MG/1
TABLET ORAL
Qty: 90 TABLET | Refills: 0 | Status: SHIPPED | OUTPATIENT
Start: 2023-09-10

## 2023-09-10 RX ORDER — ERGOCALCIFEROL 1.25 MG/1
50000 CAPSULE ORAL WEEKLY
Qty: 12 CAPSULE | Refills: 1 | Status: SHIPPED | OUTPATIENT
Start: 2023-09-10

## 2023-09-10 RX ORDER — POTASSIUM CHLORIDE 20 MEQ/1
TABLET, EXTENDED RELEASE ORAL
Qty: 90 TABLET | Refills: 0 | Status: SHIPPED | OUTPATIENT
Start: 2023-09-10

## 2023-09-10 RX ORDER — LEVOCETIRIZINE DIHYDROCHLORIDE 5 MG/1
TABLET, FILM COATED ORAL
Qty: 90 TABLET | Refills: 0 | Status: SHIPPED | OUTPATIENT
Start: 2023-09-10

## 2023-09-10 RX ORDER — PAROXETINE HYDROCHLORIDE 20 MG/1
TABLET, FILM COATED ORAL
Qty: 90 TABLET | Refills: 0 | Status: SHIPPED | OUTPATIENT
Start: 2023-09-10

## 2023-09-10 RX ORDER — TRIAMTERENE AND HYDROCHLOROTHIAZIDE 37.5; 25 MG/1; MG/1
TABLET ORAL
Qty: 90 TABLET | Refills: 0 | Status: SHIPPED | OUTPATIENT
Start: 2023-09-10

## 2023-09-10 RX ORDER — MONTELUKAST SODIUM 10 MG/1
TABLET ORAL
Qty: 90 TABLET | Refills: 0 | Status: SHIPPED | OUTPATIENT
Start: 2023-09-10

## 2023-09-10 RX ORDER — ROSUVASTATIN CALCIUM 10 MG/1
TABLET, COATED ORAL
Qty: 90 TABLET | Refills: 0 | Status: SHIPPED | OUTPATIENT
Start: 2023-09-10

## 2023-09-14 DIAGNOSIS — D50.0 IRON DEFICIENCY ANEMIA DUE TO CHRONIC BLOOD LOSS: ICD-10-CM

## 2023-09-14 LAB
BASOPHILS # BLD: 0.1 K/UL (ref 0–0.1)
BASOPHILS NFR BLD: 1 % (ref 0–1)
DIFFERENTIAL METHOD BLD: ABNORMAL
EOSINOPHIL # BLD: 0.1 K/UL (ref 0–0.4)
EOSINOPHIL NFR BLD: 1 % (ref 0–7)
ERYTHROCYTE [DISTWIDTH] IN BLOOD BY AUTOMATED COUNT: 14.3 % (ref 11.5–14.5)
FERRITIN SERPL-MCNC: 15 NG/ML (ref 8–252)
HCT VFR BLD AUTO: 46.1 % (ref 35–47)
HGB BLD-MCNC: 14.7 G/DL (ref 11.5–16)
IMM GRANULOCYTES # BLD AUTO: 0 K/UL (ref 0–0.04)
IMM GRANULOCYTES NFR BLD AUTO: 0 % (ref 0–0.5)
IRON SATN MFR SERPL: 20 % (ref 20–50)
IRON SERPL-MCNC: 74 UG/DL (ref 35–150)
LYMPHOCYTES # BLD: 1.7 K/UL (ref 0.8–3.5)
LYMPHOCYTES NFR BLD: 18 % (ref 12–49)
MCH RBC QN AUTO: 26.9 PG (ref 26–34)
MCHC RBC AUTO-ENTMCNC: 31.9 G/DL (ref 30–36.5)
MCV RBC AUTO: 84.3 FL (ref 80–99)
MONOCYTES # BLD: 1 K/UL (ref 0–1)
MONOCYTES NFR BLD: 11 % (ref 5–13)
NEUTS SEG # BLD: 6.4 K/UL (ref 1.8–8)
NEUTS SEG NFR BLD: 69 % (ref 32–75)
NRBC # BLD: 0 K/UL (ref 0–0.01)
NRBC BLD-RTO: 0 PER 100 WBC
PLATELET # BLD AUTO: 236 K/UL (ref 150–400)
PMV BLD AUTO: 9.9 FL (ref 8.9–12.9)
RBC # BLD AUTO: 5.47 M/UL (ref 3.8–5.2)
TIBC SERPL-MCNC: 364 UG/DL (ref 250–450)
WBC # BLD AUTO: 9.3 K/UL (ref 3.6–11)

## 2023-09-14 NOTE — PROGRESS NOTES
[facility] at [address], [address] [place] [address],[address]  Jimmy Figures: [phone]  F: [phone]      Reason for Visit:   Kusum Real is a 72 y.o. female who is seen today for evaluation of iron deficiency anemia. History of Present Illness:   She was in the ED in April with palpitations. She had some testing with cardiology which she reports looked ok. Started prescription dose vitamin D last week from her PCP. She denies any bleeding. Bowel movements are normal, no melena. Review of systems was obtained and pertinent findings reviewed above. Past medical history, social history, family history, medications, and allergies are located in the electronic medical record. Physical Exam:   There were no vitals taken for this visit. General: alert, cooperative, no distress   Mental  status: normal mood, behavior, speech, dress, motor activity, and thought processes, able to follow commands   HENT: NCAT   Neck: no visualized mass   Resp: no respiratory distress   Neuro: no gross deficits   Skin: no discoloration or lesions of concern on visible areas   Psychiatric: normal affect, consistent with stated mood, no evidence of hallucinations     Due to this being a TeleHealth evaluation, many elements of the physical examination are unable to be assessed. Evaluation of the following organ systems was limited: Vitals/Constitutional/EENT/Resp/CV/GI//MS/Neuro/Skin/Heme-Lymph-Imm.       Results:     Lab Results   Component Value Date    WBC 9.3 09/14/2023    HGB 14.7 09/14/2023    HCT 46.1 09/14/2023     09/14/2023    MCV 84.3 09/14/2023    NEUTROABS 6.4 09/14/2023     Lab Results   Component Value Date     09/06/2023    K 3.7 09/06/2023     09/06/2023    CO2 32 09/06/2023    GLUCOSE 104 (H) 09/06/2023    BUN 14 09/06/2023    CREATININE 1.02 09/06/2023    LABGLOM 59 (L) 09/06/2023    CALCIUM 9.6 09/06/2023    MG 2.1 04/18/2023     Lab Results

## 2023-09-21 ENCOUNTER — TELEMEDICINE (OUTPATIENT)
Age: 73
End: 2023-09-21
Payer: MEDICARE

## 2023-09-21 DIAGNOSIS — D50.0 IRON DEFICIENCY ANEMIA SECONDARY TO BLOOD LOSS (CHRONIC): Primary | ICD-10-CM

## 2023-09-21 PROCEDURE — 3017F COLORECTAL CA SCREEN DOC REV: CPT | Performed by: INTERNAL MEDICINE

## 2023-09-21 PROCEDURE — G8417 CALC BMI ABV UP PARAM F/U: HCPCS | Performed by: INTERNAL MEDICINE

## 2023-09-21 PROCEDURE — 99214 OFFICE O/P EST MOD 30 MIN: CPT | Performed by: INTERNAL MEDICINE

## 2023-09-21 PROCEDURE — G8428 CUR MEDS NOT DOCUMENT: HCPCS | Performed by: INTERNAL MEDICINE

## 2023-09-21 PROCEDURE — 1036F TOBACCO NON-USER: CPT | Performed by: INTERNAL MEDICINE

## 2023-09-21 PROCEDURE — 1123F ACP DISCUSS/DSCN MKR DOCD: CPT | Performed by: INTERNAL MEDICINE

## 2023-09-21 PROCEDURE — 1090F PRES/ABSN URINE INCON ASSESS: CPT | Performed by: INTERNAL MEDICINE

## 2023-09-21 PROCEDURE — G8399 PT W/DXA RESULTS DOCUMENT: HCPCS | Performed by: INTERNAL MEDICINE

## 2023-09-21 NOTE — PROGRESS NOTES
Dot Dys is a 72 y.o. femalel follow up for for anemia. 1. Have you been to the ER, urgent care clinic since your last visit? Hospitalized since your last visit?no    2. Have you seen or consulted any other health care providers outside of the [address] [place] [address] since your last visit? Include any pap smears or colon screening.  no

## 2023-09-21 NOTE — PATIENT INSTRUCTIONS
Thank you for participating in the virtual visit with Dr. Severa Furnish. We will call you soon to schedule a follow up appointment with us in 1 year. If you do not hear from us, please call us at [phone] to schedule your appointment. Please use the enclosed lab slip to have your labs done before your next appointment.

## 2023-12-08 RX ORDER — POTASSIUM CHLORIDE 20 MEQ/1
TABLET, EXTENDED RELEASE ORAL
Qty: 90 TABLET | Refills: 0 | Status: SHIPPED | OUTPATIENT
Start: 2023-12-08

## 2023-12-08 RX ORDER — TRIAMTERENE AND HYDROCHLOROTHIAZIDE 37.5; 25 MG/1; MG/1
TABLET ORAL
Qty: 90 TABLET | Refills: 0 | Status: SHIPPED | OUTPATIENT
Start: 2023-12-08

## 2023-12-08 RX ORDER — ROSUVASTATIN CALCIUM 10 MG/1
TABLET, COATED ORAL
Qty: 90 TABLET | Refills: 0 | Status: SHIPPED | OUTPATIENT
Start: 2023-12-08

## 2023-12-08 RX ORDER — MONTELUKAST SODIUM 10 MG/1
TABLET ORAL
Qty: 90 TABLET | Refills: 0 | Status: SHIPPED | OUTPATIENT
Start: 2023-12-08

## 2023-12-08 RX ORDER — LEVOCETIRIZINE DIHYDROCHLORIDE 5 MG/1
TABLET, FILM COATED ORAL
Qty: 90 TABLET | Refills: 0 | Status: SHIPPED | OUTPATIENT
Start: 2023-12-08

## 2023-12-08 RX ORDER — LOSARTAN POTASSIUM 25 MG/1
TABLET ORAL
Qty: 90 TABLET | Refills: 0 | Status: SHIPPED | OUTPATIENT
Start: 2023-12-08

## 2023-12-11 ENCOUNTER — TELEPHONE (OUTPATIENT)
Age: 73
End: 2023-12-11

## 2023-12-11 RX ORDER — PANTOPRAZOLE SODIUM 40 MG/1
40 TABLET, DELAYED RELEASE ORAL DAILY
Qty: 90 TABLET | Refills: 3 | Status: SHIPPED | OUTPATIENT
Start: 2023-12-11

## 2023-12-11 NOTE — TELEPHONE ENCOUNTER
Teddy Grossman needs a refill of Pantoprazole. They have 0 pills/supply left and are requesting a ? day supply with refills. Pharmacy has been updated in the chart. Patient was advised or scheduled an appointment for the future and to request refills thru the [employer] or by requesting a refill from their pharmacy in the future. Patient was also advised to check with their pharmacy for status of when refills are available.

## 2024-01-30 ENCOUNTER — TRANSCRIBE ORDERS (OUTPATIENT)
Facility: HOSPITAL | Age: 74
End: 2024-01-30

## 2024-01-30 DIAGNOSIS — Z12.31 VISIT FOR SCREENING MAMMOGRAM: Primary | ICD-10-CM

## 2024-01-31 ENCOUNTER — HOSPITAL ENCOUNTER (OUTPATIENT)
Facility: HOSPITAL | Age: 74
Discharge: HOME | End: 2024-02-03
Payer: MEDICARE

## 2024-01-31 DIAGNOSIS — Z12.31 VISIT FOR SCREENING MAMMOGRAM: ICD-10-CM

## 2024-01-31 PROCEDURE — 77063 BREAST TOMOSYNTHESIS BI: CPT

## 2024-03-05 ENCOUNTER — HOSPITAL ENCOUNTER (OUTPATIENT)
Facility: HOSPITAL | Age: 74
Setting detail: OUTPATIENT SURGERY
Discharge: HOME | End: 2024-03-05
Attending: SPECIALIST | Admitting: SPECIALIST
Payer: MEDICARE

## 2024-03-05 ENCOUNTER — ANESTHESIA (OUTPATIENT)
Facility: HOSPITAL | Age: 74
End: 2024-03-05
Payer: MEDICARE

## 2024-03-05 ENCOUNTER — ANESTHESIA EVENT (OUTPATIENT)
Facility: HOSPITAL | Age: 74
End: 2024-03-05
Payer: MEDICARE

## 2024-03-05 VITALS
OXYGEN SATURATION: 97 % | HEIGHT: 64 IN | HEART RATE: 58 BPM | WEIGHT: 211.64 LBS | SYSTOLIC BLOOD PRESSURE: 117 MMHG | BODY MASS INDEX: 36.13 KG/M2 | DIASTOLIC BLOOD PRESSURE: 67 MMHG | RESPIRATION RATE: 14 BRPM | TEMPERATURE: 98.1 F

## 2024-03-05 PROCEDURE — 2500000003 HC RX 250 WO HCPCS: Performed by: NURSE ANESTHETIST, CERTIFIED REGISTERED

## 2024-03-05 PROCEDURE — 3600007512: Performed by: SPECIALIST

## 2024-03-05 PROCEDURE — 7100000011 HC PHASE II RECOVERY - ADDTL 15 MIN: Performed by: SPECIALIST

## 2024-03-05 PROCEDURE — 3700000000 HC ANESTHESIA ATTENDED CARE: Performed by: SPECIALIST

## 2024-03-05 PROCEDURE — C1889 IMPLANT/INSERT DEVICE, NOC: HCPCS | Performed by: SPECIALIST

## 2024-03-05 PROCEDURE — 7100000010 HC PHASE II RECOVERY - FIRST 15 MIN: Performed by: SPECIALIST

## 2024-03-05 PROCEDURE — 88305 TISSUE EXAM BY PATHOLOGIST: CPT

## 2024-03-05 PROCEDURE — 2720000010 HC SURG SUPPLY STERILE: Performed by: SPECIALIST

## 2024-03-05 PROCEDURE — 6360000002 HC RX W HCPCS: Performed by: NURSE ANESTHETIST, CERTIFIED REGISTERED

## 2024-03-05 PROCEDURE — 2580000003 HC RX 258: Performed by: SPECIALIST

## 2024-03-05 PROCEDURE — 3600007502: Performed by: SPECIALIST

## 2024-03-05 PROCEDURE — 3700000001 HC ADD 15 MINUTES (ANESTHESIA): Performed by: SPECIALIST

## 2024-03-05 DEVICE — WORKING LENGTH 235CM, WORKING CHANNEL 2.8MM
Type: IMPLANTABLE DEVICE | Site: CECUM | Status: FUNCTIONAL
Brand: RESOLUTION 360 CLIP

## 2024-03-05 RX ORDER — SODIUM CHLORIDE 9 MG/ML
INJECTION, SOLUTION INTRAVENOUS CONTINUOUS
Status: DISCONTINUED | OUTPATIENT
Start: 2024-03-05 | End: 2024-03-05 | Stop reason: HOSPADM

## 2024-03-05 RX ORDER — PROPOFOL 10 MG/ML
INJECTION, EMULSION INTRAVENOUS PRN
Status: DISCONTINUED | OUTPATIENT
Start: 2024-03-05 | End: 2024-03-05 | Stop reason: SDUPTHER

## 2024-03-05 RX ORDER — BUDESONIDE AND FORMOTEROL FUMARATE DIHYDRATE 160; 4.5 UG/1; UG/1
2 AEROSOL RESPIRATORY (INHALATION) DAILY
COMMUNITY
Start: 2020-10-27

## 2024-03-05 RX ORDER — BENRALIZUMAB 30 MG/ML
30 INJECTION, SOLUTION SUBCUTANEOUS
COMMUNITY

## 2024-03-05 RX ORDER — SIMETHICONE 40MG/0.6ML
40 SUSPENSION, DROPS(FINAL DOSAGE FORM)(ML) ORAL
Status: DISCONTINUED | OUTPATIENT
Start: 2024-03-05 | End: 2024-03-05 | Stop reason: HOSPADM

## 2024-03-05 RX ORDER — SODIUM CHLORIDE 0.9 % (FLUSH) 0.9 %
5-40 SYRINGE (ML) INJECTION PRN
Status: DISCONTINUED | OUTPATIENT
Start: 2024-03-05 | End: 2024-03-05 | Stop reason: HOSPADM

## 2024-03-05 RX ADMIN — LIDOCAINE HYDROCHLORIDE 80 MG: 20 INJECTION, SOLUTION INFILTRATION; PERINEURAL at 11:34

## 2024-03-05 RX ADMIN — SODIUM CHLORIDE: 9 INJECTION, SOLUTION INTRAVENOUS at 11:21

## 2024-03-05 RX ADMIN — PROPOFOL 50 MG: 10 INJECTION, EMULSION INTRAVENOUS at 11:34

## 2024-03-05 RX ADMIN — PROPOFOL 180 MCG/KG/MIN: 10 INJECTION, EMULSION INTRAVENOUS at 11:35

## 2024-03-05 ASSESSMENT — PAIN SCALES - GENERAL
PAINLEVEL_OUTOF10: 0

## 2024-03-05 ASSESSMENT — COPD QUESTIONNAIRES: CAT_SEVERITY: MODERATE

## 2024-03-05 ASSESSMENT — PAIN - FUNCTIONAL ASSESSMENT: PAIN_FUNCTIONAL_ASSESSMENT: 0-10

## 2024-03-05 NOTE — PROGRESS NOTES
Endoscopy discharge instructions have been reviewed and given to patient.  The patient verbalized understanding and acceptance of instructions.      Dr. Milner  discussed with patient procedure findings and next steps.

## 2024-03-05 NOTE — DISCHARGE INSTRUCTIONS
ulcer or serious problems.  I did take biopsies from the stomach and esophagus and I'll reach out with the polyp/biopsy results in 10-14 days     It was an honor to be your doctor today.  Please let me or my office staff know if you have any feedback about today's procedure.    Kim Milner MD    Colonoscopy saves lives, and can prevent colon cancer.  Everyone aged 50 or older needs colonoscopy.  Tell your family and friends: get the test!

## 2024-03-05 NOTE — OP NOTE
[facility]  [facility]  Cosmo Milner MD  [phone]      2024    Esophagogastroduodenoscopy & Colonoscopy Procedure Note  Leonor Soriano  : 1950  [facility] Medical Record Number: [id number]      Indications:   GERD Iron deficiency anemia  Referring Physician:  Riki George, APRN - NP  Anesthesia/Sedation: Conscious Sedation/Moderate Sedation/MAC  Endoscopist:  Dr. Cosmo Milner  Complications:  None  Estimated Blood Loss:  None    Permit:  The indications, risks, benefits and alternatives were reviewed with the patient or their decision maker who was provided an opportunity to ask questions and all questions were answered.  The specific risks of esophagogastroduodenoscopy with conscious sedation were reviewed, including but not limited to anesthetic complication, bleeding, adverse drug reaction, missed lesion, infection, IV site reactions, and intestinal perforation which would lead to the need for surgical repair.  Alternatives to EGD and colonoscopy including radiographic imaging, observation without testing, or laboratory testing were reviewed as well as the limitations of those alternatives discussed.  After considering the options and having all their questions answered, the patient or their decision maker provided both verbal and written consent to proceed.      -----------EGD------------   Procedure in Detail:  After obtaining informed consent, positioning of the patient in the left lateral decubitus position, and conduction of a pre-procedure pause or \"time out\" the endoscope was introduced into the mouth and advanced to the duodenum.  A careful inspection was made, and findings or interventions are described below.    Findings:   Esophagus:normal- cold forceps biopsies of mid esophagus taken for histology  Stomach: Diffuse erythema, cold forceps biopsies taken for

## 2024-03-05 NOTE — PROGRESS NOTES
Leonor LAO [facility]  1950  [id number]    Situation:  Verbal report received from:   MARIO Munoz   Procedure: Procedure(s) with comments:  COLONOSCOPY  ESOPHAGOGASTRODUODENOSCOPY  ESOPHAGOGASTRODUODENOSCOPY BIOPSY  COLONOSCOPY CONTROL HEMORRHAGE - APC and hemoclip x1 in cecum  COLONOSCOPY POLYPECTOMY SNARE/COLD BIOPSY    Background:    Preoperative diagnosis: Diarrhea, unspecified type [R19.7]  Personal history of colonic polyps [Z86.010]  Special screening for malignant neoplasms, colon [Z12.11]  Abdominal pain, generalized [R10.84]  Postoperative diagnosis: * No post-op diagnosis entered *    :  Dr. Milner   Assistant(s): Circulator: Alexander Zuniga RN  Endoscopy Technician: Srini Robles; María Ryan    Specimens:   ID Type Source Tests Collected by Time Destination   1 : gastric antrum bx Tissue Gastric SURGICAL PATHOLOGY Cosmo Milner MD 3/5/2024 1139    2 : mid esophagus bx Tissue Esophagus SURGICAL PATHOLOGY Cosmo Milner MD 3/5/2024 1140    3 : descending colon polyp Tissue Colon-Descending SURGICAL PATHOLOGY Cosmo Milner MD 3/5/2024 1157      H. Pylori    no    Assessment:  Intra-procedure medications     Anesthesia gave intra-procedure sedation and medications, see anesthesia flow sheet   yes    Intravenous fluids: NS@ KVO     Vital signs stable   yes    Abdominal assessment: round and soft   yes    Recommendation:  Discharge patient per MD order  yes.  Return to floor  outpatient  Family or Friend   family   Permission to share finding with family or friend   yes

## 2024-03-05 NOTE — H&P
Pre-Endoscopy H&P Update  Chief complaint/HPI/ROS:  The indication for the procedure, the patient's history and the patient's current medications are reviewed prior to the procedure and that data is reported on the H&P to which this document is attached.  Any significant complaints with regard to organ systems will be noted, and if not mentioned then a review of systems is not contributory.  Past Medical History:   Diagnosis Date    Anemia     Arthritis     knee right    Asthma     Bradycardia 2016    Chronic obstructive pulmonary disease (HCC)     GERD (gastroesophageal reflux disease)     Hypercholesterolemia     Hypertension     Obesity (BMI 30-39.9)     Psychiatric disorder     depression/ not currently taking    Vertigo     Vestibulopathy 2016      Past Surgical History:   Procedure Laterality Date    COLONOSCOPY N/A 2020    COLONOSCOPY performed by Cosmo Milner MD at [facility] ENDOSCOPY    COLONOSCOPY  10/25/2011    1 polyp which was removed, diverticulosis in distal descending colon & sigmoid colon.    COLONOSCOPY N/A 2018    COLONOSCOPY performed by Cosmo Milner MD at [facility] ENDOSCOPY    GYN  1996    hysterectomy    HYSTERECTOMY (CERVIX STATUS UNKNOWN)      ORTHOPEDIC SURGERY Left     foot surgery    OTHER SURGICAL HISTORY  ?    L Lower Abd. Basal Cell CA    TONSILLECTOMY      UPPER GASTROINTESTINAL ENDOSCOPY  2014     Social   Social History     Tobacco Use    Smoking status: Former     Current packs/day: 0.00     Average packs/day: 1 pack/day for 15.0 years (15.0 ttl pk-yrs)     Types: Cigarettes     Start date: 1973     Quit date: 1988     Years since quittin.6    Smokeless tobacco: Never   Substance Use Topics    Alcohol use: Yes     Alcohol/week: 2.0 standard drinks of alcohol     Types: 2 Shots of liquor per week      Family History   Problem Relation Age of Onset    Hypertension Mother     Cancer Father     Stroke Maternal Grandmother     Colon

## 2024-03-05 NOTE — ANESTHESIA PRE PROCEDURE
Department of Anesthesiology  Preprocedure Note       Name:  Leonor Soriano   Age:  73 y.o.  :  1950                                          MRN:  [id number]         Date:  3/5/2024      Surgeon: Surgeon(s):  Cosmo Milner MD    Procedure: Procedure(s):  COLONOSCOPY  ESOPHAGOGASTRODUODENOSCOPY    Medications prior to admission:   Prior to Admission medications    Medication Sig Start Date End Date Taking? Authorizing Provider   SYMBICORT 160-4.5 MCG/ACT AERO Inhale 2 puffs into the lungs daily 10/27/20  Yes Provider, MD Alesha   benralizumab (FASENRA) 30 MG/ML SOSY injection Inject 1 mL into the skin every 30 days   Yes Provider, MD Alesha   pantoprazole (PROTONIX) 40 MG tablet Take 1 tablet by mouth daily 23   Riki George, APRN - NP   rosuvastatin (CRESTOR) 10 MG tablet Take 1 tablet by mouth nightly 23   Riki George APRN - NP   levocetirizine (XYZAL) 5 MG tablet Take 1 tablet by mouth once daily 23   Riki George, APRN - NP   losartan (COZAAR) 25 MG tablet Take 1 tablet by mouth once daily 23   Riki George APRN - NP   triamterene-hydroCHLOROthiazide (MAXZIDE-25) 37.5-25 MG per tablet Take 1 tablet by mouth once daily 23   Riki George APRN - NP   montelukast (SINGULAIR) 10 MG tablet Take 1 tablet by mouth once daily 23   Riki George, APRN - NP   potassium chloride (KLOR-CON M) 20 MEQ extended release tablet TAKE 1  BY MOUTH ONCE DAILY 23   Riki George APRN - NP   PARoxetine (PAXIL) 20 MG tablet Take 1 tablet by mouth once daily 9/10/23   Riki George APRN - NP   vitamin D (ERGOCALCIFEROL) 1.25 MG (03292 UT) CAPS capsule Take 1 capsule by mouth once a week  Patient not taking: Reported on 3/5/2024 9/10/23   Tassell, Riki Q, APRN - NP   diclofenac sodium (VOLTAREN) 1 % GEL Apply topically 4 times daily as needed 22   Automatic Reconciliation, Ar   ipratropium (ATROVENT) 0.02 % nebulizer solution

## 2024-03-05 NOTE — PROGRESS NOTES

## 2024-03-05 NOTE — H&P
Seen in the office with complaints dysphagia, noted iron deficiency and GERD.  For EGD/colonoscopy.  I cannot copy office notes today due to wifi failure in hospital.  Cosmo Milner MD

## 2024-03-07 ENCOUNTER — TELEPHONE (OUTPATIENT)
Age: 74
End: 2024-03-07

## 2024-03-07 NOTE — TELEPHONE ENCOUNTER
----- Message from Ofelia Cunningham sent at 3/7/2024 11:05 AM EST -----  Subject: Refill Request    QUESTIONS  Name of Medication? pantoprazole (PROTONIX) 40 MG tablet  Patient-reported dosage and instructions? 40 MG Once daily   How many days do you have left? 4  Preferred Pharmacy? [facility]  Pharmacy phone number (if available)? [phone]  ---------------------------------------------------------------------------  --------------  CALL BACK INFO  What is the best way for the office to contact you? OK to leave message on   voicemail  Preferred Call Back Phone Number? [phone]  ---------------------------------------------------------------------------  --------------  SCRIPT ANSWERS  Relationship to Patient? Self

## 2024-03-08 RX ORDER — PANTOPRAZOLE SODIUM 40 MG/1
40 TABLET, DELAYED RELEASE ORAL DAILY
Qty: 90 TABLET | Refills: 3 | Status: SHIPPED | OUTPATIENT
Start: 2024-03-08

## 2024-03-22 RX ORDER — LOSARTAN POTASSIUM 25 MG/1
TABLET ORAL
Qty: 90 TABLET | Refills: 0 | Status: SHIPPED | OUTPATIENT
Start: 2024-03-22

## 2024-03-22 RX ORDER — TRIAMTERENE AND HYDROCHLOROTHIAZIDE 37.5; 25 MG/1; MG/1
TABLET ORAL
Qty: 90 TABLET | Refills: 0 | Status: SHIPPED | OUTPATIENT
Start: 2024-03-22

## 2024-03-22 RX ORDER — POTASSIUM CHLORIDE 20 MEQ/1
TABLET, EXTENDED RELEASE ORAL
Qty: 90 TABLET | Refills: 0 | Status: SHIPPED | OUTPATIENT
Start: 2024-03-22

## 2024-03-22 RX ORDER — LEVOCETIRIZINE DIHYDROCHLORIDE 5 MG/1
TABLET, FILM COATED ORAL
Qty: 90 TABLET | Refills: 0 | Status: SHIPPED | OUTPATIENT
Start: 2024-03-22

## 2024-03-22 RX ORDER — MONTELUKAST SODIUM 10 MG/1
TABLET ORAL
Qty: 90 TABLET | Refills: 0 | Status: SHIPPED | OUTPATIENT
Start: 2024-03-22

## 2024-03-22 RX ORDER — ROSUVASTATIN CALCIUM 10 MG/1
TABLET, COATED ORAL
Qty: 90 TABLET | Refills: 0 | Status: SHIPPED | OUTPATIENT
Start: 2024-03-22

## 2024-03-22 RX ORDER — PAROXETINE HYDROCHLORIDE 20 MG/1
TABLET, FILM COATED ORAL
Qty: 90 TABLET | Refills: 0 | Status: SHIPPED | OUTPATIENT
Start: 2024-03-22

## 2024-05-30 ENCOUNTER — OFFICE VISIT (OUTPATIENT)
Age: 74
End: 2024-05-30
Payer: MEDICARE

## 2024-05-30 VITALS
SYSTOLIC BLOOD PRESSURE: 123 MMHG | RESPIRATION RATE: 18 BRPM | BODY MASS INDEX: 35.85 KG/M2 | HEART RATE: 60 BPM | HEIGHT: 64 IN | WEIGHT: 210 LBS | TEMPERATURE: 98.1 F | DIASTOLIC BLOOD PRESSURE: 77 MMHG | OXYGEN SATURATION: 98 %

## 2024-05-30 DIAGNOSIS — I10 ESSENTIAL (PRIMARY) HYPERTENSION: ICD-10-CM

## 2024-05-30 DIAGNOSIS — J45.50 SEVERE PERSISTENT ASTHMA, UNCOMPLICATED: ICD-10-CM

## 2024-05-30 DIAGNOSIS — B96.89 ACUTE BACTERIAL SINUSITIS: Primary | ICD-10-CM

## 2024-05-30 DIAGNOSIS — E66.01 SEVERE OBESITY (BMI 35.0-39.9) WITH COMORBIDITY (HCC): ICD-10-CM

## 2024-05-30 DIAGNOSIS — J01.90 ACUTE BACTERIAL SINUSITIS: Primary | ICD-10-CM

## 2024-05-30 DIAGNOSIS — E78.00 PURE HYPERCHOLESTEROLEMIA, UNSPECIFIED: ICD-10-CM

## 2024-05-30 PROCEDURE — 1123F ACP DISCUSS/DSCN MKR DOCD: CPT | Performed by: NURSE PRACTITIONER

## 2024-05-30 PROCEDURE — 99214 OFFICE O/P EST MOD 30 MIN: CPT | Performed by: NURSE PRACTITIONER

## 2024-05-30 PROCEDURE — 3017F COLORECTAL CA SCREEN DOC REV: CPT | Performed by: NURSE PRACTITIONER

## 2024-05-30 PROCEDURE — G8399 PT W/DXA RESULTS DOCUMENT: HCPCS | Performed by: NURSE PRACTITIONER

## 2024-05-30 PROCEDURE — 3078F DIAST BP <80 MM HG: CPT | Performed by: NURSE PRACTITIONER

## 2024-05-30 PROCEDURE — 3074F SYST BP LT 130 MM HG: CPT | Performed by: NURSE PRACTITIONER

## 2024-05-30 PROCEDURE — G8427 DOCREV CUR MEDS BY ELIG CLIN: HCPCS | Performed by: NURSE PRACTITIONER

## 2024-05-30 PROCEDURE — 1090F PRES/ABSN URINE INCON ASSESS: CPT | Performed by: NURSE PRACTITIONER

## 2024-05-30 PROCEDURE — G8417 CALC BMI ABV UP PARAM F/U: HCPCS | Performed by: NURSE PRACTITIONER

## 2024-05-30 PROCEDURE — 1036F TOBACCO NON-USER: CPT | Performed by: NURSE PRACTITIONER

## 2024-05-30 RX ORDER — LEVOCETIRIZINE DIHYDROCHLORIDE 5 MG/1
5 TABLET, FILM COATED ORAL DAILY
Qty: 90 TABLET | Refills: 1 | Status: SHIPPED | OUTPATIENT
Start: 2024-05-30

## 2024-05-30 RX ORDER — CEFDINIR 300 MG/1
300 CAPSULE ORAL 2 TIMES DAILY
Qty: 20 CAPSULE | Refills: 0 | Status: SHIPPED | OUTPATIENT
Start: 2024-05-30 | End: 2024-06-09

## 2024-05-30 RX ORDER — MONTELUKAST SODIUM 10 MG/1
10 TABLET ORAL DAILY
Qty: 90 TABLET | Refills: 1 | Status: SHIPPED | OUTPATIENT
Start: 2024-05-30

## 2024-05-30 RX ORDER — ROSUVASTATIN CALCIUM 10 MG/1
10 TABLET, COATED ORAL NIGHTLY
Qty: 90 TABLET | Refills: 1 | Status: SHIPPED | OUTPATIENT
Start: 2024-05-30

## 2024-05-30 RX ORDER — TRIAMTERENE AND HYDROCHLOROTHIAZIDE 37.5; 25 MG/1; MG/1
1 TABLET ORAL DAILY
Qty: 90 TABLET | Refills: 1 | Status: SHIPPED | OUTPATIENT
Start: 2024-05-30

## 2024-05-30 RX ORDER — LOSARTAN POTASSIUM 25 MG/1
25 TABLET ORAL DAILY
Qty: 90 TABLET | Refills: 1 | Status: SHIPPED | OUTPATIENT
Start: 2024-05-30

## 2024-05-30 RX ORDER — PANTOPRAZOLE SODIUM 40 MG/1
40 TABLET, DELAYED RELEASE ORAL DAILY
Qty: 90 TABLET | Refills: 3 | Status: SHIPPED | OUTPATIENT
Start: 2024-05-30

## 2024-05-30 RX ORDER — PAROXETINE HYDROCHLORIDE 20 MG/1
20 TABLET, FILM COATED ORAL DAILY
Qty: 90 TABLET | Refills: 1 | Status: SHIPPED | OUTPATIENT
Start: 2024-05-30

## 2024-05-30 RX ORDER — BENRALIZUMAB 30 MG/ML
30 INJECTION, SOLUTION SUBCUTANEOUS
Qty: 0.28 ML | COMMUNITY
Start: 2024-05-30

## 2024-05-30 RX ORDER — POTASSIUM CHLORIDE 20 MEQ/1
TABLET, EXTENDED RELEASE ORAL
Qty: 90 TABLET | Refills: 0 | COMMUNITY
Start: 2024-05-30

## 2024-05-30 ASSESSMENT — PATIENT HEALTH QUESTIONNAIRE - PHQ9
SUM OF ALL RESPONSES TO PHQ QUESTIONS 1-9: 0
SUM OF ALL RESPONSES TO PHQ QUESTIONS 1-9: 0
2. FEELING DOWN, DEPRESSED OR HOPELESS: NOT AT ALL
SUM OF ALL RESPONSES TO PHQ QUESTIONS 1-9: 0
1. LITTLE INTEREST OR PLEASURE IN DOING THINGS: NOT AT ALL
SUM OF ALL RESPONSES TO PHQ9 QUESTIONS 1 & 2: 0
SUM OF ALL RESPONSES TO PHQ QUESTIONS 1-9: 0

## 2024-05-30 NOTE — PROGRESS NOTES
Leonor Soriano (:  1950) is a 74 y.o. female, Established patient, here for evaluation of the following chief complaint(s):  Follow-up and Medication Refill         Assessment & Plan  1. Sinus infection.  Omnicef has been prescribed for a duration of 10 days.    2. Health maintenance.  The patient's blood pressure is well-regulated. Fasting labs have been scheduled for early 2024.    Follow-up  A follow-up appointment is scheduled for early 2024 for a Medicare wellness visit.    Results    1. Acute bacterial sinusitis  -     cefdinir (OMNICEF) 300 MG capsule; Take 1 capsule by mouth 2 times daily for 10 days, Disp-20 capsule, R-0Normal  2. Severe obesity (BMI 35.0-39.9) with comorbidity (HCC)  3. Severe persistent asthma, uncomplicated  Assessment & Plan:   Well-controlled, continue current medications  Orders:  -     levocetirizine (XYZAL) 5 MG tablet; Take 1 tablet by mouth daily, Disp-90 tablet, R-1Normal  -     montelukast (SINGULAIR) 10 MG tablet; Take 1 tablet by mouth daily, Disp-90 tablet, R-1Normal  4. Essential (primary) hypertension  -     losartan (COZAAR) 25 MG tablet; Take 1 tablet by mouth daily, Disp-90 tablet, R-1Normal  5. Pure hypercholesterolemia, unspecified  -     rosuvastatin (CRESTOR) 10 MG tablet; Take 1 tablet by mouth nightly, Disp-90 tablet, R-1Normal    Return for early Sept fasting labs and MWV.       Subjective   History of Present Illness  The patient presents for evaluation of multiple medical concerns.    The patient consumed grits with cheese, coffee, and string cheese at 9 a.m. today.    The patient is experiencing sinus issues, characterized by nasal congestion and pressure, accompanied by yellow-green discharge. She also reports a sensation akin to bubbles in her ears. She denies experiencing any chest discomfort. She administered Advil Cold and Sinus this morning.    The patient monitors her blood pressure at home only when symptomatic.    Supplemental

## 2024-05-30 NOTE — PROGRESS NOTES
Chief Complaint   Patient presents with    Follow-up    Medication Refill     Patient is in the office today for a f/u and med refills.  Patient stated that she has been sinus problems for a couple of days.  No other concerns.      \"Have you been to the ER, urgent care clinic since your last visit?  Hospitalized since your last visit?\"    NO    “Have you seen or consulted any other health care providers outside of [facility] since your last visit?”    NO            Click Here for Release of Records Request

## 2024-07-02 ENCOUNTER — TELEPHONE (OUTPATIENT)
Age: 74
End: 2024-07-02

## 2024-07-02 DIAGNOSIS — I10 ESSENTIAL (PRIMARY) HYPERTENSION: ICD-10-CM

## 2024-07-02 RX ORDER — POTASSIUM CHLORIDE 20 MEQ/1
TABLET, EXTENDED RELEASE ORAL
Qty: 90 TABLET | Refills: 0 | Status: SHIPPED | OUTPATIENT
Start: 2024-07-02

## 2024-07-02 NOTE — TELEPHONE ENCOUNTER
Leonor Soriano needs a refill of Klor-Con.  They have 0 pills/supply left and are requesting a 90 day supply with refills.  Pharmacy has been updated in the chart. Patient was advised or scheduled an appointment for the future and to request refills thru the [employer] or by requesting a refill from their pharmacy in the future.  Patient was also advised to check with their pharmacy for status of when refills are available.     Patient states that these were not called in  back in may. Please send to [facility] on [address]

## 2024-07-16 NOTE — TELEPHONE ENCOUNTER
[employer]  Medical Oncology at [facility]    07/10/17 Phone call placed to pt to remind pt to have labs drawn prior to her follow up appointment with . Voicemail left for patient. I have ordered the following medication. Please notify the patient.    Medications Ordered This Encounter   Medications    dicyclomine (BENTYL) 20 mg tablet     Sig: Take 1 tablet (20 mg total) by mouth 4 (four) times daily before meals and nightly.     Dispense:  360 tablet     Refill:  3

## 2024-08-29 ENCOUNTER — TELEPHONE (OUTPATIENT)
Age: 74
End: 2024-08-29

## 2024-08-29 NOTE — TELEPHONE ENCOUNTER
I can not order labs prior to an appointment because I need to be able to bill against a physical for it. Preethi

## 2024-08-29 NOTE — TELEPHONE ENCOUNTER
Patient came in and states that she has an appointment on 09/05/2024. She states that she needs labs put in so that she can have them drawn at [facility] because she has to get labs done for Dr. Pedraza also. Advised patient that we do not put in labs before an appointment. Patient insisted. Please call her at [phone]

## 2024-08-29 NOTE — TELEPHONE ENCOUNTER
Called and spoke with the patient, informed patient that she would have to bring her orders from the cancer doctor and get all labs drawn here.

## 2024-08-30 ENCOUNTER — TELEPHONE (OUTPATIENT)
Age: 74
End: 2024-08-30

## 2024-08-30 NOTE — TELEPHONE ENCOUNTER
Spoke to patient. Advised okay to have labs done through PCP. Confirmed ordered date and labs that were needed. No further questions at this time.

## 2024-08-30 NOTE — TELEPHONE ENCOUNTER
Pt called in stating she has an upcoming appt with her pcp. Pt wanted to know if the labs she's needing to have done for , can she have them drawn by her pcp.  Please advise     CB# [phone]

## 2024-09-10 ENCOUNTER — TELEPHONE (OUTPATIENT)
Age: 74
End: 2024-09-10

## 2024-09-10 DIAGNOSIS — D50.0 IRON DEFICIENCY ANEMIA SECONDARY TO BLOOD LOSS (CHRONIC): Primary | ICD-10-CM

## 2024-09-30 DIAGNOSIS — I10 ESSENTIAL (PRIMARY) HYPERTENSION: ICD-10-CM

## 2024-09-30 RX ORDER — POTASSIUM CHLORIDE 1500 MG/1
TABLET, EXTENDED RELEASE ORAL
Qty: 90 TABLET | Refills: 0 | Status: SHIPPED | OUTPATIENT
Start: 2024-09-30

## 2024-10-01 ENCOUNTER — OFFICE VISIT (OUTPATIENT)
Age: 74
End: 2024-10-01
Payer: MEDICARE

## 2024-10-01 VITALS
OXYGEN SATURATION: 97 % | TEMPERATURE: 98.1 F | DIASTOLIC BLOOD PRESSURE: 77 MMHG | RESPIRATION RATE: 19 BRPM | BODY MASS INDEX: 35.85 KG/M2 | SYSTOLIC BLOOD PRESSURE: 112 MMHG | WEIGHT: 210 LBS | HEART RATE: 76 BPM | HEIGHT: 64 IN

## 2024-10-01 DIAGNOSIS — Z00.00 ENCOUNTER FOR GENERAL ADULT MEDICAL EXAMINATION WITHOUT ABNORMAL FINDINGS: Primary | ICD-10-CM

## 2024-10-01 DIAGNOSIS — R73.9 HYPERGLYCEMIA, UNSPECIFIED: ICD-10-CM

## 2024-10-01 DIAGNOSIS — I10 ESSENTIAL (PRIMARY) HYPERTENSION: ICD-10-CM

## 2024-10-01 DIAGNOSIS — S46.011A TRAUMATIC TEAR OF RIGHT ROTATOR CUFF, UNSPECIFIED TEAR EXTENT, INITIAL ENCOUNTER: ICD-10-CM

## 2024-10-01 DIAGNOSIS — D50.0 IRON DEFICIENCY ANEMIA DUE TO CHRONIC BLOOD LOSS: ICD-10-CM

## 2024-10-01 DIAGNOSIS — E78.00 PURE HYPERCHOLESTEROLEMIA, UNSPECIFIED: ICD-10-CM

## 2024-10-01 DIAGNOSIS — E55.9 VITAMIN D DEFICIENCY: ICD-10-CM

## 2024-10-01 PROCEDURE — G8399 PT W/DXA RESULTS DOCUMENT: HCPCS | Performed by: NURSE PRACTITIONER

## 2024-10-01 PROCEDURE — 99214 OFFICE O/P EST MOD 30 MIN: CPT | Performed by: NURSE PRACTITIONER

## 2024-10-01 PROCEDURE — 3078F DIAST BP <80 MM HG: CPT | Performed by: NURSE PRACTITIONER

## 2024-10-01 PROCEDURE — G8417 CALC BMI ABV UP PARAM F/U: HCPCS | Performed by: NURSE PRACTITIONER

## 2024-10-01 PROCEDURE — 3017F COLORECTAL CA SCREEN DOC REV: CPT | Performed by: NURSE PRACTITIONER

## 2024-10-01 PROCEDURE — 3074F SYST BP LT 130 MM HG: CPT | Performed by: NURSE PRACTITIONER

## 2024-10-01 PROCEDURE — 1090F PRES/ABSN URINE INCON ASSESS: CPT | Performed by: NURSE PRACTITIONER

## 2024-10-01 PROCEDURE — G8427 DOCREV CUR MEDS BY ELIG CLIN: HCPCS | Performed by: NURSE PRACTITIONER

## 2024-10-01 PROCEDURE — 1123F ACP DISCUSS/DSCN MKR DOCD: CPT | Performed by: NURSE PRACTITIONER

## 2024-10-01 PROCEDURE — G0439 PPPS, SUBSEQ VISIT: HCPCS | Performed by: NURSE PRACTITIONER

## 2024-10-01 PROCEDURE — 1036F TOBACCO NON-USER: CPT | Performed by: NURSE PRACTITIONER

## 2024-10-01 PROCEDURE — G8484 FLU IMMUNIZE NO ADMIN: HCPCS | Performed by: NURSE PRACTITIONER

## 2024-10-01 SDOH — ECONOMIC STABILITY: FOOD INSECURITY: WITHIN THE PAST 12 MONTHS, YOU WORRIED THAT YOUR FOOD WOULD RUN OUT BEFORE YOU GOT MONEY TO BUY MORE.: NEVER TRUE

## 2024-10-01 SDOH — ECONOMIC STABILITY: FOOD INSECURITY: WITHIN THE PAST 12 MONTHS, THE FOOD YOU BOUGHT JUST DIDN'T LAST AND YOU DIDN'T HAVE MONEY TO GET MORE.: NEVER TRUE

## 2024-10-01 SDOH — ECONOMIC STABILITY: INCOME INSECURITY: HOW HARD IS IT FOR YOU TO PAY FOR THE VERY BASICS LIKE FOOD, HOUSING, MEDICAL CARE, AND HEATING?: NOT HARD AT ALL

## 2024-10-01 ASSESSMENT — PATIENT HEALTH QUESTIONNAIRE - PHQ9
SUM OF ALL RESPONSES TO PHQ9 QUESTIONS 1 & 2: 0
SUM OF ALL RESPONSES TO PHQ QUESTIONS 1-9: 0
1. LITTLE INTEREST OR PLEASURE IN DOING THINGS: NOT AT ALL
2. FEELING DOWN, DEPRESSED OR HOPELESS: NOT AT ALL

## 2024-10-01 ASSESSMENT — LIFESTYLE VARIABLES
HOW OFTEN DO YOU HAVE A DRINK CONTAINING ALCOHOL: MONTHLY OR LESS
HOW MANY STANDARD DRINKS CONTAINING ALCOHOL DO YOU HAVE ON A TYPICAL DAY: 1 OR 2

## 2024-10-01 NOTE — PROGRESS NOTES
Chief Complaint   Patient presents with    Medicare AWV    Lab Collection     Patient presents in the office today for a AWV and labs.  Patient stated that she would like her labs from 2023  to be drawn to done.  Patient stated that [place] the ocean in last month stated that her shoulder has been hurting her every since. Stated she can't move her shoulder a certain way.  No other concerns.    \"Have you been to the ER, urgent care clinic since your last visit?  Hospitalized since your last visit?\"    NO    “Have you seen or consulted any other health care providers outside our system since your last visit?”    NO            
Preethi Baltazar APRN - NP   pantoprazole (PROTONIX) 40 MG tablet Take 1 tablet by mouth daily Yes Preethi Baltazar APRN - NP   PARoxetine (PAXIL) 20 MG tablet Take 1 tablet by mouth daily Yes Preethi Baltazar APRN - NP   rosuvastatin (CRESTOR) 10 MG tablet Take 1 tablet by mouth nightly Yes Preethi Baltazar APRN - NP   triamterene-hydroCHLOROthiazide (MAXZIDE-25) 37.5-25 MG per tablet Take 1 tablet by mouth daily Yes Preethi Baltazar APRN - NP   benralizumab (FASENRA) 30 MG/ML SOSY injection Inject 1 mL into the skin Every 8 weeks Every other month Yes Preethi Baltazar APRN - NP   SYMBICORT 160-4.5 MCG/ACT AERO Inhale 2 puffs into the lungs daily Yes Provider, Historical, MD       CareTeam (Including outside providers/suppliers regularly involved in providing care):   Patient Care Team:  Preethi Baltazar APRN - NP as PCP - General (Family Medicine)  Preethi Baltazar APRN - NP as PCP - Empaneled Provider  Jailyn Monroe MD as Physician      Reviewed and updated this visit:  Tobacco  Allergies  Meds  Med Hx  Surg Hx  Soc Hx  Fam Hx       I have discussed the diagnosis with the patient and the intended plan as seen in the above orders. The patient has received an after-visit summary and questions were answered concerning future plans. Patient conveyed understanding of the plan at the time of the visit.    Preethi Baltazar, MSN, ANP  10/1/2024

## 2024-10-01 NOTE — PATIENT INSTRUCTIONS
screen for glaucoma; cataracts, macular degeneration, and other eye disorders.  A preventive dental visit is recommended every 6 months.  Try to get at least 150 minutes of exercise per week or 10,000 steps per day on a pedometer .  Order or download the FREE \"Exercise & Physical Activity: Your Everyday Guide\" from [employer] [place] [employer]. Call 1-206.429.3444 or search [employer] [place] [employer] online.  You need 0735-7367 mg of calcium and 3273-3651 IU of vitamin D per day. It is possible to meet your calcium requirement with diet alone, but a vitamin D supplement is usually necessary to meet this goal.  When exposed to the sun, use a sunscreen that protects against both UVA and UVB radiation with an SPF of 30 or greater. Reapply every 2 to 3 hours or after sweating, drying off with a towel, or swimming.  Always wear a seat belt when traveling in a car. Always wear a helmet when riding a bicycle or motorcycle.

## 2024-10-02 LAB
25(OH)D3 SERPL-MCNC: 35 NG/ML (ref 30–100)
ALBUMIN SERPL-MCNC: 3.9 G/DL (ref 3.5–5)
ALBUMIN/GLOB SERPL: 1.2 (ref 1.1–2.2)
ALP SERPL-CCNC: 73 U/L (ref 45–117)
ALT SERPL-CCNC: 18 U/L (ref 12–78)
ANION GAP SERPL CALC-SCNC: 6 MMOL/L (ref 2–12)
AST SERPL-CCNC: 23 U/L (ref 15–37)
BASOPHILS # BLD: 0 K/UL (ref 0–0.1)
BASOPHILS NFR BLD: 0 % (ref 0–1)
BILIRUB SERPL-MCNC: 0.5 MG/DL (ref 0.2–1)
BUN SERPL-MCNC: 11 MG/DL (ref 6–20)
BUN/CREAT SERPL: 10 (ref 12–20)
CALCIUM SERPL-MCNC: 9.2 MG/DL (ref 8.5–10.1)
CHLORIDE SERPL-SCNC: 103 MMOL/L (ref 97–108)
CHOLEST SERPL-MCNC: 125 MG/DL
CO2 SERPL-SCNC: 31 MMOL/L (ref 21–32)
CREAT SERPL-MCNC: 1.11 MG/DL (ref 0.55–1.02)
DIFFERENTIAL METHOD BLD: ABNORMAL
EOSINOPHIL # BLD: 0 K/UL (ref 0–0.4)
EOSINOPHIL NFR BLD: 0 % (ref 0–7)
ERYTHROCYTE [DISTWIDTH] IN BLOOD BY AUTOMATED COUNT: 15.6 % (ref 11.5–14.5)
EST. AVERAGE GLUCOSE BLD GHB EST-MCNC: 108 MG/DL
FERRITIN SERPL-MCNC: 7 NG/ML (ref 26–388)
GLOBULIN SER CALC-MCNC: 3.3 G/DL (ref 2–4)
GLUCOSE SERPL-MCNC: 108 MG/DL (ref 65–100)
HBA1C MFR BLD: 5.4 % (ref 4–5.6)
HCT VFR BLD AUTO: 40.2 % (ref 35–47)
HDLC SERPL-MCNC: 30 MG/DL
HDLC SERPL: 4.2 (ref 0–5)
HGB BLD-MCNC: 12.4 G/DL (ref 11.5–16)
IMM GRANULOCYTES # BLD AUTO: 0 K/UL (ref 0–0.04)
IMM GRANULOCYTES NFR BLD AUTO: 1 % (ref 0–0.5)
IRON SATN MFR SERPL: 10 % (ref 20–50)
IRON SERPL-MCNC: 41 UG/DL (ref 35–150)
LDLC SERPL CALC-MCNC: 50.8 MG/DL (ref 0–100)
LYMPHOCYTES # BLD: 1.1 K/UL (ref 0.8–3.5)
LYMPHOCYTES NFR BLD: 14 % (ref 12–49)
MCH RBC QN AUTO: 24.2 PG (ref 26–34)
MCHC RBC AUTO-ENTMCNC: 30.8 G/DL (ref 30–36.5)
MCV RBC AUTO: 78.4 FL (ref 80–99)
MONOCYTES # BLD: 0.7 K/UL (ref 0–1)
MONOCYTES NFR BLD: 8 % (ref 5–13)
NEUTS SEG # BLD: 6.3 K/UL (ref 1.8–8)
NEUTS SEG NFR BLD: 77 % (ref 32–75)
NRBC # BLD: 0 K/UL (ref 0–0.01)
NRBC BLD-RTO: 0 PER 100 WBC
PLATELET # BLD AUTO: 232 K/UL (ref 150–400)
PMV BLD AUTO: 10.3 FL (ref 8.9–12.9)
POTASSIUM SERPL-SCNC: 3.4 MMOL/L (ref 3.5–5.1)
PROT SERPL-MCNC: 7.2 G/DL (ref 6.4–8.2)
RBC # BLD AUTO: 5.13 M/UL (ref 3.8–5.2)
SODIUM SERPL-SCNC: 140 MMOL/L (ref 136–145)
TIBC SERPL-MCNC: 409 UG/DL (ref 250–450)
TRIGL SERPL-MCNC: 221 MG/DL
VLDLC SERPL CALC-MCNC: 44.2 MG/DL
WBC # BLD AUTO: 8.2 K/UL (ref 3.6–11)

## 2024-10-10 NOTE — PROGRESS NOTES
Cancer [place] at [facility]  [address], [address] [place] [address]  W: [phone]  F: [phone]      Reason for Visit:   Leonor Soriano is a 74 y.o. female who is seen today for evaluation of iron deficiency anemia.    History of Present Illness:   She reports worsening fatigue for the past 1-2 months, getting progressively worse in the past few weeks. Some dyspnea and dizziness. No pica. No bleeding, no melena.      Review of systems was obtained and pertinent findings reviewed above. Past medical history, social history, family history, medications, and allergies are located in the electronic medical record.    Physical Exam:   There were no vitals taken for this visit.  General: alert, cooperative, no distress   Mental  status: normal mood, behavior, speech, dress, motor activity, and thought processes, able to follow commands   HENT: NCAT   Neck: no visualized mass   Resp: no respiratory distress   Neuro: no gross deficits   Skin: no discoloration or lesions of concern on visible areas   Psychiatric: normal affect, consistent with stated mood, no evidence of hallucinations     Due to this being a TeleHealth evaluation, many elements of the physical examination are unable to be assessed.  Evaluation of the following organ systems was limited: Vitals/Constitutional/EENT/Resp/CV/GI//MS/Neuro/Skin/Heme-Lymph-Imm.      Results:     Lab Results   Component Value Date    WBC 8.2 10/01/2024    HGB 12.4 10/01/2024    HCT 40.2 10/01/2024     10/01/2024    MCV 78.4 (L) 10/01/2024    NEUTROABS 6.3 10/01/2024     Lab Results   Component Value Date     10/01/2024    K 3.4 (L) 10/01/2024     10/01/2024    CO2 31 10/01/2024    GLUCOSE 108 (H) 10/01/2024    BUN 11 10/01/2024    CREATININE 1.11 (H) 10/01/2024    LABGLOM 53 (L) 10/01/2024    CALCIUM 9.2 10/01/2024    MG 2.1 04/18/2023     Lab Results   Component Value Date    BILITOT 0.5 10/01/2024    ALT 18

## 2024-10-18 ENCOUNTER — TELEMEDICINE (OUTPATIENT)
Age: 74
End: 2024-10-18
Payer: MEDICARE

## 2024-10-18 DIAGNOSIS — D50.0 IRON DEFICIENCY ANEMIA SECONDARY TO BLOOD LOSS (CHRONIC): Primary | ICD-10-CM

## 2024-10-18 PROCEDURE — 99214 OFFICE O/P EST MOD 30 MIN: CPT | Performed by: INTERNAL MEDICINE

## 2024-10-18 PROCEDURE — 1123F ACP DISCUSS/DSCN MKR DOCD: CPT | Performed by: INTERNAL MEDICINE

## 2024-10-18 PROCEDURE — G8427 DOCREV CUR MEDS BY ELIG CLIN: HCPCS | Performed by: INTERNAL MEDICINE

## 2024-10-18 PROCEDURE — G8484 FLU IMMUNIZE NO ADMIN: HCPCS | Performed by: INTERNAL MEDICINE

## 2024-10-18 PROCEDURE — 1036F TOBACCO NON-USER: CPT | Performed by: INTERNAL MEDICINE

## 2024-10-18 PROCEDURE — G8399 PT W/DXA RESULTS DOCUMENT: HCPCS | Performed by: INTERNAL MEDICINE

## 2024-10-18 PROCEDURE — 1090F PRES/ABSN URINE INCON ASSESS: CPT | Performed by: INTERNAL MEDICINE

## 2024-10-18 PROCEDURE — 3017F COLORECTAL CA SCREEN DOC REV: CPT | Performed by: INTERNAL MEDICINE

## 2024-10-18 PROCEDURE — G8417 CALC BMI ABV UP PARAM F/U: HCPCS | Performed by: INTERNAL MEDICINE

## 2024-10-18 NOTE — PATIENT INSTRUCTIONS
Thank you for participating in the virtual visit with Dr. Pedraza.    We will call you soon to schedule a follow up appointment with us in 6 months.      If you do not hear from us, please call us at [phone] to schedule your appointment.    Please use the enclosed lab slip to have your labs done before your next appointment.

## 2024-10-18 NOTE — PROGRESS NOTES
Leonor Soriano is a 74 y.o. female follow up for anemia.      1. Have you been to the ER, urgent care clinic since your last visit?  Hospitalized since your last visit?no    2. Have you seen or consulted any other health care providers outside of the [facility] since your last visit?  Include any pap smears or colon screening. no

## 2024-10-21 RX ORDER — SODIUM CHLORIDE 0.9 % (FLUSH) 0.9 %
5-40 SYRINGE (ML) INJECTION PRN
Status: CANCELLED | OUTPATIENT
Start: 2024-10-25

## 2024-10-21 RX ORDER — EPINEPHRINE 1 MG/ML
0.3 INJECTION, SOLUTION, CONCENTRATE INTRAVENOUS PRN
Status: CANCELLED | OUTPATIENT
Start: 2024-10-25

## 2024-10-21 RX ORDER — SODIUM CHLORIDE 9 MG/ML
5-250 INJECTION, SOLUTION INTRAVENOUS PRN
Status: CANCELLED | OUTPATIENT
Start: 2024-10-25

## 2024-10-21 RX ORDER — DIPHENHYDRAMINE HYDROCHLORIDE 50 MG/ML
50 INJECTION INTRAMUSCULAR; INTRAVENOUS
Status: CANCELLED | OUTPATIENT
Start: 2024-10-25

## 2024-10-21 RX ORDER — HEPARIN SODIUM (PORCINE) LOCK FLUSH IV SOLN 100 UNIT/ML 100 UNIT/ML
500 SOLUTION INTRAVENOUS PRN
Status: CANCELLED | OUTPATIENT
Start: 2024-10-25

## 2024-10-21 RX ORDER — ACETAMINOPHEN 325 MG/1
650 TABLET ORAL
Status: CANCELLED | OUTPATIENT
Start: 2024-10-25

## 2024-10-21 RX ORDER — ALBUTEROL SULFATE 90 UG/1
4 INHALANT RESPIRATORY (INHALATION) PRN
Status: CANCELLED | OUTPATIENT
Start: 2024-10-25

## 2024-10-21 RX ORDER — ONDANSETRON 2 MG/ML
8 INJECTION INTRAMUSCULAR; INTRAVENOUS
Status: CANCELLED | OUTPATIENT
Start: 2024-10-25

## 2024-10-21 RX ORDER — SODIUM CHLORIDE 9 MG/ML
INJECTION, SOLUTION INTRAVENOUS CONTINUOUS
Status: CANCELLED | OUTPATIENT
Start: 2024-10-25

## 2024-10-21 RX ORDER — FAMOTIDINE 10 MG/ML
20 INJECTION, SOLUTION INTRAVENOUS
Status: CANCELLED | OUTPATIENT
Start: 2024-10-25

## 2024-10-22 ENCOUNTER — TELEPHONE (OUTPATIENT)
Age: 74
End: 2024-10-22

## 2024-10-22 RX ORDER — SODIUM CHLORIDE 9 MG/ML
5-250 INJECTION, SOLUTION INTRAVENOUS PRN
OUTPATIENT
Start: 2024-10-25

## 2024-10-22 RX ORDER — SODIUM CHLORIDE 9 MG/ML
INJECTION, SOLUTION INTRAVENOUS CONTINUOUS
OUTPATIENT
Start: 2024-10-25

## 2024-10-22 RX ORDER — EPINEPHRINE 1 MG/ML
0.3 INJECTION, SOLUTION, CONCENTRATE INTRAVENOUS PRN
OUTPATIENT
Start: 2024-10-25

## 2024-10-22 RX ORDER — DIPHENHYDRAMINE HYDROCHLORIDE 50 MG/ML
50 INJECTION INTRAMUSCULAR; INTRAVENOUS
OUTPATIENT
Start: 2024-10-25

## 2024-10-22 RX ORDER — ALBUTEROL SULFATE 90 UG/1
4 INHALANT RESPIRATORY (INHALATION) PRN
OUTPATIENT
Start: 2024-10-25

## 2024-10-22 RX ORDER — HEPARIN 100 UNIT/ML
500 SYRINGE INTRAVENOUS PRN
OUTPATIENT
Start: 2024-10-25

## 2024-10-22 RX ORDER — ACETAMINOPHEN 325 MG/1
650 TABLET ORAL
OUTPATIENT
Start: 2024-10-25

## 2024-10-22 RX ORDER — ONDANSETRON 2 MG/ML
8 INJECTION INTRAMUSCULAR; INTRAVENOUS
OUTPATIENT
Start: 2024-10-25

## 2024-10-22 RX ORDER — SODIUM CHLORIDE 0.9 % (FLUSH) 0.9 %
5-40 SYRINGE (ML) INJECTION PRN
OUTPATIENT
Start: 2024-10-25

## 2024-10-22 NOTE — TELEPHONE ENCOUNTER
Spoke with patient to let her know her insurance prefers venofer, so we switched her iron infusions to venofer x 5 and adjusted her schedule, she had no further questions.

## 2024-10-28 ENCOUNTER — HOSPITAL ENCOUNTER (OUTPATIENT)
Facility: HOSPITAL | Age: 74
Setting detail: INFUSION SERIES
Discharge: HOME | End: 2024-10-28
Payer: MEDICARE

## 2024-10-28 VITALS
HEART RATE: 57 BPM | DIASTOLIC BLOOD PRESSURE: 67 MMHG | TEMPERATURE: 97.6 F | WEIGHT: 212.1 LBS | SYSTOLIC BLOOD PRESSURE: 119 MMHG | HEIGHT: 64 IN | RESPIRATION RATE: 18 BRPM | BODY MASS INDEX: 36.21 KG/M2 | OXYGEN SATURATION: 95 %

## 2024-10-28 DIAGNOSIS — K55.21 AVM (ARTERIOVENOUS MALFORMATION) OF COLON WITH HEMORRHAGE: Primary | ICD-10-CM

## 2024-10-28 DIAGNOSIS — D50.0 IRON DEFICIENCY ANEMIA DUE TO CHRONIC BLOOD LOSS: ICD-10-CM

## 2024-10-28 PROCEDURE — 96374 THER/PROPH/DIAG INJ IV PUSH: CPT

## 2024-10-28 PROCEDURE — 6360000002 HC RX W HCPCS: Performed by: NURSE PRACTITIONER

## 2024-10-28 RX ORDER — DIPHENHYDRAMINE HYDROCHLORIDE 50 MG/ML
50 INJECTION INTRAMUSCULAR; INTRAVENOUS
Status: CANCELLED | OUTPATIENT
Start: 2024-11-04

## 2024-10-28 RX ORDER — SODIUM CHLORIDE 9 MG/ML
5-250 INJECTION, SOLUTION INTRAVENOUS PRN
Status: CANCELLED | OUTPATIENT
Start: 2024-11-04

## 2024-10-28 RX ORDER — ACETAMINOPHEN 325 MG/1
650 TABLET ORAL
Status: CANCELLED | OUTPATIENT
Start: 2024-11-04

## 2024-10-28 RX ORDER — SODIUM CHLORIDE 9 MG/ML
INJECTION, SOLUTION INTRAVENOUS CONTINUOUS
Status: CANCELLED | OUTPATIENT
Start: 2024-11-04

## 2024-10-28 RX ORDER — HEPARIN 100 UNIT/ML
500 SYRINGE INTRAVENOUS PRN
Status: CANCELLED | OUTPATIENT
Start: 2024-11-04

## 2024-10-28 RX ORDER — ONDANSETRON 2 MG/ML
8 INJECTION INTRAMUSCULAR; INTRAVENOUS
Status: CANCELLED | OUTPATIENT
Start: 2024-11-04

## 2024-10-28 RX ORDER — SODIUM CHLORIDE 0.9 % (FLUSH) 0.9 %
5-40 SYRINGE (ML) INJECTION PRN
Status: CANCELLED | OUTPATIENT
Start: 2024-11-04

## 2024-10-28 RX ORDER — EPINEPHRINE 1 MG/ML
0.3 INJECTION, SOLUTION INTRAMUSCULAR; SUBCUTANEOUS PRN
Status: CANCELLED | OUTPATIENT
Start: 2024-11-04

## 2024-10-28 RX ORDER — FAMOTIDINE 10 MG/ML
20 INJECTION, SOLUTION INTRAVENOUS
Status: CANCELLED | OUTPATIENT
Start: 2024-11-04

## 2024-10-28 RX ORDER — ALBUTEROL SULFATE 90 UG/1
4 INHALANT RESPIRATORY (INHALATION) PRN
Status: CANCELLED | OUTPATIENT
Start: 2024-11-04

## 2024-10-28 RX ADMIN — IRON SUCROSE 200 MG: 20 INJECTION, SOLUTION INTRAVENOUS at 16:00

## 2024-10-28 ASSESSMENT — PAIN SCALES - GENERAL: PAINLEVEL_OUTOF10: 0

## 2024-10-28 NOTE — PROGRESS NOTES
[facility] Progress Note    Date: October 28, 2024    1530: Ms. Soriano Arrived ambulatory and in stable condition to the [facility] for  Venofer Push.  Assessment was completed, no new complaints voiced. 24G PIV placed to right arm with positive blood return. Criteria for treatment was met.    Ms. Soriano's vitals were reviewed.  Patient Vitals for the past 12 hrs:   Temp Pulse Resp BP SpO2   10/28/24 1632 -- 57 -- 119/67 --   10/28/24 1535 97.6 °F (36.4 °C) 58 18 132/77 95 %     Medications:  Medications Administered         iron sucrose (VENOFER) injection 200 mg Admin Date  10/28/2024 Action  Given Dose  200 mg Route  IntraVENous Documented By  Mercy Arciniega, MARIO           Patient tolerated treatment well.  PIV maintained blood return throughout treatment. PIV removed and 2x2 with coban placed. Patient was discharged in stable condition. Patient is aware of next scheduled [facility] appointment on November 4, 2024 at 1530.    Future Appointments   Date Time Provider Department Center   11/4/2024  3:30 PM [facility]   11/11/2024  3:30 PM [facility]   11/18/2024  3:30 PM [facility]   11/25/2024  3:30 PM [facility]   4/25/2025 11:50 AM Agapito Pedraza MD [facility] BS AMB     Mercy Arciniega RN  October 28, 2024

## 2024-11-04 ENCOUNTER — HOSPITAL ENCOUNTER (OUTPATIENT)
Facility: HOSPITAL | Age: 74
Setting detail: INFUSION SERIES
Discharge: HOME | End: 2024-11-04
Payer: MEDICARE

## 2024-11-04 VITALS
HEIGHT: 64 IN | BODY MASS INDEX: 35.78 KG/M2 | SYSTOLIC BLOOD PRESSURE: 122 MMHG | DIASTOLIC BLOOD PRESSURE: 74 MMHG | HEART RATE: 64 BPM | RESPIRATION RATE: 18 BRPM | WEIGHT: 209.6 LBS | OXYGEN SATURATION: 95 % | TEMPERATURE: 97 F

## 2024-11-04 DIAGNOSIS — K55.21 AVM (ARTERIOVENOUS MALFORMATION) OF COLON WITH HEMORRHAGE: Primary | ICD-10-CM

## 2024-11-04 DIAGNOSIS — D50.0 IRON DEFICIENCY ANEMIA DUE TO CHRONIC BLOOD LOSS: ICD-10-CM

## 2024-11-04 PROCEDURE — 6360000002 HC RX W HCPCS: Performed by: NURSE PRACTITIONER

## 2024-11-04 PROCEDURE — 2580000003 HC RX 258: Performed by: NURSE PRACTITIONER

## 2024-11-04 PROCEDURE — 96374 THER/PROPH/DIAG INJ IV PUSH: CPT

## 2024-11-04 RX ORDER — HEPARIN 100 UNIT/ML
500 SYRINGE INTRAVENOUS PRN
Status: CANCELLED | OUTPATIENT
Start: 2024-11-11

## 2024-11-04 RX ORDER — SODIUM CHLORIDE 9 MG/ML
INJECTION, SOLUTION INTRAVENOUS CONTINUOUS
Status: CANCELLED | OUTPATIENT
Start: 2024-11-11

## 2024-11-04 RX ORDER — SODIUM CHLORIDE 9 MG/ML
5-250 INJECTION, SOLUTION INTRAVENOUS PRN
Status: CANCELLED | OUTPATIENT
Start: 2024-11-11

## 2024-11-04 RX ORDER — PREDNISONE 10 MG/1
10 TABLET ORAL
COMMUNITY

## 2024-11-04 RX ORDER — ONDANSETRON 2 MG/ML
8 INJECTION INTRAMUSCULAR; INTRAVENOUS
Status: CANCELLED | OUTPATIENT
Start: 2024-11-11

## 2024-11-04 RX ORDER — DOXYCYCLINE HYCLATE 100 MG
1 TABLET ORAL 2 TIMES DAILY
COMMUNITY

## 2024-11-04 RX ORDER — DIPHENHYDRAMINE HYDROCHLORIDE 50 MG/ML
50 INJECTION INTRAMUSCULAR; INTRAVENOUS
Status: CANCELLED | OUTPATIENT
Start: 2024-11-11

## 2024-11-04 RX ORDER — ACETAMINOPHEN 325 MG/1
650 TABLET ORAL
Status: CANCELLED | OUTPATIENT
Start: 2024-11-11

## 2024-11-04 RX ORDER — EPINEPHRINE 1 MG/ML
0.3 INJECTION, SOLUTION INTRAMUSCULAR; SUBCUTANEOUS PRN
Status: CANCELLED | OUTPATIENT
Start: 2024-11-11

## 2024-11-04 RX ORDER — SODIUM CHLORIDE 0.9 % (FLUSH) 0.9 %
5-40 SYRINGE (ML) INJECTION PRN
Status: CANCELLED | OUTPATIENT
Start: 2024-11-11

## 2024-11-04 RX ORDER — SODIUM CHLORIDE 0.9 % (FLUSH) 0.9 %
5-40 SYRINGE (ML) INJECTION PRN
Status: DISCONTINUED | OUTPATIENT
Start: 2024-11-04 | End: 2024-11-05 | Stop reason: HOSPADM

## 2024-11-04 RX ORDER — ALBUTEROL SULFATE 90 UG/1
4 INHALANT RESPIRATORY (INHALATION) PRN
Status: CANCELLED | OUTPATIENT
Start: 2024-11-11

## 2024-11-04 RX ORDER — FAMOTIDINE 10 MG/ML
20 INJECTION, SOLUTION INTRAVENOUS
Status: CANCELLED | OUTPATIENT
Start: 2024-11-11

## 2024-11-04 RX ADMIN — IRON SUCROSE 200 MG: 20 INJECTION, SOLUTION INTRAVENOUS at 16:09

## 2024-11-04 RX ADMIN — SODIUM CHLORIDE, PRESERVATIVE FREE 10 ML: 5 INJECTION INTRAVENOUS at 16:22

## 2024-11-04 ASSESSMENT — PAIN SCALES - GENERAL: PAINLEVEL_OUTOF10: 0

## 2024-11-04 NOTE — PROGRESS NOTES
[facility] Progress Note        Date: 2024    Name: Leonor Soriano    MRN: [id number]         : 1950    Ms. Soriano Arrived ambulatory and in no distress for Venofer 2/5 Regimen.  Assessment was completed, no acute issues at this time, pt c/o taking abx and prednisone for sinus infection started last Saturday. Notified to MD office. Ok to treat per Dr. Pedraza.   RW 24G PIV placed with +blood return by edil mckoyussed after 3rd attempts. Criteria are met for today treatment.      Ms. Soriano's vitals were reviewed.  Patient Vitals for the past 12 hrs:   Temp Pulse Resp BP SpO2   24 1527 97 °F (36.1 °C) 64 18 122/74 95 %           Medications received:  Medications Administered         iron sucrose (VENOFER) injection 200 mg Admin Date  2024 Action  Given Dose  200 mg Route  IntraVENous Documented By  Melissa Martins, MARIO        sodium chloride flush 0.9 % injection 5-40 mL Admin Date  2024 Action  Given Dose  10 mL Route  IntraVENous Documented By  Melissa Martins, RN             Ms. Soriano tolerated treatment well and was discharged from [facility] in stable condition at 1625. PIV flushed and removed per protocol. She is to return on  2024 at 1530 for her next appointment.    Melissa Martins RN  2024    Future Appointments:  Future Appointments   Date Time Provider Department Center   2024  3:30 PM SS FASTTRACK 3 [facility]   2024  3:30 PM SS FASTTRACK 3 [facility]   2024  3:30 PM SS FASTTRACK 3 [facility]   2025 11:50 AM Agapito Pedraza MD [facility] BS AMB

## 2024-11-11 ENCOUNTER — HOSPITAL ENCOUNTER (OUTPATIENT)
Facility: HOSPITAL | Age: 74
Setting detail: INFUSION SERIES
Discharge: HOME | End: 2024-11-11
Payer: MEDICARE

## 2024-11-11 VITALS
RESPIRATION RATE: 18 BRPM | SYSTOLIC BLOOD PRESSURE: 127 MMHG | OXYGEN SATURATION: 94 % | DIASTOLIC BLOOD PRESSURE: 70 MMHG | HEART RATE: 57 BPM | TEMPERATURE: 97.6 F

## 2024-11-11 DIAGNOSIS — D50.0 IRON DEFICIENCY ANEMIA DUE TO CHRONIC BLOOD LOSS: ICD-10-CM

## 2024-11-11 DIAGNOSIS — K55.21 AVM (ARTERIOVENOUS MALFORMATION) OF COLON WITH HEMORRHAGE: Primary | ICD-10-CM

## 2024-11-11 PROCEDURE — 96374 THER/PROPH/DIAG INJ IV PUSH: CPT

## 2024-11-11 PROCEDURE — 6360000002 HC RX W HCPCS: Performed by: NURSE PRACTITIONER

## 2024-11-11 RX ORDER — ONDANSETRON 2 MG/ML
8 INJECTION INTRAMUSCULAR; INTRAVENOUS
Status: CANCELLED | OUTPATIENT
Start: 2024-11-18

## 2024-11-11 RX ORDER — DIPHENHYDRAMINE HYDROCHLORIDE 50 MG/ML
50 INJECTION INTRAMUSCULAR; INTRAVENOUS
Status: CANCELLED | OUTPATIENT
Start: 2024-11-18

## 2024-11-11 RX ORDER — ACETAMINOPHEN 325 MG/1
650 TABLET ORAL
Status: CANCELLED | OUTPATIENT
Start: 2024-11-18

## 2024-11-11 RX ORDER — FAMOTIDINE 10 MG/ML
20 INJECTION, SOLUTION INTRAVENOUS
Status: CANCELLED | OUTPATIENT
Start: 2024-11-18

## 2024-11-11 RX ORDER — SODIUM CHLORIDE 9 MG/ML
5-250 INJECTION, SOLUTION INTRAVENOUS PRN
Status: CANCELLED | OUTPATIENT
Start: 2024-11-18

## 2024-11-11 RX ORDER — SODIUM CHLORIDE 9 MG/ML
INJECTION, SOLUTION INTRAVENOUS CONTINUOUS
Status: CANCELLED | OUTPATIENT
Start: 2024-11-18

## 2024-11-11 RX ORDER — HYDROCORTISONE SODIUM SUCCINATE 100 MG/2ML
100 INJECTION INTRAMUSCULAR; INTRAVENOUS
Status: CANCELLED | OUTPATIENT
Start: 2024-11-18

## 2024-11-11 RX ORDER — ALBUTEROL SULFATE 90 UG/1
4 INHALANT RESPIRATORY (INHALATION) PRN
Status: CANCELLED | OUTPATIENT
Start: 2024-11-18

## 2024-11-11 RX ORDER — SODIUM CHLORIDE 9 MG/ML
5-250 INJECTION, SOLUTION INTRAVENOUS PRN
Status: DISCONTINUED | OUTPATIENT
Start: 2024-11-11 | End: 2024-11-12 | Stop reason: HOSPADM

## 2024-11-11 RX ORDER — HEPARIN 100 UNIT/ML
500 SYRINGE INTRAVENOUS PRN
Status: CANCELLED | OUTPATIENT
Start: 2024-11-18

## 2024-11-11 RX ORDER — SODIUM CHLORIDE 0.9 % (FLUSH) 0.9 %
5-40 SYRINGE (ML) INJECTION PRN
Status: CANCELLED | OUTPATIENT
Start: 2024-11-18

## 2024-11-11 RX ORDER — EPINEPHRINE 1 MG/ML
0.3 INJECTION, SOLUTION INTRAMUSCULAR; SUBCUTANEOUS PRN
Status: CANCELLED | OUTPATIENT
Start: 2024-11-18

## 2024-11-11 RX ADMIN — IRON SUCROSE 200 MG: 20 INJECTION, SOLUTION INTRAVENOUS at 15:56

## 2024-11-11 ASSESSMENT — PAIN SCALES - GENERAL: PAINLEVEL_OUTOF10: 0

## 2024-11-11 NOTE — PROGRESS NOTES
Outpatient [facility]fusion Center Short Visit Progress Note    Ms. Soriano admitted to [facility] for Venofer ambulatory in stable condition. Assessment completed. No new concerns voiced.     Vital Signs:  /70   Pulse 57   Temp 97.6 °F (36.4 °C) (Temporal)   Resp 18   SpO2 94%       24G PIV placed with positive blood return.         Medications:  Medications Administered         iron sucrose (VENOFER) injection 200 mg Admin Date  11/11/2024 Action  Given Dose  200 mg Route  IntraVENous Documented By  Armani Lozada, MARIO                  Patient tolerated treatment well. PIV removed. Patient discharged from [facility] ambulatory in no distress. Patient aware of next appointment.    Armani Lozada RN  November 11, 2024    Future Appointments   Date Time Provider Department Center   11/18/2024  3:30 PM SS FASTTRACK 3 [facility]   11/25/2024  3:30 PM SS FASTTRACK 3 [facility]   4/25/2025 11:50 AM Agapito Pedraza MD [facility] BS AMB

## 2024-11-18 ENCOUNTER — HOSPITAL ENCOUNTER (OUTPATIENT)
Facility: HOSPITAL | Age: 74
Setting detail: INFUSION SERIES
Discharge: HOME | End: 2024-11-18
Payer: MEDICARE

## 2024-11-18 VITALS
SYSTOLIC BLOOD PRESSURE: 115 MMHG | RESPIRATION RATE: 18 BRPM | DIASTOLIC BLOOD PRESSURE: 83 MMHG | TEMPERATURE: 97.4 F | HEART RATE: 74 BPM | OXYGEN SATURATION: 92 %

## 2024-11-18 DIAGNOSIS — K55.21 AVM (ARTERIOVENOUS MALFORMATION) OF COLON WITH HEMORRHAGE: Primary | ICD-10-CM

## 2024-11-18 DIAGNOSIS — D50.0 IRON DEFICIENCY ANEMIA DUE TO CHRONIC BLOOD LOSS: ICD-10-CM

## 2024-11-18 PROCEDURE — 6360000002 HC RX W HCPCS: Performed by: NURSE PRACTITIONER

## 2024-11-18 PROCEDURE — 96374 THER/PROPH/DIAG INJ IV PUSH: CPT

## 2024-11-18 RX ORDER — HEPARIN 100 UNIT/ML
500 SYRINGE INTRAVENOUS PRN
OUTPATIENT
Start: 2024-11-25

## 2024-11-18 RX ORDER — SODIUM CHLORIDE 0.9 % (FLUSH) 0.9 %
5-40 SYRINGE (ML) INJECTION PRN
OUTPATIENT
Start: 2024-11-25

## 2024-11-18 RX ORDER — ALBUTEROL SULFATE 90 UG/1
4 INHALANT RESPIRATORY (INHALATION) PRN
OUTPATIENT
Start: 2024-11-25

## 2024-11-18 RX ORDER — EPINEPHRINE 1 MG/ML
0.3 INJECTION, SOLUTION INTRAMUSCULAR; SUBCUTANEOUS PRN
OUTPATIENT
Start: 2024-11-25

## 2024-11-18 RX ORDER — DIPHENHYDRAMINE HYDROCHLORIDE 50 MG/ML
50 INJECTION INTRAMUSCULAR; INTRAVENOUS
OUTPATIENT
Start: 2024-11-25

## 2024-11-18 RX ORDER — FAMOTIDINE 10 MG/ML
20 INJECTION, SOLUTION INTRAVENOUS
OUTPATIENT
Start: 2024-11-25

## 2024-11-18 RX ORDER — ACETAMINOPHEN 325 MG/1
650 TABLET ORAL
OUTPATIENT
Start: 2024-11-25

## 2024-11-18 RX ORDER — SODIUM CHLORIDE 9 MG/ML
5-250 INJECTION, SOLUTION INTRAVENOUS PRN
OUTPATIENT
Start: 2024-11-25

## 2024-11-18 RX ORDER — HYDROCORTISONE SODIUM SUCCINATE 100 MG/2ML
100 INJECTION INTRAMUSCULAR; INTRAVENOUS
OUTPATIENT
Start: 2024-11-25

## 2024-11-18 RX ORDER — ONDANSETRON 2 MG/ML
8 INJECTION INTRAMUSCULAR; INTRAVENOUS
OUTPATIENT
Start: 2024-11-25

## 2024-11-18 RX ORDER — SODIUM CHLORIDE 9 MG/ML
5-250 INJECTION, SOLUTION INTRAVENOUS PRN
Status: DISCONTINUED | OUTPATIENT
Start: 2024-11-18 | End: 2024-11-19 | Stop reason: HOSPADM

## 2024-11-18 RX ORDER — SODIUM CHLORIDE 9 MG/ML
INJECTION, SOLUTION INTRAVENOUS CONTINUOUS
OUTPATIENT
Start: 2024-11-25

## 2024-11-18 RX ADMIN — IRON SUCROSE 200 MG: 20 INJECTION, SOLUTION INTRAVENOUS at 15:45

## 2024-11-18 ASSESSMENT — PAIN SCALES - GENERAL: PAINLEVEL_OUTOF10: 0

## 2024-11-18 NOTE — PROGRESS NOTES
Outpatient Infusion Center Short Visit Progress Note    Ms. Soriano admitted to [facility] for venofer IVP 4 of 5 ambulatory in stable condition. Assessment completed. No new concerns voiced.     Vital Signs:  /83   Pulse 74   Temp 97.4 °F (36.3 °C) (Temporal)   Resp 18   SpO2 92%       R arm PIV with positive blood return.     Medications:  Medications Administered         iron sucrose (VENOFER) injection 200 mg Admin Date  11/18/2024 Action  Given Dose  200 mg Route  IntraVENous Documented By  Teresa Allred, RN            Patient tolerated treatment well. Patient discharged from [facility] ambulatory in no distress. Patient aware of next appointment.    Teresa Allred RN  November 18, 2024    Future Appointments   Date Time Provider Department Center   11/25/2024  3:30 PM SS FASTTRACK 3 [facility]   4/25/2025 11:50 AM Agapito Pedraza MD [facility] AMB

## 2024-11-25 ENCOUNTER — HOSPITAL ENCOUNTER (OUTPATIENT)
Facility: HOSPITAL | Age: 74
Setting detail: INFUSION SERIES
Discharge: HOME | End: 2024-11-25
Payer: MEDICARE

## 2024-11-25 VITALS
HEART RATE: 64 BPM | OXYGEN SATURATION: 94 % | SYSTOLIC BLOOD PRESSURE: 119 MMHG | RESPIRATION RATE: 18 BRPM | DIASTOLIC BLOOD PRESSURE: 80 MMHG | TEMPERATURE: 97.9 F

## 2024-11-25 DIAGNOSIS — K55.21 AVM (ARTERIOVENOUS MALFORMATION) OF COLON WITH HEMORRHAGE: Primary | ICD-10-CM

## 2024-11-25 DIAGNOSIS — D50.0 IRON DEFICIENCY ANEMIA DUE TO CHRONIC BLOOD LOSS: ICD-10-CM

## 2024-11-25 PROCEDURE — 6360000002 HC RX W HCPCS: Performed by: NURSE PRACTITIONER

## 2024-11-25 PROCEDURE — 96374 THER/PROPH/DIAG INJ IV PUSH: CPT

## 2024-11-25 RX ORDER — DIPHENHYDRAMINE HYDROCHLORIDE 50 MG/ML
50 INJECTION INTRAMUSCULAR; INTRAVENOUS
OUTPATIENT
Start: 2024-11-25

## 2024-11-25 RX ORDER — ACETAMINOPHEN 325 MG/1
650 TABLET ORAL
OUTPATIENT
Start: 2024-11-25

## 2024-11-25 RX ORDER — SODIUM CHLORIDE 9 MG/ML
5-250 INJECTION, SOLUTION INTRAVENOUS PRN
OUTPATIENT
Start: 2024-11-25

## 2024-11-25 RX ORDER — EPINEPHRINE 1 MG/ML
0.3 INJECTION, SOLUTION INTRAMUSCULAR; SUBCUTANEOUS PRN
OUTPATIENT
Start: 2024-11-25

## 2024-11-25 RX ORDER — SODIUM CHLORIDE 0.9 % (FLUSH) 0.9 %
5-40 SYRINGE (ML) INJECTION PRN
OUTPATIENT
Start: 2024-11-25

## 2024-11-25 RX ORDER — HEPARIN 100 UNIT/ML
500 SYRINGE INTRAVENOUS PRN
OUTPATIENT
Start: 2024-11-25

## 2024-11-25 RX ORDER — SODIUM CHLORIDE 9 MG/ML
INJECTION, SOLUTION INTRAVENOUS CONTINUOUS
OUTPATIENT
Start: 2024-11-25

## 2024-11-25 RX ORDER — ONDANSETRON 2 MG/ML
8 INJECTION INTRAMUSCULAR; INTRAVENOUS
OUTPATIENT
Start: 2024-11-25

## 2024-11-25 RX ORDER — FAMOTIDINE 10 MG/ML
20 INJECTION, SOLUTION INTRAVENOUS
OUTPATIENT
Start: 2024-11-25

## 2024-11-25 RX ORDER — ALBUTEROL SULFATE 90 UG/1
4 INHALANT RESPIRATORY (INHALATION) PRN
OUTPATIENT
Start: 2024-11-25

## 2024-11-25 RX ORDER — HYDROCORTISONE SODIUM SUCCINATE 100 MG/2ML
100 INJECTION INTRAMUSCULAR; INTRAVENOUS
OUTPATIENT
Start: 2024-11-25

## 2024-11-25 RX ORDER — SODIUM CHLORIDE 9 MG/ML
5-250 INJECTION, SOLUTION INTRAVENOUS PRN
Status: DISCONTINUED | OUTPATIENT
Start: 2024-11-25 | End: 2024-11-26 | Stop reason: HOSPADM

## 2024-11-25 RX ADMIN — IRON SUCROSE 200 MG: 20 INJECTION, SOLUTION INTRAVENOUS at 15:38

## 2024-11-25 ASSESSMENT — PAIN SCALES - GENERAL: PAINLEVEL_OUTOF10: 0

## 2024-11-25 NOTE — PROGRESS NOTES
[facility] Progress Note    Date: 2024    Name: Leonor Soriano    MRN: [id number]         : 1950    Ms. Soriano Arrived ambulatory and in no distress for 5/5 Venofer Infusion.  Assessment was completed, no acute issues at this time, no new complaints voiced.  24 G PIV established to right arm, + blood return.    Ms. Soriano's vitals were reviewed.  Vitals:    24 1515   BP: 119/80   Pulse: 64   Resp: 18   Temp: 97.9 °F (36.6 °C)   SpO2: 94%     Medications:  Medications Administered         iron sucrose (VENOFER) injection 200 mg Admin Date  2024 Action  Given Dose  200 mg Route  IntraVENous Documented By  Emelina Almendarez, RN            Ms. Soriano tolerated treatment well and was discharged from [facility] in stable condition.   PIV flushed & removed. Patient is aware of future appointments.    Future Appointments   Date Time Provider Department Center   2025 11:50 AM Agapito Pedraza MD [facility] AMB       Emelina Almendarez RN  2024

## 2024-12-11 DIAGNOSIS — J45.50 SEVERE PERSISTENT ASTHMA, UNCOMPLICATED: ICD-10-CM

## 2024-12-11 DIAGNOSIS — E78.00 PURE HYPERCHOLESTEROLEMIA, UNSPECIFIED: ICD-10-CM

## 2024-12-11 DIAGNOSIS — I10 ESSENTIAL (PRIMARY) HYPERTENSION: ICD-10-CM

## 2024-12-11 RX ORDER — PAROXETINE 20 MG/1
20 TABLET, FILM COATED ORAL DAILY
Qty: 90 TABLET | Refills: 0 | Status: SHIPPED | OUTPATIENT
Start: 2024-12-11

## 2024-12-11 RX ORDER — LEVOCETIRIZINE DIHYDROCHLORIDE 5 MG/1
5 TABLET, FILM COATED ORAL DAILY
Qty: 90 TABLET | Refills: 0 | Status: SHIPPED | OUTPATIENT
Start: 2024-12-11

## 2024-12-11 RX ORDER — ROSUVASTATIN CALCIUM 10 MG/1
10 TABLET, COATED ORAL NIGHTLY
Qty: 90 TABLET | Refills: 0 | Status: SHIPPED | OUTPATIENT
Start: 2024-12-11

## 2024-12-11 RX ORDER — MONTELUKAST SODIUM 10 MG/1
10 TABLET ORAL DAILY
Qty: 90 TABLET | Refills: 0 | Status: SHIPPED | OUTPATIENT
Start: 2024-12-11

## 2024-12-11 RX ORDER — LOSARTAN POTASSIUM 25 MG/1
25 TABLET ORAL DAILY
Qty: 90 TABLET | Refills: 0 | Status: SHIPPED | OUTPATIENT
Start: 2024-12-11

## 2024-12-11 RX ORDER — TRIAMTERENE AND HYDROCHLOROTHIAZIDE 37.5; 25 MG/1; MG/1
1 TABLET ORAL DAILY
Qty: 90 TABLET | Refills: 0 | Status: SHIPPED | OUTPATIENT
Start: 2024-12-11

## 2024-12-19 DIAGNOSIS — E78.00 PURE HYPERCHOLESTEROLEMIA, UNSPECIFIED: ICD-10-CM

## 2024-12-19 DIAGNOSIS — J45.50 SEVERE PERSISTENT ASTHMA, UNCOMPLICATED: ICD-10-CM

## 2024-12-19 RX ORDER — PAROXETINE 20 MG/1
20 TABLET, FILM COATED ORAL DAILY
Qty: 90 TABLET | Refills: 0 | Status: SHIPPED | OUTPATIENT
Start: 2024-12-19

## 2024-12-19 RX ORDER — LEVOCETIRIZINE DIHYDROCHLORIDE 5 MG/1
5 TABLET, FILM COATED ORAL DAILY
Qty: 90 TABLET | Refills: 0 | Status: SHIPPED | OUTPATIENT
Start: 2024-12-19

## 2024-12-19 RX ORDER — TRIAMTERENE AND HYDROCHLOROTHIAZIDE 37.5; 25 MG/1; MG/1
1 TABLET ORAL DAILY
Qty: 90 TABLET | Refills: 0 | Status: SHIPPED | OUTPATIENT
Start: 2024-12-19

## 2024-12-19 RX ORDER — ROSUVASTATIN CALCIUM 10 MG/1
10 TABLET, COATED ORAL NIGHTLY
Qty: 90 TABLET | Refills: 0 | Status: SHIPPED | OUTPATIENT
Start: 2024-12-19

## 2024-12-19 RX ORDER — MONTELUKAST SODIUM 10 MG/1
10 TABLET ORAL DAILY
Qty: 90 TABLET | Refills: 3 | Status: SHIPPED | OUTPATIENT
Start: 2024-12-19

## 2025-01-03 ENCOUNTER — OFFICE VISIT (OUTPATIENT)
Facility: CLINIC | Age: 75
End: 2025-01-03

## 2025-01-03 VITALS
OXYGEN SATURATION: 97 % | WEIGHT: 211 LBS | RESPIRATION RATE: 20 BRPM | HEIGHT: 64 IN | HEART RATE: 66 BPM | TEMPERATURE: 98.1 F | DIASTOLIC BLOOD PRESSURE: 75 MMHG | SYSTOLIC BLOOD PRESSURE: 118 MMHG | BODY MASS INDEX: 36.02 KG/M2

## 2025-01-03 DIAGNOSIS — R10.30 LOWER ABDOMINAL PAIN: ICD-10-CM

## 2025-01-03 DIAGNOSIS — R39.11 HESITANCY OF MICTURITION: ICD-10-CM

## 2025-01-03 DIAGNOSIS — N30.00 ACUTE CYSTITIS WITHOUT HEMATURIA: Primary | ICD-10-CM

## 2025-01-03 LAB
BILIRUBIN, URINE, POC: NEGATIVE
BLOOD URINE, POC: NEGATIVE
GLUCOSE URINE, POC: NEGATIVE
KETONES, URINE, POC: NEGATIVE
LEUKOCYTE ESTERASE, URINE, POC: NORMAL
NITRITE, URINE, POC: NEGATIVE
PH, URINE, POC: 6.5 (ref 4.6–8)
PROTEIN,URINE, POC: NEGATIVE
SPECIFIC GRAVITY, URINE, POC: 1.01 (ref 1–1.03)
URINALYSIS CLARITY, POC: CLEAR
URINALYSIS COLOR, POC: YELLOW
UROBILINOGEN, POC: NORMAL

## 2025-01-03 RX ORDER — SULFAMETHOXAZOLE AND TRIMETHOPRIM 800; 160 MG/1; MG/1
1 TABLET ORAL 2 TIMES DAILY
Qty: 10 TABLET | Refills: 0 | Status: SHIPPED | OUTPATIENT
Start: 2025-01-03 | End: 2025-01-08

## 2025-01-03 ASSESSMENT — PATIENT HEALTH QUESTIONNAIRE - PHQ9
SUM OF ALL RESPONSES TO PHQ QUESTIONS 1-9: 0
SUM OF ALL RESPONSES TO PHQ QUESTIONS 1-9: 0
2. FEELING DOWN, DEPRESSED OR HOPELESS: NOT AT ALL
SUM OF ALL RESPONSES TO PHQ QUESTIONS 1-9: 0
SUM OF ALL RESPONSES TO PHQ QUESTIONS 1-9: 0
SUM OF ALL RESPONSES TO PHQ9 QUESTIONS 1 & 2: 0
1. LITTLE INTEREST OR PLEASURE IN DOING THINGS: NOT AT ALL

## 2025-01-03 NOTE — PROGRESS NOTES
Leonor Soriano is a 74 y.o. female , id x 2(name and ). Reviewed record, history, and  medications.      Chief Complaint   Patient presents with    Urinary Pain     Patient c/o urinary pressure, since yesterday.          Vitals:    25 1442   Weight: 95.7 kg (211 lb)   Height: 1.626 m (5' 4\")             1/3/2025     2:49 PM   PHQ-9    Little interest or pleasure in doing things 0   Feeling down, depressed, or hopeless 0   PHQ-2 Score 0   PHQ-9 Total Score 0            No data to display                Social Determinants of Health     Tobacco Use: Medium Risk (10/18/2024)    Patient History     Smoking Tobacco Use: Former     Smokeless Tobacco Use: Never     Passive Exposure: Not on file   Alcohol Use: Not At Risk (10/1/2024)    AUDIT-C     Frequency of Alcohol Consumption: Monthly or less     Average Number of Drinks: 1 or 2     Frequency of Binge Drinking: Less than monthly   Financial Resource Strain: Low Risk  (10/1/2024)    Overall Financial Resource Strain (CARDIA)     Difficulty of Paying Living Expenses: Not hard at all   Food Insecurity: No Food Insecurity (10/1/2024)    Hunger Vital Sign     Worried About Running Out of Food in the Last Year: Never true     Ran Out of Food in the Last Year: Never true   Transportation Needs: Unknown (10/1/2024)    PRAPARE - Transportation     Lack of Transportation (Medical): Not on file     Lack of Transportation (Non-Medical): No   Physical Activity: Insufficiently Active (10/1/2024)    Exercise Vital Sign     Days of Exercise per Week: 1 day     Minutes of Exercise per Session: 20 min   Stress: Not on file   Social Connections: Not on file   Intimate Partner Violence: Not on file   Depression: Not at risk (10/1/2024)    PHQ-2     PHQ-2 Score: 0   Housing Stability: Unknown (10/1/2024)    Housing Stability Vital Sign     Unable to Pay for Housing in the Last Year: Not on file     Number of Times Moved in the Last Year: Not on file     Homeless in the Last 
interpretive errors are inadvertently transcribed by the computer software.  Please disregard these errors.  Please excuse any errors that have escaped final proofreading.  Thank you.     Chel Mar PA-C    There are no Patient Instructions on file for this visit.

## 2025-01-04 LAB
BACTERIA SPEC CULT: ABNORMAL
CC UR VC: ABNORMAL
SERVICE CMNT-IMP: ABNORMAL

## 2025-01-06 ENCOUNTER — TELEPHONE (OUTPATIENT)
Facility: CLINIC | Age: 75
End: 2025-01-06

## 2025-01-06 DIAGNOSIS — N30.00 ACUTE CYSTITIS WITHOUT HEMATURIA: Primary | ICD-10-CM

## 2025-01-06 RX ORDER — AMOXICILLIN 500 MG/1
500 CAPSULE ORAL 2 TIMES DAILY
Qty: 14 CAPSULE | Refills: 0 | Status: SHIPPED | OUTPATIENT
Start: 2025-01-06 | End: 2025-01-13

## 2025-02-04 DIAGNOSIS — I10 ESSENTIAL (PRIMARY) HYPERTENSION: ICD-10-CM

## 2025-02-04 RX ORDER — POTASSIUM CHLORIDE 1500 MG/1
TABLET, EXTENDED RELEASE ORAL
Qty: 90 TABLET | Refills: 0 | Status: SHIPPED | OUTPATIENT
Start: 2025-02-04

## 2025-03-07 DIAGNOSIS — I10 ESSENTIAL (PRIMARY) HYPERTENSION: ICD-10-CM

## 2025-03-10 RX ORDER — LOSARTAN POTASSIUM 25 MG/1
25 TABLET ORAL DAILY
Qty: 90 TABLET | Refills: 0 | Status: SHIPPED | OUTPATIENT
Start: 2025-03-10

## 2025-03-27 ENCOUNTER — TRANSCRIBE ORDERS (OUTPATIENT)
Facility: HOSPITAL | Age: 75
End: 2025-03-27

## 2025-03-27 DIAGNOSIS — Z12.31 ENCOUNTER FOR SCREENING MAMMOGRAM FOR BREAST CANCER: Primary | ICD-10-CM

## 2025-04-07 ENCOUNTER — RESULTS FOLLOW-UP (OUTPATIENT)
Facility: CLINIC | Age: 75
End: 2025-04-07

## 2025-04-07 ENCOUNTER — HOSPITAL ENCOUNTER (OUTPATIENT)
Facility: HOSPITAL | Age: 75
Discharge: HOME | End: 2025-04-10
Payer: MEDICARE

## 2025-04-07 DIAGNOSIS — Z12.31 ENCOUNTER FOR SCREENING MAMMOGRAM FOR BREAST CANCER: ICD-10-CM

## 2025-04-07 PROCEDURE — 77063 BREAST TOMOSYNTHESIS BI: CPT

## 2025-04-17 NOTE — PROGRESS NOTES
Cancer [place] at [facility]  [address], [address] [place] [address]  W: [phone]  F: [phone]      Reason for Visit:   Leonor Soriano is a 75 y.o. female who is seen today for evaluation of iron deficiency anemia.    History of Present Illness:   She received venofer 11/2024. She reports feeling \"great\" after this for awhile. However, fatigue has started to return more recently.        Review of systems was obtained and pertinent findings reviewed above. Past medical history, social history, family history, medications, and allergies are located in the electronic medical record.    Physical Exam:   There were no vitals taken for this visit.  General: alert, cooperative, no distress   Mental  status: normal mood, behavior, speech, dress, motor activity, and thought processes, able to follow commands   HENT: NCAT   Neck: no visualized mass   Resp: no respiratory distress   Neuro: no gross deficits   Skin: no discoloration or lesions of concern on visible areas   Psychiatric: normal affect, consistent with stated mood, no evidence of hallucinations     Due to this being a TeleHealth evaluation, many elements of the physical examination are unable to be assessed.  Evaluation of the following organ systems was limited: Vitals/Constitutional/EENT/Resp/CV/GI//MS/Neuro/Skin/Heme-Lymph-Imm.      Results:     Lab Results   Component Value Date    WBC 9.0 04/18/2025    HGB 16.2 (H) 04/18/2025    HCT 47.0 04/18/2025     04/18/2025    MCV 86.4 04/18/2025    NEUTROABS 6.76 04/18/2025     Lab Results   Component Value Date     10/01/2024    K 3.4 (L) 10/01/2024     10/01/2024    CO2 31 10/01/2024    GLUCOSE 108 (H) 10/01/2024    BUN 11 10/01/2024    CREATININE 1.11 (H) 10/01/2024    LABGLOM 53 (L) 10/01/2024    CALCIUM 9.2 10/01/2024    MG 2.1 04/18/2023     Lab Results   Component Value Date    BILITOT 0.5 10/01/2024    ALT 18 10/01/2024    AST 23 10/01/2024

## 2025-04-18 DIAGNOSIS — D50.0 IRON DEFICIENCY ANEMIA SECONDARY TO BLOOD LOSS (CHRONIC): ICD-10-CM

## 2025-04-18 LAB
BASOPHILS # BLD: 0.02 K/UL (ref 0–0.1)
BASOPHILS NFR BLD: 0.2 % (ref 0–1)
DIFFERENTIAL METHOD BLD: ABNORMAL
EOSINOPHIL # BLD: 0 K/UL (ref 0–0.4)
EOSINOPHIL NFR BLD: 0 % (ref 0–7)
ERYTHROCYTE [DISTWIDTH] IN BLOOD BY AUTOMATED COUNT: 14.1 % (ref 11.5–14.5)
FERRITIN SERPL-MCNC: 23 NG/ML (ref 8–252)
HCT VFR BLD AUTO: 47 % (ref 35–47)
HGB BLD-MCNC: 16.2 G/DL (ref 11.5–16)
IMM GRANULOCYTES # BLD AUTO: 0.05 K/UL (ref 0–0.04)
IMM GRANULOCYTES NFR BLD AUTO: 0.6 % (ref 0–0.5)
IRON SATN MFR SERPL: 29 % (ref 20–50)
IRON SERPL-MCNC: 108 UG/DL (ref 35–150)
LYMPHOCYTES # BLD: 1.52 K/UL (ref 0.8–3.5)
LYMPHOCYTES NFR BLD: 16.9 % (ref 12–49)
MCH RBC QN AUTO: 29.8 PG (ref 26–34)
MCHC RBC AUTO-ENTMCNC: 34.5 G/DL (ref 30–36.5)
MCV RBC AUTO: 86.4 FL (ref 80–99)
MONOCYTES # BLD: 0.62 K/UL (ref 0–1)
MONOCYTES NFR BLD: 6.9 % (ref 5–13)
NEUTS SEG # BLD: 6.76 K/UL (ref 1.8–8)
NEUTS SEG NFR BLD: 75.4 % (ref 32–75)
NRBC # BLD: 0 K/UL (ref 0–0.01)
NRBC BLD-RTO: 0 PER 100 WBC
PLATELET # BLD AUTO: 176 K/UL (ref 150–400)
PMV BLD AUTO: 10.2 FL (ref 8.9–12.9)
RBC # BLD AUTO: 5.44 M/UL (ref 3.8–5.2)
TIBC SERPL-MCNC: 377 UG/DL (ref 250–450)
WBC # BLD AUTO: 9 K/UL (ref 3.6–11)

## 2025-04-25 ENCOUNTER — TELEMEDICINE (OUTPATIENT)
Age: 75
End: 2025-04-25
Payer: MEDICARE

## 2025-04-25 DIAGNOSIS — D50.0 IRON DEFICIENCY ANEMIA SECONDARY TO BLOOD LOSS (CHRONIC): Primary | ICD-10-CM

## 2025-04-25 PROCEDURE — 1123F ACP DISCUSS/DSCN MKR DOCD: CPT | Performed by: INTERNAL MEDICINE

## 2025-04-25 PROCEDURE — 1160F RVW MEDS BY RX/DR IN RCRD: CPT | Performed by: INTERNAL MEDICINE

## 2025-04-25 PROCEDURE — 1036F TOBACCO NON-USER: CPT | Performed by: INTERNAL MEDICINE

## 2025-04-25 PROCEDURE — 3017F COLORECTAL CA SCREEN DOC REV: CPT | Performed by: INTERNAL MEDICINE

## 2025-04-25 PROCEDURE — G8399 PT W/DXA RESULTS DOCUMENT: HCPCS | Performed by: INTERNAL MEDICINE

## 2025-04-25 PROCEDURE — 1159F MED LIST DOCD IN RCRD: CPT | Performed by: INTERNAL MEDICINE

## 2025-04-25 PROCEDURE — 99214 OFFICE O/P EST MOD 30 MIN: CPT | Performed by: INTERNAL MEDICINE

## 2025-04-25 PROCEDURE — G8427 DOCREV CUR MEDS BY ELIG CLIN: HCPCS | Performed by: INTERNAL MEDICINE

## 2025-04-25 PROCEDURE — G8417 CALC BMI ABV UP PARAM F/U: HCPCS | Performed by: INTERNAL MEDICINE

## 2025-04-25 PROCEDURE — 1090F PRES/ABSN URINE INCON ASSESS: CPT | Performed by: INTERNAL MEDICINE

## 2025-04-25 NOTE — PROGRESS NOTES
Chief Complaint   Patient presents with    Follow-up       There were no vitals taken for this visit.  1. Have you been to the ER, urgent care clinic since your last visit?  Hospitalized since your last visit?No    2. Have you seen or consulted any other health care providers outside of the [facility] since your last visit?  Include any pap smears or colon screening. No

## 2025-04-25 NOTE — PATIENT INSTRUCTIONS
Thank you for participating in the virtual visit with Dr. Pedraza.    We will call you soon to schedule a follow up appointment with us in 1 year.      If you do not hear from us, please call us at [phone] to schedule your appointment.    Please use the enclosed lab slip to have your labs done before your next appointment.

## 2025-05-01 ENCOUNTER — TELEPHONE (OUTPATIENT)
Age: 75
End: 2025-05-01

## 2025-05-23 NOTE — TELEPHONE ENCOUNTER
Enrolled with [employer] - Ordered and being shipped to patient's home address on file. ETA within 7-14 Business Days.        1 week holter  Received: 3 days ago  Bernadette Cordero, MD Chelo Delacruz  Can you please mail her a 1 week for palpitations     Thanks   SK
normal...

## 2025-06-02 DIAGNOSIS — E78.00 PURE HYPERCHOLESTEROLEMIA, UNSPECIFIED: ICD-10-CM

## 2025-06-02 DIAGNOSIS — I10 ESSENTIAL (PRIMARY) HYPERTENSION: ICD-10-CM

## 2025-06-02 RX ORDER — ROSUVASTATIN CALCIUM 10 MG/1
10 TABLET, COATED ORAL NIGHTLY
Qty: 90 TABLET | Refills: 0 | Status: SHIPPED | OUTPATIENT
Start: 2025-06-02

## 2025-06-02 RX ORDER — LOSARTAN POTASSIUM 25 MG/1
25 TABLET ORAL DAILY
Qty: 90 TABLET | Refills: 0 | Status: SHIPPED | OUTPATIENT
Start: 2025-06-02

## 2025-06-03 DIAGNOSIS — J45.50 SEVERE PERSISTENT ASTHMA, UNCOMPLICATED (HCC): ICD-10-CM

## 2025-06-04 RX ORDER — TRIAMTERENE AND HYDROCHLOROTHIAZIDE 37.5; 25 MG/1; MG/1
1 TABLET ORAL DAILY
Qty: 90 TABLET | Refills: 0 | Status: SHIPPED | OUTPATIENT
Start: 2025-06-04

## 2025-06-04 RX ORDER — PAROXETINE 20 MG/1
20 TABLET, FILM COATED ORAL DAILY
Qty: 90 TABLET | Refills: 0 | Status: SHIPPED | OUTPATIENT
Start: 2025-06-04

## 2025-06-04 RX ORDER — LEVOCETIRIZINE DIHYDROCHLORIDE 5 MG/1
5 TABLET, FILM COATED ORAL DAILY
Qty: 90 TABLET | Refills: 0 | Status: SHIPPED | OUTPATIENT
Start: 2025-06-04

## 2025-06-04 RX ORDER — PANTOPRAZOLE SODIUM 40 MG/1
40 TABLET, DELAYED RELEASE ORAL DAILY
Qty: 90 TABLET | Refills: 0 | Status: SHIPPED | OUTPATIENT
Start: 2025-06-04

## 2025-06-14 DIAGNOSIS — I10 ESSENTIAL (PRIMARY) HYPERTENSION: ICD-10-CM

## 2025-06-16 RX ORDER — POTASSIUM CHLORIDE 1500 MG/1
20 TABLET, EXTENDED RELEASE ORAL DAILY
Qty: 90 TABLET | Refills: 0 | Status: SHIPPED | OUTPATIENT
Start: 2025-06-16

## (undated) DEVICE — REM POLYHESIVE ADULT PATIENT RETURN ELECTRODE: Brand: VALLEYLAB

## (undated) DEVICE — PROBE ARC STRAIGHT FIRE 23 MM OD X 220 CM 10/BX

## (undated) DEVICE — FIAPC® PROBE W/ FILTER 2200 C OD 2.3MM/6.9FR; L 2.2M/7.2FT: Brand: ERBE

## (undated) DEVICE — SNARE ENDOSCP M L240CM W27MM SHTH DIA2.4MM CHN 2.8MM OVL

## (undated) DEVICE — 3M™ CUROS™ DISINFECTING CAP FOR NEEDLELESS CONNECTORS 270/CARTON 20 CARTONS/CASE CFF1-270: Brand: CUROS™

## (undated) DEVICE — FORCEPS BX L240CM JAW DIA2.8MM L CAP W/ NDL MIC MESH TOOTH

## (undated) DEVICE — Device

## (undated) DEVICE — 1200 GUARD II KIT W/5MM TUBE W/O VAC TUBE: Brand: GUARDIAN

## (undated) DEVICE — BITEBLOCK ENDOSCP 60FR MAXI WHT POLYETH STURDY W/ VELC WVN

## (undated) DEVICE — SET ADMIN 16ML TBNG L100IN 2 Y INJ SITE IV PIGGY BK DISP

## (undated) DEVICE — BAG SPEC BIOHZRD 10 X 10 IN --

## (undated) DEVICE — CATH IV AUTOGRD BC BLU 22GA 25 -- INSYTE

## (undated) DEVICE — ESOPHAGEAL BALLOON DILATATION CATHETER: Brand: CRE FIXED WIRE

## (undated) DEVICE — SOLIDIFIER MEDC 1200ML -- CONVERT TO 356117

## (undated) DEVICE — ELECTRODE,RADIOTRANSLUCENT,FOAM,3PK: Brand: MEDLINE

## (undated) DEVICE — CONTAINER SPEC 20 ML LID NEUT BUFF FORMALIN 10 % POLYPR STS

## (undated) DEVICE — SET GRAV CK VLV NEEDLESS ST 3 GANGED 4WAY STPCOCK HI FLO 10

## (undated) DEVICE — KENDALL RADIOLUCENT FOAM MONITORING ELECTRODE -RECTANGULAR SHAPE: Brand: KENDALL

## (undated) DEVICE — KIT COLON W/ 1.1OZ LUB AND 2 END

## (undated) DEVICE — POLYP TRAP: Brand: TRAPEASE®

## (undated) DEVICE — CUFF BLD PRSS AD SZ 11 FOR 25-34CM 1 TB TRIPURPOSE CONN

## (undated) DEVICE — ADULT SPO2 SENSOR: Brand: NELLCOR

## (undated) DEVICE — SYR ASSEMB INFL BLLN 60ML --

## (undated) DEVICE — SIMPLICITY FLUFF UNDERPAD 23X36, MODERATE: Brand: SIMPLICITY

## (undated) DEVICE — CANN NASAL O2 CAPNOGRAPHY AD -- FILTERLINE

## (undated) DEVICE — CATH IV AUTOGRD BC PNK 20GA 25 -- INSYTE

## (undated) DEVICE — JELLY,LUBE,STERILE,FLIP TOP,TUBE,4-OZ: Brand: MEDLINE

## (undated) DEVICE — FIAPC® PROBE W/ FILTER 2200 A OD 2.3MM/6.9FR; L 2.2M/7.2FT: Brand: ERBE

## (undated) DEVICE — BAG BELONG PT PERS CLEAR HANDL